# Patient Record
Sex: FEMALE | Race: WHITE | NOT HISPANIC OR LATINO | Employment: FULL TIME | URBAN - METROPOLITAN AREA
[De-identification: names, ages, dates, MRNs, and addresses within clinical notes are randomized per-mention and may not be internally consistent; named-entity substitution may affect disease eponyms.]

---

## 2017-08-08 ENCOUNTER — OFFICE VISIT - HEALTHEAST (OUTPATIENT)
Dept: FAMILY MEDICINE | Facility: CLINIC | Age: 27
End: 2017-08-08

## 2017-08-08 DIAGNOSIS — F41.1 GENERALIZED ANXIETY DISORDER: ICD-10-CM

## 2017-08-08 DIAGNOSIS — G47.00 INSOMNIA: ICD-10-CM

## 2017-08-08 DIAGNOSIS — N92.6 IRREGULAR MENSES: ICD-10-CM

## 2017-08-08 DIAGNOSIS — K64.9 HEMORRHOIDS: ICD-10-CM

## 2017-08-08 LAB — HBA1C MFR BLD: 5.2 % (ref 3.5–6)

## 2017-08-08 ASSESSMENT — MIFFLIN-ST. JEOR: SCORE: 1533.28

## 2017-08-10 ENCOUNTER — COMMUNICATION - HEALTHEAST (OUTPATIENT)
Dept: FAMILY MEDICINE | Facility: CLINIC | Age: 27
End: 2017-08-10

## 2017-08-23 ENCOUNTER — COMMUNICATION - HEALTHEAST (OUTPATIENT)
Dept: FAMILY MEDICINE | Facility: CLINIC | Age: 27
End: 2017-08-23

## 2017-09-25 ENCOUNTER — RECORDS - HEALTHEAST (OUTPATIENT)
Dept: ADMINISTRATIVE | Facility: OTHER | Age: 27
End: 2017-09-25

## 2019-01-07 ENCOUNTER — COMMUNICATION - HEALTHEAST (OUTPATIENT)
Dept: SCHEDULING | Facility: CLINIC | Age: 29
End: 2019-01-07

## 2019-01-08 ENCOUNTER — RECORDS - HEALTHEAST (OUTPATIENT)
Dept: ADMINISTRATIVE | Facility: OTHER | Age: 29
End: 2019-01-08

## 2021-05-31 VITALS — HEIGHT: 65 IN | BODY MASS INDEX: 29.66 KG/M2 | WEIGHT: 178 LBS

## 2021-06-12 NOTE — PROGRESS NOTES
Assessment/Plan:     1. Generalized anxiety disorder  We will get labs as below per patient's request.  Recommend Wellbutrin as that may help her with some of her weight loss and could be activating which is a major concern for her.  May titrate up after 2 weeks if not fully effective dose.  Consider therapy continue oils and supportive care.  - buPROPion (WELLBUTRIN XL) 150 MG 24 hr tablet; Take 1 tablet (150 mg total) by mouth every morning.  Dispense: 30 tablet; Refill: 7  - Thyroid Cascade    2. Irregular menses  We will place referral per patient's request will get basic labs as below.  Recommended low-carb diet.  - Ambulatory referral to Obstetrics / Gynecology  - Dehydroepiandrosterone Sulfate(DHEA-S)  - Testosterone, Total and Free  - Estradiol  - Progesterone  - Glycosylated Hemoglobin A1c    3. Hemorrhoids  We will try cream and refer to colorectal as patient is interested in discussing possible surgery.  - hydrocortisone (ANUSOL-HC) 2.5 % rectal cream; Apply rectally 2 times daily  Dispense: 30 g; Refill: 1  - Ambulatory referral to Colorectal Surgery    4. Insomnia  Recommend trying melatonin hopefully insomnia will improve once anxiety is under better control.  Also recommend could take magnesium.      The following are part of a depression follow up plan for the patient:  emotional support assessment, emotional support education and emotional support management  Total time spent was 45 minutes, >50% spent discussing treatment options noted above, counseling and coordination of care.     Subjective:      Marisol Licea is a 26 y.o. female comes in today with several concerns.  Her primary concern is mental health symptoms.  She has actually brought this up to a previous provider reviewed the visit note from February 2015.  At that time she was prescribed Lexapro and hydroxyzine but never ended up taking the medications.  She states that she has always been somewhat anxious and depressed but it  seems as though it is interfering with her life over the past year.  She states that she feels like she just wants to sit on the couch all day long she is not getting up and doing things she feels like she is wasted the summer.  She states that she drinks regularly to help calm the anxiety she uses the example that even if she has to go to [employer] she has to drink before going there.  This has been her habit for the last several years.  She states that she drinks too much she gets a hangover next day her anxiety is even worse.  She like to stop the habit of drinking.  She like to drink for fun only.  There is not a history of anxiety or mental health concerns in her family.  She previously states she was against taking medications but now she thinks that she is ready to take them.  She is also concerned gained weight due to her lack of interest.  Had breast augmentation a year ago and gained weight while recovering for that but staff to work on at home but is not using it.  Also difficulty sleeping just ordered some melatonin but has not tried it yet.  She has concerns about infertility.  States she is not actively trying to get pregnant but regularly has intercourse with her boyfriend and they do not use protection.  She does not want to start birth control pills.  States that she does not really have a fear of pregnancy because of this infertility concern.  She is afraid that she will be able to have a kid has low libido I think it is likely due to her anxiety.  Reviewed last labs.  She gets.  About 4 times a year mom has history of endometriosis.  She states she does not have very painful periods but does get some pain every once in a while in her ovary area and think she has a history of ovarian cysts.  She has had ultrasounds in the past but has not been evaluated by gynecologist.  Also at the end of the appointment she tells me she has some concerns with hemorrhoids.  She states she does not have any  "constipation.  She states that previously they told her that she would need surgery because they are dime sized.  She has tried over-the-counter therapies.    Current Outpatient Prescriptions   Medication Sig Dispense Refill     buPROPion (WELLBUTRIN XL) 150 MG 24 hr tablet Take 1 tablet (150 mg total) by mouth every morning. 30 tablet 7     hydrocortisone (ANUSOL-HC) 2.5 % rectal cream Apply rectally 2 times daily 30 g 1     No current facility-administered medications for this visit.      No Known Allergies  Past Medical History, Family History, and Social History reviewed.  No past medical history on file.  No past surgical history on file.  Review of patient's allergies indicates no known allergies.  No family history on file.  Social History     Social History     Marital status: Single     Spouse name: N/A     Number of children: N/A     Years of education: N/A     Occupational History     Not on file.     Social History Main Topics     Smoking status: Current Every Day Smoker     Packs/day: 0.25     Last attempt to quit: 5/31/2016     Smokeless tobacco: Never Used     Alcohol use Not on file     Drug use: Not on file     Sexual activity: Not on file     Other Topics Concern     Not on file     Social History Narrative         Review of systems is as stated in HPI, and the remainder of the 10 system review is otherwise negative.    Objective:     Vitals:    08/08/17 0951   BP: 110/70   Pulse: 78   SpO2: 98%   Weight: 178 lb (80.7 kg)   Height: 5' 5\" (1.651 m)    Body mass index is 29.62 kg/(m^2).  Wt Readings from Last 3 Encounters:   08/08/17 178 lb (80.7 kg)   06/01/16 156 lb (70.8 kg)   03/24/16 157 lb 9.6 oz (71.5 kg)       General Appearance:    Alert, cooperative, no distress, appears stated age    Head:    Normocephalic, without obvious abnormality, atraumatic   Eyes:    PERRL, no conjunctivitis    Ears:    Normal  external ear canals   Nose:   Mucosa normal, no drainage       Throat:   Oropharynx is " clear   Neck:   Supple, symmetrical, no adenopathy, no thyromegally, no carotid bruit        Lungs:     Clear to auscultation bilaterally, respirations unlabored   Chest Wall:    No tenderness or deformity    Heart:    Regular rate and rhythm, S1 and S2 normal, no murmur, rub    or gallop       Abdomen:     Soft, non-tender, patient declines rectal exam today           Extremities:   Extremities normal, atraumatic, no cyanosis or edema   Pulses:   2+ and symmetric all extremities   Neuro:   cranial nerves grossly intact   Psych:   Scribes sometimes of anxiety and depression but otherwise grossly normal mood and affect without acute anxiety or psychosis    Skin:   No rashes or lesions   :          This note has been dictated using voice recognition software. Any grammatical or context distortions are unintentional and inherent to the software.

## 2021-06-22 NOTE — TELEPHONE ENCOUNTER
"Pt calls to report several issues:  -  history of \"pre-cancerous moles\"  - \"L side of my face has been going numb\" -> \"Has been happening a lot.\"  - \"I've been stooling blood -> About twice a day for the past week.\"  - \"Stomach pains\".    \"I drink a lot of ETOH.\"  \"I drink a 750 Liter bottle of vodka every two days.\"  Has been doing this for three months.    Advised seeking ED eval at [facility], for purpose of stabilizing GI bleed, ruling out stroke, and starting a pathway for outpatient ETOH detox.  Pt verbalizes understanding, however is severely weepy, sobbing.  Asks Triage RN to also explain eval recommendations to her mother .... Done now.  Both pt and mother agree to plan.    Annmarie Hendrickson RN BSBA  [facility] RN Triage     Reason for Disposition    MODERATE rectal bleeding (small blood clots, passing blood without stool, or toilet water turns red) more than once a day    Neurologic deficit that was brief (now gone), ANY of the following: * Weakness of the face, arm, or leg on one side of the body * Numbness of the face, arm, or leg on one side of the body * Loss of speech or garbled speech    Protocols used: RECTAL BLEEDING-A-OH, NEUROLOGIC DEFICIT-A-OH      "

## 2022-01-19 ENCOUNTER — OFFICE VISIT (OUTPATIENT)
Dept: FAMILY MEDICINE | Facility: CLINIC | Age: 32
End: 2022-01-19
Payer: COMMERCIAL

## 2022-01-19 VITALS
WEIGHT: 157.4 LBS | OXYGEN SATURATION: 98 % | HEART RATE: 69 BPM | SYSTOLIC BLOOD PRESSURE: 102 MMHG | DIASTOLIC BLOOD PRESSURE: 70 MMHG | BODY MASS INDEX: 27.02 KG/M2

## 2022-01-19 DIAGNOSIS — K64.4 EXTERNAL HEMORRHOIDS: Primary | ICD-10-CM

## 2022-01-19 PROCEDURE — 99203 OFFICE O/P NEW LOW 30 MIN: CPT | Performed by: STUDENT IN AN ORGANIZED HEALTH CARE EDUCATION/TRAINING PROGRAM

## 2022-01-19 NOTE — PROGRESS NOTES
Assessment and Plan     31-year-old female who presents with concerns of hemorrhoids over the last 5 years or so.  Intermittently painful and bothersome.  She finds the associated skin tags unsightly.  She is wondering about options in treating this.  I recommended she start MiraLAX daily and considering decreasing her weightlifting routine.  I recommended referral to a colorectal surgeon to consider skin tag removal and whether she needs further procedural treatments.    1. External hemorrhoids  - Colorectal Surgery Referral; Future    Follow up: PRN  Options for treatment and follow-up care were reviewed with the patient and/or guardian. Marisol Licea and/or guardian engaged in the decision making process and verbalized understanding of the options discussed and agreed with the final plan.    Dr. Adrian Brooke         HPI:   Marisol Licea is a 31 year old  female who presents for:    Chief Complaint   Patient presents with     Rectal Problem     symptoms for about 5 years. consistent      Patient has hemorrhoids for a couple years. No history of constipation or hx of childbirth. she does however lift frequently approximately 6 days a week.  She has noticed more pain lately.  She states she very rarely has spotting on the tissue paper and in the toilet.  She can feel hemorrhoids almost all the time there and even when they are not particularly painful she feels like the skin is loose near her anus.  She is quite embarrassed by them and is wondering about treatment options.  She has been seen by the physician before for these but never really given advice on treatment options such as procedural and such.         PMHX:     Patient Active Problem List   Diagnosis     ACL tear       No current outpatient medications on file.       Social History     Tobacco Use     Smoking status: Current Every Day Smoker     Packs/day: 0.25     Last attempt to quit: 2016     Years since quittin.6      Smokeless tobacco: Never Used   Substance Use Topics     Alcohol use: Not on file     Drug use: Not on file       Social History     Social History Narrative     Not on file       No Known Allergies    No results found for this or any previous visit (from the past 24 hour(s)).         Review of Systems:    ROS: 10 point ROS neg other than the symptoms noted above in the HPI.         Physical Exam:     Vitals:    01/19/22 1437   BP: 102/70   BP Location: Right arm   Patient Position: Sitting   Cuff Size: Adult Regular   Pulse: 69   SpO2: 98%   Weight: 71.4 kg (157 lb 6.4 oz)     Body mass index is 27.02 kg/m .    General appearance: Alert, cooperative, no distress, appears stated age  Head: Normocephalic, atraumatic, without obvious abnormality  Eyes: Pupils equal round, reactive.  Conjunctiva clear.  Nose: Nares normal, no drainage.  Throat: Lips, mucosa, tongue normal mucosa pink and moist  Neck: Supple, symmetric, trachea midline  Rectal: Skin tags noted but no enlarged hemorrhoids seen externally, anoscopy reveals some internal hemorrhoids but no fissure or fistula.  No rectal mass.

## 2022-01-25 ENCOUNTER — TELEPHONE (OUTPATIENT)
Dept: FAMILY MEDICINE | Facility: CLINIC | Age: 32
End: 2022-01-25
Payer: COMMERCIAL

## 2022-01-25 NOTE — TELEPHONE ENCOUNTER
Reason for Call:  Other call back    Detailed comments: Recvd call from pt/Marisol, she is wondering how often she should be taking the MiraLAX that Dr Perry has recommended. The bottle states do not exceed 7 days in a row. Marisol is wondering if she should take it everyday but to not exceed 7 days or every other day.     Phone Number Patient can be reached at: Home number on file [phone] (home)    Best Time: anytime    Can we leave a detailed message on this number? YES    Call taken on 1/25/2022 at 11:56 AM by Shyla Fleming

## 2022-01-26 ENCOUNTER — TRANSFERRED RECORDS (OUTPATIENT)
Dept: HEALTH INFORMATION MANAGEMENT | Facility: CLINIC | Age: 32
End: 2022-01-26
Payer: COMMERCIAL

## 2022-02-01 ENCOUNTER — TRANSFERRED RECORDS (OUTPATIENT)
Dept: HEALTH INFORMATION MANAGEMENT | Facility: CLINIC | Age: 32
End: 2022-02-01
Payer: COMMERCIAL

## 2022-02-06 ENCOUNTER — HEALTH MAINTENANCE LETTER (OUTPATIENT)
Age: 32
End: 2022-02-06

## 2022-02-23 ENCOUNTER — OFFICE VISIT (OUTPATIENT)
Dept: FAMILY MEDICINE | Facility: CLINIC | Age: 32
End: 2022-02-23
Payer: COMMERCIAL

## 2022-02-23 VITALS
BODY MASS INDEX: 25.61 KG/M2 | OXYGEN SATURATION: 100 % | SYSTOLIC BLOOD PRESSURE: 110 MMHG | TEMPERATURE: 97.8 F | HEART RATE: 94 BPM | WEIGHT: 153.7 LBS | DIASTOLIC BLOOD PRESSURE: 76 MMHG | HEIGHT: 65 IN

## 2022-02-23 DIAGNOSIS — K64.4 EXTERNAL HEMORRHOIDS: ICD-10-CM

## 2022-02-23 DIAGNOSIS — K64.8 INTERNAL HEMORRHOID: ICD-10-CM

## 2022-02-23 DIAGNOSIS — Z01.818 PREOP GENERAL PHYSICAL EXAM: Primary | ICD-10-CM

## 2022-02-23 LAB
HCG UR QL: NEGATIVE
HGB BLD-MCNC: 14.1 G/DL (ref 11.7–15.7)

## 2022-02-23 PROCEDURE — 81025 URINE PREGNANCY TEST: CPT | Performed by: FAMILY MEDICINE

## 2022-02-23 PROCEDURE — 85018 HEMOGLOBIN: CPT | Performed by: FAMILY MEDICINE

## 2022-02-23 PROCEDURE — 36415 COLL VENOUS BLD VENIPUNCTURE: CPT | Performed by: FAMILY MEDICINE

## 2022-02-23 PROCEDURE — 99214 OFFICE O/P EST MOD 30 MIN: CPT | Performed by: FAMILY MEDICINE

## 2022-02-23 NOTE — PROGRESS NOTES
[facility]  [address] [address]  Phone: [phone]  Fax: [phone]  Primary Provider: Sabiha Ramos  Pre-op Performing Provider: CALLUM SIDDIQUI      PREOPERATIVE EVALUATION:  Today's date: 2/23/2022    Marisol Licea is a 31 year old female who presents for a preoperative evaluation.    Surgical Information:  Surgery/Procedure: Hemorrhoidectomy   Surgery Location: [facility]  Surgeon: Dr. Reed  Surgery Date: 2/25/2022  Time of Surgery: 3:45pm  Where patient plans to recover: At home with family  Fax number for surgical facility: [phone]    Type of Anesthesia Anticipated: to be determined    Assessment & Plan     The proposed surgical procedure is considered INTERMEDIATE risk.    Preop general physical exam, External & internal hemorrhoids  - Hemoglobin  - HCG qualitative urine    Risks and Recommendations:  The patient has the following additional risks and recommendations for perioperative complications:   - No identified additional risk factors other than previously addressed    Medication Instructions:  Patient is on no chronic medications    RECOMMENDATION:  APPROVAL GIVEN to proceed with proposed procedure, without further diagnostic evaluation.    Subjective     HPI related to upcoming procedure:     Preop Questions 2/23/2022   1. Have you ever had a heart attack or stroke? No   2. Have you ever had surgery on your heart or blood vessels, such as a stent placement, a coronary artery bypass, or surgery on an artery in your head, neck, heart, or legs? No   3. Do you have chest pain with activity? No   4. Do you have a history of  heart failure? No   5. Do you currently have a cold, bronchitis or symptoms of other infection? No   6. Do you have a cough, shortness of breath, or wheezing? No   7. Do you or anyone in your family have previous history of blood clots? No   8. Do you or does anyone in your family have a serious  bleeding problem such as prolonged bleeding following surgeries or cuts? No   9. Have you ever had problems with anemia or been told to take iron pills? No   10. Have you had any abnormal blood loss such as black, tarry or bloody stools, or abnormal vaginal bleeding? No   11. Have you ever had a blood transfusion? No   12. Are you willing to have a blood transfusion if it is medically needed before, during, or after your surgery? Yes   13. Have you or any of your relatives ever had problems with anesthesia? No   14. Do you have sleep apnea, excessive snoring or daytime drowsiness? No   15. Do you have any artifical heart valves or other implanted medical devices like a pacemaker, defibrillator, or continuous glucose monitor? No   16. Do you have artificial joints? No   17. Are you allergic to latex? No   18. Is there any chance that you may be pregnant? No     Preoperative Review of :   reviewed - no record of controlled substances prescribed.      Status of Chronic Conditions:  See problem list for active medical problems.  Problems all longstanding and stable, except as noted/documented.  See ROS for pertinent symptoms related to these conditions.      Review of Systems  CONSTITUTIONAL: NEGATIVE for fever, chills, change in weight  INTEGUMENTARY/SKIN: NEGATIVE for worrisome rashes, moles or lesions  EYES: NEGATIVE for vision changes or irritation  ENT/MOUTH: NEGATIVE for ear, mouth and throat problems  RESP: NEGATIVE for significant cough or SOB  CV: NEGATIVE for chest pain, palpitations or peripheral edema  GI: NEGATIVE for nausea, abdominal pain, heartburn, or change in bowel habits  : NEGATIVE for frequency, dysuria, or hematuria  MUSCULOSKELETAL: NEGATIVE for significant arthralgias or myalgia  NEURO: NEGATIVE for weakness, dizziness or paresthesias  ENDOCRINE: NEGATIVE for temperature intolerance, skin/hair changes  HEME: NEGATIVE for bleeding problems  PSYCHIATRIC: NEGATIVE for changes in mood or  "affect    Patient Active Problem List    Diagnosis Date Noted     ACL tear 2009     Priority: Medium     Formatting of this note might be different from the original.  ACL Tear, Left        No past medical history on file.  No past surgical history on file.  No current outpatient medications on file.       No Known Allergies     Social History     Tobacco Use     Smoking status: Current Every Day Smoker     Packs/day: 0.25     Last attempt to quit: 2016     Years since quittin.7     Smokeless tobacco: Never Used   Substance Use Topics     Alcohol use: Not on file     No family history on file.  History   Drug Use Not on file         Objective     /76   Pulse 94   Temp 97.8  F (36.6  C) (Oral)   Ht 1.638 m (5' 4.5\")   Wt 69.7 kg (153 lb 11.2 oz)   LMP 2022 (Exact Date)   SpO2 100%   BMI 25.98 kg/m      Physical Exam    GENERAL APPEARANCE: healthy, alert and no distress     EYES: EOMI, PERRL     HENT: ear canals and TM's normal      NECK: no adenopathy, no asymmetry, masses, or scars and thyroid normal to palpation     RESP: lungs clear to auscultation - no rales, rhonchi or wheezes     CV: regular rates and rhythm, normal S1 S2, no S3 or S4 and no murmur, click or rub     ABDOMEN:  soft, nontender, no HSM or masses and bowel sounds normal     MS: extremities normal- no gross deformities noted, no evidence of inflammation in joints, FROM in all extremities.     SKIN: no suspicious lesions or rashes     NEURO: Normal strength and tone, sensory exam grossly normal, mentation intact and speech normal     PSYCH: mentation appears normal. and affect normal/bright     LYMPHATICS: No cervical adenopathy    No results for input(s): HGB, PLT, INR, NA, POTASSIUM, CR, A1C in the last 61087 hours.     Diagnostics:  Recent Results (from the past 24 hour(s))   Hemoglobin    Collection Time: 22 11:17 AM   Result Value Ref Range    Hemoglobin 14.1 11.7 - 15.7 g/dL   HCG qualitative urine    " Collection Time: 02/23/22 11:22 AM   Result Value Ref Range    hCG Urine Qualitative Negative Negative      No EKG required, no history of coronary heart disease, significant arrhythmia, peripheral arterial disease or other structural heart disease.    Revised Cardiac Risk Index (RCRI):  The patient has the following serious cardiovascular risks for perioperative complications:   - No serious cardiac risks = 0 points     RCRI Interpretation: 0 points: Class I (very low risk - 0.4% complication rate)           Signed Electronically by: Markos Do MD  Copy of this evaluation report is provided to requesting physician.

## 2022-02-25 PROCEDURE — 88304 TISSUE EXAM BY PATHOLOGIST: CPT | Mod: TC,ORL | Performed by: COLON & RECTAL SURGERY

## 2022-02-28 ENCOUNTER — LAB REQUISITION (OUTPATIENT)
Dept: LAB | Facility: CLINIC | Age: 32
End: 2022-02-28
Payer: COMMERCIAL

## 2022-03-01 LAB
PATH REPORT.COMMENTS IMP SPEC: NORMAL
PATH REPORT.COMMENTS IMP SPEC: NORMAL
PATH REPORT.FINAL DX SPEC: NORMAL
PATH REPORT.GROSS SPEC: NORMAL
PATH REPORT.MICROSCOPIC SPEC OTHER STN: NORMAL
PATH REPORT.RELEVANT HX SPEC: NORMAL
PHOTO IMAGE: NORMAL

## 2022-03-01 PROCEDURE — 88304 TISSUE EXAM BY PATHOLOGIST: CPT | Mod: 26 | Performed by: PATHOLOGY

## 2022-03-15 ENCOUNTER — TRANSFERRED RECORDS (OUTPATIENT)
Dept: HEALTH INFORMATION MANAGEMENT | Facility: CLINIC | Age: 32
End: 2022-03-15
Payer: COMMERCIAL

## 2022-04-05 ENCOUNTER — TRANSFERRED RECORDS (OUTPATIENT)
Dept: HEALTH INFORMATION MANAGEMENT | Facility: CLINIC | Age: 32
End: 2022-04-05
Payer: COMMERCIAL

## 2022-05-03 ENCOUNTER — TRANSFERRED RECORDS (OUTPATIENT)
Dept: HEALTH INFORMATION MANAGEMENT | Facility: CLINIC | Age: 32
End: 2022-05-03
Payer: COMMERCIAL

## 2022-05-24 ENCOUNTER — OFFICE VISIT (OUTPATIENT)
Dept: FAMILY MEDICINE | Facility: CLINIC | Age: 32
End: 2022-05-24
Payer: COMMERCIAL

## 2022-05-24 VITALS
WEIGHT: 161.1 LBS | BODY MASS INDEX: 27.23 KG/M2 | TEMPERATURE: 98.5 F | OXYGEN SATURATION: 100 % | SYSTOLIC BLOOD PRESSURE: 120 MMHG | DIASTOLIC BLOOD PRESSURE: 80 MMHG | RESPIRATION RATE: 20 BRPM | HEART RATE: 80 BPM

## 2022-05-24 DIAGNOSIS — Z32.01 PREGNANCY TEST POSITIVE: Primary | ICD-10-CM

## 2022-05-24 LAB — HCG UR QL: POSITIVE

## 2022-05-24 PROCEDURE — 99213 OFFICE O/P EST LOW 20 MIN: CPT | Performed by: NURSE PRACTITIONER

## 2022-05-24 PROCEDURE — 81025 URINE PREGNANCY TEST: CPT | Performed by: NURSE PRACTITIONER

## 2022-05-24 ASSESSMENT — PAIN SCALES - GENERAL: PAINLEVEL: MILD PAIN (2)

## 2022-05-24 NOTE — PROGRESS NOTES
"  Assessment & Plan     Pregnancy test positive  Pregnancy confirmation visit completed today. Patient is approx. 5.5 weeks gestation with an estimated due date of January 20th. We discussed pregnancy recommendations and safety concerns. Reviewed medications and foods that are considered safe in pregnancy. Advised against any substance use. She has started taking a prenatal vitamin. Referral for ultrasound placed for 7-9 weeks gestation. She will schedule first prenatal appointment shortly after that.   - HCG qualitative urine POCT, Enter/Edit Result  - HCG qualitative urine  - US OB < 14 Weeks Single      BMI:   Estimated body mass index is 27.23 kg/m  as calculated from the following:    Height as of 2/23/22: 1.638 m (5' 4.5\").    Weight as of this encounter: 73.1 kg (161 lb 1.6 oz).       No follow-ups on file.    MILKA Petersen CNP  [facility] MACRINA Damon is a 31 year old who presents for the following health issues: pregnancy confirmation    HPI   Patient is here today for pregnancy confirmation visit.  She has not seen her primary care doctor in several years.  She is overall very healthy.  Reports history of PCOS and has always had irregular menstrual periods until recently.  She has been having menstrual period more regularly over the last year or so.  She took a positive home pregnancy test this past Friday.  Her last menstrual period was around 4/15 which puts her at 5-1/2 weeks gestation today.  This was unexpected as she has only been in a relationship for 2 months however she feels excited about the pregnancy and supported by her partner.  She denies any chance for sexually transmitted infection.  She has been sober from alcohol for 3 years.  She denies any use of illicit drugs or prescription medications.  She does work out heavily and drinks high-protein drinks along with excessive amounts of caffeine.  She stopped caffeine intake altogether when she found " out she was pregnant.  She has been taking a prenatal vitamin regularly.    She is not currently on any health insurance but this will be started June 1.  She prefers to hold off on any blood work or tests until after that time if possible.      Review of Systems   Review of Systems - pertinent positives noted in HPI, otherwise ROS is negative.        Objective    /80 (BP Location: Right arm, Patient Position: Sitting, Cuff Size: Adult Regular)   Pulse 80   Temp 98.5  F (36.9  C)   Resp 20   Wt 73.1 kg (161 lb 1.6 oz)   LMP 04/15/2022   SpO2 100%   BMI 27.23 kg/m    Body mass index is 27.23 kg/m .  Physical Exam     General Appearance:  Alert, cooperative, no distress, appears stated age   Heart:  Regular rate and rhythm, S1, S2 normal, no murmur, rub or gallop   Extremities: Extremities normal.    Skin: Warm, dry.  Skin color, texture, turgor normal, no rashes or lesions                 Answers for HPI/ROS submitted by the patient on 5/24/2022  What is the reason for your visit today? : Pregnancy  How many servings of fruits and vegetables do you eat daily?: 0-1  On average, how many sweetened beverages do you drink each day (Examples: soda, juice, sweet tea, etc.  Do NOT count diet or artificially sweetened beverages)?: 0  How many minutes a day do you exercise enough to make your heart beat faster?: 60 or more  How many days a week do you exercise enough to make your heart beat faster?: 5  How many days per week do you miss taking your medication?: 0

## 2022-06-14 ENCOUNTER — TRANSFERRED RECORDS (OUTPATIENT)
Dept: HEALTH INFORMATION MANAGEMENT | Facility: CLINIC | Age: 32
End: 2022-06-14
Payer: COMMERCIAL

## 2022-06-16 ENCOUNTER — HOSPITAL ENCOUNTER (OUTPATIENT)
Dept: ULTRASOUND IMAGING | Facility: HOSPITAL | Age: 32
Discharge: HOME | End: 2022-06-16
Attending: NURSE PRACTITIONER | Admitting: NURSE PRACTITIONER
Payer: COMMERCIAL

## 2022-06-16 ENCOUNTER — PRENATAL OFFICE VISIT (OUTPATIENT)
Dept: FAMILY MEDICINE | Facility: CLINIC | Age: 32
End: 2022-06-16
Payer: COMMERCIAL

## 2022-06-16 VITALS — SYSTOLIC BLOOD PRESSURE: 122 MMHG | WEIGHT: 169 LBS | DIASTOLIC BLOOD PRESSURE: 86 MMHG | BODY MASS INDEX: 28.56 KG/M2

## 2022-06-16 DIAGNOSIS — Z32.01 PREGNANCY TEST POSITIVE: ICD-10-CM

## 2022-06-16 DIAGNOSIS — Z34.00 SUPERVISION OF NORMAL FIRST PREGNANCY, ANTEPARTUM: Primary | ICD-10-CM

## 2022-06-16 PROBLEM — E28.2 PCOS (POLYCYSTIC OVARIAN SYNDROME): Status: ACTIVE | Noted: 2022-06-16

## 2022-06-16 PROBLEM — K64.9 HEMORRHOIDS, UNSPECIFIED HEMORRHOID TYPE: Status: ACTIVE | Noted: 2022-06-16

## 2022-06-16 LAB
ABO/RH(D): NORMAL
ALBUMIN UR-MCNC: NEGATIVE MG/DL
ANTIBODY SCREEN: NEGATIVE
APPEARANCE UR: CLEAR
BILIRUB UR QL STRIP: NEGATIVE
COLOR UR AUTO: YELLOW
ERYTHROCYTE [DISTWIDTH] IN BLOOD BY AUTOMATED COUNT: 11.8 % (ref 10–15)
GLUCOSE UR STRIP-MCNC: NEGATIVE MG/DL
HCT VFR BLD AUTO: 36 % (ref 35–47)
HGB BLD-MCNC: 12.6 G/DL (ref 11.7–15.7)
HGB UR QL STRIP: NEGATIVE
KETONES UR STRIP-MCNC: NEGATIVE MG/DL
LEUKOCYTE ESTERASE UR QL STRIP: NEGATIVE
MCH RBC QN AUTO: 31.7 PG (ref 26.5–33)
MCHC RBC AUTO-ENTMCNC: 35 G/DL (ref 31.5–36.5)
MCV RBC AUTO: 91 FL (ref 78–100)
NITRATE UR QL: NEGATIVE
PH UR STRIP: 6.5 [PH] (ref 5–8)
PLATELET # BLD AUTO: 299 10E3/UL (ref 150–450)
RBC # BLD AUTO: 3.98 10E6/UL (ref 3.8–5.2)
SP GR UR STRIP: 1.02 (ref 1–1.03)
SPECIMEN EXPIRATION DATE: NORMAL
UROBILINOGEN UR STRIP-ACNC: 0.2 E.U./DL
WBC # BLD AUTO: 9.5 10E3/UL (ref 4–11)

## 2022-06-16 PROCEDURE — 36415 COLL VENOUS BLD VENIPUNCTURE: CPT | Performed by: FAMILY MEDICINE

## 2022-06-16 PROCEDURE — 87491 CHLMYD TRACH DNA AMP PROBE: CPT | Performed by: FAMILY MEDICINE

## 2022-06-16 PROCEDURE — 86780 TREPONEMA PALLIDUM: CPT | Performed by: FAMILY MEDICINE

## 2022-06-16 PROCEDURE — 76801 OB US < 14 WKS SINGLE FETUS: CPT

## 2022-06-16 PROCEDURE — 99207 PR FIRST OB VISIT: CPT | Performed by: FAMILY MEDICINE

## 2022-06-16 PROCEDURE — 86762 RUBELLA ANTIBODY: CPT | Performed by: FAMILY MEDICINE

## 2022-06-16 PROCEDURE — 87340 HEPATITIS B SURFACE AG IA: CPT | Performed by: FAMILY MEDICINE

## 2022-06-16 PROCEDURE — 86900 BLOOD TYPING SEROLOGIC ABO: CPT | Performed by: FAMILY MEDICINE

## 2022-06-16 PROCEDURE — 86850 RBC ANTIBODY SCREEN: CPT | Performed by: FAMILY MEDICINE

## 2022-06-16 PROCEDURE — 86706 HEP B SURFACE ANTIBODY: CPT | Performed by: FAMILY MEDICINE

## 2022-06-16 PROCEDURE — 86803 HEPATITIS C AB TEST: CPT | Performed by: FAMILY MEDICINE

## 2022-06-16 PROCEDURE — 87389 HIV-1 AG W/HIV-1&-2 AB AG IA: CPT | Performed by: FAMILY MEDICINE

## 2022-06-16 PROCEDURE — 81003 URINALYSIS AUTO W/O SCOPE: CPT | Performed by: FAMILY MEDICINE

## 2022-06-16 PROCEDURE — 87086 URINE CULTURE/COLONY COUNT: CPT | Performed by: FAMILY MEDICINE

## 2022-06-16 PROCEDURE — 87591 N.GONORRHOEAE DNA AMP PROB: CPT | Performed by: FAMILY MEDICINE

## 2022-06-16 PROCEDURE — 99213 OFFICE O/P EST LOW 20 MIN: CPT | Performed by: FAMILY MEDICINE

## 2022-06-16 PROCEDURE — 85027 COMPLETE CBC AUTOMATED: CPT | Performed by: FAMILY MEDICINE

## 2022-06-16 PROCEDURE — 86901 BLOOD TYPING SEROLOGIC RH(D): CPT | Performed by: FAMILY MEDICINE

## 2022-06-16 NOTE — PROGRESS NOTES
Clinic Note - Initial OB Visit    Marisol Licea is a 31 year old  female who presents to clinic for a new OB visit.      Her last menstrual period was 4/15/22 - ROSCOE 23 - 8+6 wks today    She has not had bleeding since her LMP. She has had any abdominal pain or cramping since her LMP - like period cramp but now more irregular, mild. She has had mild nausea. Weigh loss has not occurred. This was not a planned pregnancy but welcomed. Having issues after hemorrhoidectomy in Feb - working with colorectal.    Pre Term Labor Risk Assessment  Is the patient's age <18 or >40? No  Patint's BMI is Body mass index is 28.56 kg/m .. Does patient have a BMI < 18.5? No  If previous pregnancy, was delivery within previous 6 months? No  Have you ever delivered a baby prior to 37 weeks gestation? No  Did conception for this pregnancy occur via In Vitro Fertilization? No  Summary: Patient is not high risk for  Labor for  labor.    Patient Active Problem List   Diagnosis     ACL tear     PCOS (polycystic ovarian syndrome)     Hemorrhoids, unspecified hemorrhoid type       OB History    Para Term  AB Living   1 0 0 0 0 0   SAB IAB Ectopic Multiple Live Births   0 0 0 0 0      # Outcome Date GA Lbr Joselito/2nd Weight Sex Delivery Anes PTL Lv   1 Current                History reviewed. No pertinent past medical history.    Past Surgical History:   Procedure Laterality Date     HEMORRHOIDECTOMY         Current Outpatient Medications   Medication Sig Dispense Refill     Prenatal Vit-Fe Fumarate-FA (PRENATAL PO)          Do you have a history of any of the following medical conditions?  Condition Yes/No   Hypertension No   Heart disease, mitral valve prolapse, rheumatic fever No   Asthma or another chronic lung disease No   An autoimmune disorder No   Kidney disease No   Frequent urinary tract infections No   Migraine headaches No   Stroke, loss of sensation/function, seizures, or other neuro problem  No   Diabetes No   Thyroid problems or have you taken thyroid medication No   Hepatitis, liver disease, jaundice No   Blood clots, phlebitis, pulmonary embolism or varicose veins No   Excessive bleeding after surgery or dental work No   Heavy menstrual periods requiring treatment No   Anemia No   Blood transfusions No        Would you refuse a blood transfusion? No     Prenatal Genetic Screening  Do you have a history of any of the following Yes/No        A metabolic disorder (e.g. Insulin-dependent DM, PKU) No        Recurrent pregnancy loss No     Do you, the baby's father, or anyone in your families have Yes/No        Thalassemia AND MCV <80 No        Hemophilia No        Neural tube defect No        Congenital heart defect No        Sickle cell disease or trait No        Muscular dystrophy No        Cystic fibrosis No        Mental retardation or autism No        Down's syndrome No        Perico-Sach's disease No        Maritza's chorea No        Any other inherited genetic or chromosomal disorder No        A child with birth defects not listed above No   Uncle with down syndrome and aunt on dad's side had kids with down syndrome    Infection History  At high risk for coming in contact with HIV No   Ever treated for tuberculosis No   Ever received the BCG vaccine for tuberculosis No   Ever had genital herpes (or has your partner) No   Had a rash or viral illness since LMP No   Ever had a sexually transmitted infection No   Ever had chicken pox or the vaccine Yes   Ever had any other serious infectious disease No   Up to date with immunizations Yes      PERSONAL/SOCIAL HISTORY  Social History     Socioeconomic History     Marital status: Single   Tobacco Use     Smoking status: Former Smoker     Packs/day: 0.25     Quit date: 2016     Years since quittin.0     Smokeless tobacco: Never Used     Have you used any tobacco products, alcohol or any other drugs during this pregnancy before or after your knew  you were pregnant? none    REVIEW OF SYSTEMS  C: NEGATIVE for fever, chills, change in weight  E: NEGATIVE for vision changes or irritation  ENT: NEGATIVE for ear, mouth and throat problems  R: NEGATIVE for significant cough or SOB  B: NEGATIVE for masses, tenderness or discharge  CV: NEGATIVE for chest pain, palpitations or peripheral edema  GI: NEGATIVE for abdominal pain, heartburn, or change in bowel habits  : NEGATIVE for unusual urinary or vaginal symptoms.   M: NEGATIVE for significant arthralgias or myalgia  N: NEGATIVE for weakness, dizziness or paresthesias  P: NEGATIVE for changes in mood or affect    PHYSICAL EXAM:  /86   Wt 76.7 kg (169 lb)   LMP 04/15/2022   BMI 28.56 kg/m     GENERAL:  Pleasant pregnant female, alert, well groomed. Here with FOB.  SKIN:  Warm and dry, without lesions or rashes  LUNGS:  Breathing comfortably on room air   ABDOMEN: Fundus palpable at symphysis pubis. Intrauterine fetus with cardiac activity noted on handheld US  MUSCULOSKELETAL:  Full range of motion.  EXTREMITIES:  No edema. No significant varicosities.       ASSESSMENT/PLAN  Supervision of normal first pregnancy, antepartum  G1 at 8+6 wks today by LMP - labs as below. Dating US later today. Uncomplicated pregnancy. Will likely do invitae at next visit.   - ABO/Rh type and screen  - Hepatitis B surface antigen  - CBC with platelets  - HIV Antigen Antibody Combo  - Rubella Antibody IgG  - Treponema Abs w Reflex to RPR and Titer  - Urine Culture Aerobic Bacterial  - Neisseria gonorrhoeae PCR  - Chlamydia trachomatis PCR  - Hepatitis B Surface Antibody  - Hepatitis C antibody  - UA Macro with Reflex to Micro and Culture - lab collect       - Routine prenatal labs today. CBC, type and screen, rubella, HIV, gonorrhea/chlamydia, RPR, hepatitis B, urinalysis with culture.   - Pap smear will complete at future date  - Early ultrasound for dating ordered but not yet scheduled - provided number to call and set up.      Referral(s): none      Discussed:  -Recommended weight gain of 25-35 lbs. for BMI of Body mass index is 28.56 kg/m ..  -Genetic screening options, including false positive rate of 5% with screening tests and diagnostic options (chorionic villus sampling, amniocentesis), and patient will likely do at next visit (after 10 wks gestation).  -Safe medications during pregnancy. Is currently taking prenatal vitamin daily.   -Healthy habits including not using tobacco or alcohol, exercising regularly and maintaining healthy diet  -Information given on tips for dealing with nausea, healthy habits, exposures, safety, prenatal appointment schedule, and when to call the doctor.  -Recommendations for breastfeeding.    Will follow up in 4 weeks for routine pre-dayne care.    Dee Davidson MD  [facility]

## 2022-06-16 NOTE — PATIENT INSTRUCTIONS
Ultrasound [phone] -  dating ultrasound, do at either [facility] or [facility]    In regard to the hemorrhoid issue: consider calling your colorectal office to ask what nonsurgery/nonprocedural recommendations they have to manage your symptoms during pregnancy (examples: bowel regimen, sitz baths, etc)    Tips to Relieve Nausea  Although nausea can occur at any time of the day, it may be worse in the morning. To help prevent nausea:  Eat small, light meals at frequent intervals.  Get up slowly. Eat a few unsalted crackers before you get out of bed.  Drink water or 7-Up to soothe your stomach.  Eat an ice pop in your favorite flavor.  Ask your health care provider about taking bobby or vitamin B6 for nausea and vomiting.  Talk with your health care provider if you take vitamins that upset your stomach.  Safe Medications  Take one prenatal vitamin with at least 400 mcg of folic acid daily.  Use acetaminophen (Tylenol) for pain.   Try Maalox or Tums for heartburn. If these are not working, talk to your doctor about trying a different medication.  Diphenhydramine (Benadryl) can also be used safely in pregnancy.  Talk to your doctor about any other medications or vitamins you are taking!  Start Healthy Habits Now  What matters most is protecting your baby from this moment on. If you smoke, drink alcohol, or use drugs, now is the time to stop. If you need help, talk with your health care provider.  Smoking increases the risk of miscarriage or having a low-birth-weight baby. If you smoke, quit now.  Alcohol and drugs have been linked with miscarriage, birth defects, intellectual disability, and low birth weight. Do not drink alcohol or take drugs.  Continue your current exercise routine, or start one with walking, swimming, and other low impact exercises. Yoga specifically designed for pregnant moms is a great stress and pain reliever. Keep your self well hydrated and avoid overheating with all activity. If bleeding,  fluid leakage, or cramping/contractions occur, stop the exercise and call your doctor.   Wear your seatbelt every time you are in the car. Fasten the lap part underneath your growing belly and the shoulder part as you normally would.   Weight Gain  Add an additional 300 to 400 calories a day into your diet.  Ideal weight gain is 25 to 35 pounds.  If your BMI is over 30, your ideal weight gain is 15 pounds.  If your BMI is under 20, your ideal weight gain is 40 pounds.  Talk to your doctor if you are concerned about weight gain.   Keep Your Environment Save  You can still clean house and use scented products. Just take some simple precautions:  Wear gloves when using cleaning fluids.  Open windows to let in fresh air. Use a fan if you paint.  Avoid secondhand smoke.  Don t breathe fumes from nail polish, hair spray, cleansers, or other chemicals.  Work Concerns  The end of the first trimester is a good time to discuss working during pregnancy with your employer. Follow your health care provider s advice if your job requires you to stand for a long time, work with hazardous tools, or even sit at a desk all day. Your workspace, workload, or scheduled hours may need to be adjusted - perhaps you can change body postures more often or take an extra break.  Intimacy  Unless your health care provider tells you to, there is no reason to stop having sex while you re pregnant. You or your partner may notice changes in desire. Desire may be less in the first trimester, due to nausea and fatigue. In the second trimester, sex may be very enjoyable. The third trimester can be a challenge comfort-wise. Try different positions and see what s best for you both. As always, let your doctor know if you experience any bleeding, leakage of fluids, or cramping/contractions.  Other Pregnancy Concerns  Limit the amount of radiation you are exposed to. Always tell the radiology technologist that you are pregnant if having an xray or CT scan  done.   Practice good hand washing to prevent infection.  Avoid cat litter.   Wash all fruits and vegetabes. Always cook meat thoroughly and do not eat raw fish. Do not eat more than 6 to 12 ounces of other fish sources.   Do not eat soft cheeses.  Limit caffeine to less than 200 milligrams a days. The average cup of coffee as approximately 120 milligrams of caffeine.     Nonprescription Medications Thought To Be Safe In Pregnancy (take medication according to package directions)    NAUSEA AND MOTION SICKNESS   Vitamin C 500 mg, once a day with food   Vitamin B6 50 mg, one three times a day   Unisom tablets (not gel tabs) - 1/2 to 1 tab at bedtime; may also take 1/2 tab in the morning and mid-afternoon   Dramamine   Sea Bands   Zaria tablets    CONGESTION AND COLDS   Chlortrimeton   Benadryl   Vicks Vapor Rub   Cough Drops  Avoid Robitussin and Mucinex in 1st trimester but otherwise safe during rest of pregnancy  Avoid pseudoephedrine     ALLERGIES   Alavert   Claritin   Tavist   Benadryl   Zyrtec    HEADACHES   Tylenol 325 mg 2-3 four times a day   Tylenol 500 mg 1-2 four times a day   DO NOT EXCEED 4,000 MG A DAY  DO NOT TAKE IBUPROFEN, MOTRIN, ADVIL, ASPIRIN, OR ALEVE     VAGINAL YEAST INFECTION   Monistat 3 or 7   Gyne-Lotrimin    HEMORRHOIDS   Preparation-H   Anusol   Anusol HC    HEARTBURN   Tums   Maalox (tablets or liquid)   Rolaids   Mylanta   Gaviscon   Pepcid AC  NO PEPTOBISMOL OR ALKASELTZER (contains aspirin)     DIARRHEA (do not treat for the first 24-48 hrs)   Kaopectate   Imodium AD    CONSTIPATION   Colace (Docusate Sodium) - stool softener   Aye-Colace (Colace + mild stimulant)   Any fiber supplement (Metamucil, Fibercon ,etc.)    GAS   Gas-X   Mylanta II with Simethicone   Mylicon    INSECT BITES   Lotions: Calamine, Caladryl, Benadryl   Oral: Benadryl tabs 25-50mg (every 6-8hrs)

## 2022-06-17 ENCOUNTER — MYC MEDICAL ADVICE (OUTPATIENT)
Dept: FAMILY MEDICINE | Facility: CLINIC | Age: 32
End: 2022-06-17
Payer: COMMERCIAL

## 2022-06-17 LAB
C TRACH DNA SPEC QL NAA+PROBE: NEGATIVE
HBV SURFACE AB SERPL IA-ACNC: >1000 M[IU]/ML
HBV SURFACE AG SERPL QL IA: NONREACTIVE
HCV AB SERPL QL IA: NONREACTIVE
HIV 1+2 AB+HIV1 P24 AG SERPL QL IA: NONREACTIVE
N GONORRHOEA DNA SPEC QL NAA+PROBE: NEGATIVE
RUBV IGG SERPL QL IA: 3.35 INDEX
RUBV IGG SERPL QL IA: POSITIVE
T PALLIDUM AB SER QL: NONREACTIVE

## 2022-06-17 NOTE — TELEPHONE ENCOUNTER
Information sent to patient. Advised to send another back for any questions.  Sabiha Farooq RN on 6/17/2022 at 11:27 AM

## 2022-06-18 LAB — BACTERIA UR CULT: NO GROWTH

## 2022-07-01 ENCOUNTER — MYC MEDICAL ADVICE (OUTPATIENT)
Dept: FAMILY MEDICINE | Facility: CLINIC | Age: 32
End: 2022-07-01

## 2022-07-01 NOTE — TELEPHONE ENCOUNTER
Patient is questioning if she needs to schedule an ultrasound for the 7/12/22 visit as well as see you. Please advise. Eric LU RN July 1, 2022 1:41 PM

## 2022-07-08 NOTE — PROGRESS NOTES
Clinic Note - Return OB Visit    Marisol Licea is a 31 year old  female who presents to clinic for a follow up OB visit. She is currently 12w4d with an estimated date of delivery of 2023 via LMP c/w 1st tri US. She denies headache, chest pain, shortness of breath, abdominal pain/contractions, vaginal bleeding, or abnormal discharge. She has not felt baby move.     New concerns today:   -overall feeling good - a few headaches but otherwise OK    OB History    Para Term  AB Living   1 0 0 0 0 0   SAB IAB Ectopic Multiple Live Births   0 0 0 0 0      # Outcome Date GA Lbr Joselito/2nd Weight Sex Delivery Anes PTL Lv   1 Current                Physical exam:  /82   Wt 77.6 kg (171 lb)   LMP 04/15/2022   BMI 28.90 kg/m      General: alert, female in no acute distress  Abdomen: gravid uterus appropriate for gestation age above pubic symphysis, non-tender, FHTs 160  Extremities: no edema    Supervision of normal first pregnancy, antepartum  G1 at 12+4 weeks today. Uncomplicated pregnancy. Invitae today.   - Invitae Non-Invasive Prenatal Screening       Blood type O neg - will need Rhogam  Reviewed genetic screening options, including false positive rate of 5% with screening tests and diagnostic options (chorionic villus sampling, amniocentesis), and patient desires.  Continued recommendation for breastfeeding.  Discussed warning signs to watch for and expectations for second trimester.     Follow up in 4 weeks for routine prenatal visit.     Dee Davidson MD  [facility]

## 2022-07-08 NOTE — PATIENT INSTRUCTIONS
Phone Numbers:  [facility]: [phone]  [facility]: [phone]    When to call or come in to the hospital   If you have any bleeding or leakage of fluids.   If you have uterine cramping that does not resolve with rest and fluids.  If you have a headache not resolved with tylenol, right upper abdominal pain, or sudden onset of swelling.  You know your body best. Never hesitate to call or go to the hospital if something doesn't feel right!    Genetic Screening  Sampling of your blood to screen for genetic abnormalities, like Down's syndrome, in your baby  Screening test only - further testing, like advanced ultrasound or amniocentesis, would be needed to confirm positive test  Recommended for mothers over age 35, history of genetic abnormalities - but offered to all pregnant women.  Talk to your doctor if you have further questions, or are interested in this screening test.     Breastfeeding  Breast feeding is best, for you and baby!   Recommendations are for exclusive breastfeeding for babies first 6 months of life.  Talk to your doctor if you have more questions about breastfeeding your baby.    Other Pregnancy Concerns  Continue to take a prenatal vitamin daily  Maintain an active, healthy lifestyle.   If this is your first baby, you can expect to start feeling movement at 20-24 weeks gestation. If this is your second or more pregnancy, you will generally start feeling movement at 18-22 weeks gestation.   [place] parenting classes [email or web address]  [place] parenting classes [email or web address]  Free online classes through [employer]

## 2022-07-12 ENCOUNTER — PRENATAL OFFICE VISIT (OUTPATIENT)
Dept: FAMILY MEDICINE | Facility: CLINIC | Age: 32
End: 2022-07-12
Payer: COMMERCIAL

## 2022-07-12 VITALS — SYSTOLIC BLOOD PRESSURE: 110 MMHG | BODY MASS INDEX: 28.9 KG/M2 | DIASTOLIC BLOOD PRESSURE: 82 MMHG | WEIGHT: 171 LBS

## 2022-07-12 DIAGNOSIS — Z34.00 SUPERVISION OF NORMAL FIRST PREGNANCY, ANTEPARTUM: Primary | ICD-10-CM

## 2022-07-12 PROCEDURE — 99207 PR PRENATAL VISIT: CPT | Performed by: FAMILY MEDICINE

## 2022-07-12 PROCEDURE — 36415 COLL VENOUS BLD VENIPUNCTURE: CPT | Performed by: FAMILY MEDICINE

## 2022-07-12 RX ORDER — POLYETHYLENE GLYCOL 3350 17 G/17G
1 POWDER, FOR SOLUTION ORAL DAILY
COMMUNITY
End: 2023-05-15

## 2022-07-18 ENCOUNTER — MYC MEDICAL ADVICE (OUTPATIENT)
Dept: FAMILY MEDICINE | Facility: CLINIC | Age: 32
End: 2022-07-18

## 2022-07-18 LAB — SCANNED LAB RESULT: NORMAL

## 2022-08-02 ENCOUNTER — VIRTUAL VISIT (OUTPATIENT)
Dept: FAMILY MEDICINE | Facility: CLINIC | Age: 32
End: 2022-08-02
Payer: COMMERCIAL

## 2022-08-02 DIAGNOSIS — U07.1 INFECTION DUE TO 2019 NOVEL CORONAVIRUS: Primary | ICD-10-CM

## 2022-08-02 PROCEDURE — 99213 OFFICE O/P EST LOW 20 MIN: CPT | Mod: CS | Performed by: PHYSICIAN ASSISTANT

## 2022-08-02 NOTE — PROGRESS NOTES
"Marisol is a 31 year old who is being evaluated via a billable telephone visit.      What phone number would you like to be contacted at? [phone]  How would you like to obtain your AVS? MyChart    Assessment & Plan     Infection due to 2019 novel coronavirus  Patient is a 31 YOF who presents to clinic due to symptoms of COVID-19 starting 2 day(s) ago with subsequent positive test results.  Patient is able to speak in full sentences-no signs of respiratory distress. Per CDC criteria, patient qualifies for prescribed treatment of COVID19 as patient meets high risk criteria. Discussed treatment options for COVID-19 as well as risks and benefits.  Patient elects to proceed with Paxlovid.  Discussed home medications and possible interactions.  Also discussed OTC options for managing symptoms of COVID-19.  Return and urgent/emergent follow-up precautions provided.    - nirmatrelvir and ritonavir (PAXLOVID) therapy pack; Take 3 tablets by mouth 2 times daily for 5 days (Take 2 Nirmatrelvir tablets and 1 Ritonavir tablet twice daily for 5 days)      COVID-19 positive patient.  Encounter for consideration of medication intervention. Patient does qualify for a prescription. Full discussion with patient including medication options, risks and benefits. Potential drug interactions reviewed with patient.     Treatment Planned Paxlovid, Rx sent to [place] pharmacy  [facility]   [phone]    [address] [place], [place] [id number]    Hours:  Mon-Fri: 8:30a - 5:00p  Sat-Sun: 9:00a - 1:00p    Drive-thru available   Temporary change to home medications:  None     Estimated body mass index is 28.9 kg/m  as calculated from the following:    Height as of 2/23/22: 1.638 m (5' 4.5\").    Weight as of 7/12/22: 77.6 kg (171 lb).  GFR Estimate   Date Value Ref Range Status   01/07/2019 >60 >60 mL/min/1.73m2 Final     No results found for: [id number]    Return in about 1 week (around 8/9/2022), or if symptoms " worsen or fail to improve.    ESTELA [facility] FRANKI Daomn is a 31 year old, presenting for the following health issues:  Covid Concern      HPI       COVID-19 Symptom Review  How many days ago did these symptoms start? 2 days ago symptoms started with positive COVID19 test this morning. symptoms are improving today. Patient is 15 weeks pregnant. No abdominal pain, bleeding, spotting.     Are any of the following symptoms significant for you?    New or worsening difficulty breathing? No    Worsening cough? No    Fever or chills? Yes, I felt feverish or had chills    Headache: YES    Sore throat: No    Chest pain: No    Diarrhea: No, vomiting-yesterday. Resolved today.     Body aches? No    What treatments has patient tried? Acetaminophen   Does patient live in a nursing home, group home, or shelter? No  Does patient have a way to get food/medications during quarantined? Yes, I have a friend or family member who can help me.          Objective           Vitals:  No vitals were obtained today due to virtual visit.    Physical Exam   healthy, alert and no distress  PSYCH: Alert and oriented times 3; coherent speech, normal   rate and volume, able to articulate logical thoughts, able   to abstract reason, no tangential thoughts, no hallucinations   or delusions  Her affect is normal  RESP: No cough, no audible wheezing, able to talk in full sentences  Remainder of exam unable to be completed due to telephone visits                Phone call duration: 15 minutes    .  ..

## 2022-08-10 ENCOUNTER — PRENATAL OFFICE VISIT (OUTPATIENT)
Dept: FAMILY MEDICINE | Facility: CLINIC | Age: 32
End: 2022-08-10
Payer: COMMERCIAL

## 2022-08-10 VITALS
RESPIRATION RATE: 20 BRPM | BODY MASS INDEX: 30.28 KG/M2 | SYSTOLIC BLOOD PRESSURE: 118 MMHG | WEIGHT: 179.2 LBS | DIASTOLIC BLOOD PRESSURE: 70 MMHG | OXYGEN SATURATION: 99 % | HEART RATE: 75 BPM | TEMPERATURE: 98.1 F

## 2022-08-10 DIAGNOSIS — Z34.00 SUPERVISION OF NORMAL FIRST PREGNANCY, ANTEPARTUM: Primary | ICD-10-CM

## 2022-08-10 DIAGNOSIS — U07.1 COVID-19 AFFECTING PREGNANCY, ANTEPARTUM: ICD-10-CM

## 2022-08-10 DIAGNOSIS — O98.519 COVID-19 AFFECTING PREGNANCY, ANTEPARTUM: ICD-10-CM

## 2022-08-10 PROCEDURE — 99207 PR PRENATAL VISIT: CPT | Performed by: FAMILY MEDICINE

## 2022-08-10 ASSESSMENT — PAIN SCALES - GENERAL: PAINLEVEL: SEVERE PAIN (7)

## 2022-08-10 NOTE — PATIENT INSTRUCTIONS
Phone Numbers:  [facility]: [phone]  [facility]: [phone]    Call [phone] to schedule the ultrasound (on or after 9/2 - 20 weeks)    When to call or come in to the hospital   If you have any bleeding or leakage of fluids.   If you have uterine cramping that does not resolve with rest and fluids.  If you have a headache not resolved with tylenol, right upper abdominal pain, or sudden onset of swelling.  You know your body best. Never hesitate to call or go to the hospital if something doesn't feel right!    Genetic Screening  Sampling of your blood to screen for genetic abnormalities, like Down's syndrome, in your baby  Screening test only - further testing, like advanced ultrasound or amniocentesis, would be needed to confirm positive test  Recommended for mothers over age 35, history of genetic abnormalities - but offered to all pregnant women.  Talk to your doctor if you have further questions, or are interested in this screening test.     Breastfeeding  Breast feeding is best, for you and baby!   Recommendations are for exclusive breastfeeding for babies first 6 months of life.  Talk to your doctor if you have more questions about breastfeeding your baby.    Other Pregnancy Concerns  Continue to take a prenatal vitamin daily  Maintain an active, healthy lifestyle.   If this is your first baby, you can expect to start feeling movement at 20-24 weeks gestation. If this is your second or more pregnancy, you will generally start feeling movement at 18-22 weeks gestation.   [place] parenting classes [email or web address]  [place] parenting classes [email or web address]  Free online classes through [employer]

## 2022-08-10 NOTE — PROGRESS NOTES
Clinic Note - Return OB Visit    Marisol Licea is a 31 year old  female who presents to clinic for a follow up OB visit. She is currently 16w5d with an estimated date of delivery of 2023 via LMP c/w 1st tri US. She denies headache, chest pain, shortness of breath, abdominal pain/contractions, vaginal bleeding, or abnormal discharge. She has not felt baby move.     New concerns today:   -COVID positive  -  had headache and not feeling well but thought was tired, Monday got fever and feeling worse - Tuesday no fever and already starting to feel better - overall feeling better now but still having some headaches, tried tylenol and didn't really help - could probably drink more water, eating regularly, lots of sleep this week - took paxlovid  -blood in stool - thinks from surgery/hemorrhoids - did get a little constipated    OB History    Para Term  AB Living   1 0 0 0 0 0   SAB IAB Ectopic Multiple Live Births   0 0 0 0 0      # Outcome Date GA Lbr Joselito/2nd Weight Sex Delivery Anes PTL Lv   1 Current                Physical exam:  /70 (BP Location: Left arm, Patient Position: Sitting, Cuff Size: Adult Regular)   Pulse 75   Temp 98.1  F (36.7  C) (Temporal)   Resp 20   Wt 81.3 kg (179 lb 3.2 oz)   LMP 04/15/2022   SpO2 99%   BMI 30.28 kg/m      General: alert, female in no acute distress  Abdomen: gravid uterus appropriate for gestation age above pubic symphysis, non-tender, FHTs 150  Extremities: no edema    Supervision of normal first pregnancy, antepartum  G1 at 16+5 weeks today. Pregnancy complicated by COVID infection. Fetal survey ordered today.  - US OB > 14 Weeks    COVID-19 affecting pregnancy, antepartum  Took paxlovid for treatment, improving though still having some headaches - discussed management tips (BP normal and not suspicious for preE). Will plan repeat growth US later in pregnancy.     Reviewed pre-dayne labs: O neg, invitae nml, boy  Continued  recommendation for breastfeeding.  Discussed warning signs to watch for and expectations for second trimester.     Follow up in 4 weeks for routine prenatal visit.     Dee Davidson MD  [facility]

## 2022-09-02 ENCOUNTER — HOSPITAL ENCOUNTER (OUTPATIENT)
Dept: ULTRASOUND IMAGING | Facility: HOSPITAL | Age: 32
Discharge: HOME | End: 2022-09-02
Attending: FAMILY MEDICINE | Admitting: FAMILY MEDICINE
Payer: COMMERCIAL

## 2022-09-02 DIAGNOSIS — U07.1 COVID-19 AFFECTING PREGNANCY, ANTEPARTUM: ICD-10-CM

## 2022-09-02 DIAGNOSIS — O98.519 COVID-19 AFFECTING PREGNANCY, ANTEPARTUM: ICD-10-CM

## 2022-09-02 DIAGNOSIS — Z34.00 SUPERVISION OF NORMAL FIRST PREGNANCY, ANTEPARTUM: ICD-10-CM

## 2022-09-02 PROCEDURE — 76805 OB US >/= 14 WKS SNGL FETUS: CPT

## 2022-09-06 NOTE — PROGRESS NOTES
Clinic Note - Return OB Visit    Marisol Licea is a 31 year old  female who presents to clinic for a follow up OB visit. She is currently 20w5d with an estimated date of delivery of 2023 via LMP c/w 1st tri US. She denies headache, chest pain, shortness of breath, abdominal pain/contractions, vaginal bleeding, or abnormal discharge. She has felt baby move.     New concerns today:   -has nipple rings - thinking may take them out  -both ears itching really bad, weeks  -thinning hair    OB History    Para Term  AB Living   1 0 0 0 0 0   SAB IAB Ectopic Multiple Live Births   0 0 0 0 0      # Outcome Date GA Lbr Joselito/2nd Weight Sex Delivery Anes PTL Lv   1 Current                Physical exam:  /70   Pulse 81   Temp 98.1  F (36.7  C)   Wt 84.5 kg (186 lb 5 oz)   LMP 04/15/2022   SpO2 100%   BMI 31.49 kg/m      General: alert, female in no acute distress  Abdomen: gravid uterus appropriate for gestation age at 20 cm, non-tender, FHTs 145  Extremities: no edema    1. Supervision of normal first pregnancy, antepartum  G1 at 20+5 weeks today. Pregnancy complicated by COVID infection and placenta previa on fetal survey.    2. COVID-19 affecting pregnancy, antepartum  Took paxlovid for treatment. Will plan repeat growth US later in pregnancy (at 28 weeks with previa recheck).    3. Placenta previa antepartum  Seen on fetal survey - will repeat US around 28 weeks for follow-up.     4. Thinning hair  Will check labs as below.   - Hemoglobin; Future  - TSH with free T4 reflex; Future  - Iron; Future    5. Leg cramps in pregnancy  Will check labs today.  - Magnesium; Future    6. Itching of ear  Irritation of ear canal but no sign of infection - will treat with steroid drops as below.  - dexamethasone (DECADRON) 0.1 % ophthalmic suspension; Apply 1-2 drops to both ears 4 times daily for 7 days.  Dispense: 5 mL; Refill: 0      Reviewed pre- labs: O neg, invitae nml, boy    Follow  up in 4 weeks for routine prenatal visit - order growth US next time    Dee Davidson MD  [facility]

## 2022-09-06 NOTE — PATIENT INSTRUCTIONS
Phone Numbers:  [facility]: [phone]  [facility]: [phone]    Sleep: benadryl or unisom (doxylamine)    When to call or come in to the hospital   If you have any bleeding or leakage of fluids.   If you have lower abdominal/uterine cramping not relieved with rest and fluids.  If you have a headache not resolved with tylenol, right upper abdominal pain, or sudden onset of swelling.  You know your body best. Never hesitate to call or go to the hospital if something doesn't feel right!    Fetal survey ultrasound  We will arrange for an ultrasound around 20-22 weeks gestation. This is the ultrasound where we look to see all parts of baby to make sure baby is growing normally! Many families look forward to this visit as a chance to try and determine the babies gender.    Consider childbirth education classes  Childbirth classes are a great way to learn what to expect from the remainder of pregnancy, tips for preparing and handeling the stresses of labor, and learning more about your . Classes are also great for learning more about breastfeeding!    [place] parenting classes [email or web address]  [place] parenting classes [email or web address]-information  Free online classes through [employer]

## 2022-09-07 ENCOUNTER — PRENATAL OFFICE VISIT (OUTPATIENT)
Dept: FAMILY MEDICINE | Facility: CLINIC | Age: 32
End: 2022-09-07
Payer: COMMERCIAL

## 2022-09-07 VITALS
WEIGHT: 186.31 LBS | OXYGEN SATURATION: 100 % | BODY MASS INDEX: 31.49 KG/M2 | SYSTOLIC BLOOD PRESSURE: 100 MMHG | HEART RATE: 81 BPM | DIASTOLIC BLOOD PRESSURE: 70 MMHG | TEMPERATURE: 98.1 F

## 2022-09-07 DIAGNOSIS — U07.1 COVID-19 AFFECTING PREGNANCY, ANTEPARTUM: ICD-10-CM

## 2022-09-07 DIAGNOSIS — L65.9 THINNING HAIR: ICD-10-CM

## 2022-09-07 DIAGNOSIS — O44.00 PLACENTA PREVIA ANTEPARTUM: ICD-10-CM

## 2022-09-07 DIAGNOSIS — L29.9 ITCHING OF EAR: ICD-10-CM

## 2022-09-07 DIAGNOSIS — O98.519 COVID-19 AFFECTING PREGNANCY, ANTEPARTUM: ICD-10-CM

## 2022-09-07 DIAGNOSIS — R25.2 LEG CRAMPS IN PREGNANCY: ICD-10-CM

## 2022-09-07 DIAGNOSIS — Z34.00 SUPERVISION OF NORMAL FIRST PREGNANCY, ANTEPARTUM: Primary | ICD-10-CM

## 2022-09-07 DIAGNOSIS — O99.891 LEG CRAMPS IN PREGNANCY: ICD-10-CM

## 2022-09-07 LAB
HGB BLD-MCNC: 11.2 G/DL (ref 11.7–15.7)
IRON SERPL-MCNC: 101 UG/DL (ref 37–145)
MAGNESIUM SERPL-MCNC: 1.8 MG/DL (ref 1.7–2.3)
TSH SERPL DL<=0.005 MIU/L-ACNC: 0.99 UIU/ML (ref 0.3–4.2)

## 2022-09-07 PROCEDURE — 85018 HEMOGLOBIN: CPT | Performed by: FAMILY MEDICINE

## 2022-09-07 PROCEDURE — 99207 PR PRENATAL VISIT: CPT | Performed by: FAMILY MEDICINE

## 2022-09-07 PROCEDURE — 84443 ASSAY THYROID STIM HORMONE: CPT | Performed by: FAMILY MEDICINE

## 2022-09-07 PROCEDURE — 83540 ASSAY OF IRON: CPT | Performed by: FAMILY MEDICINE

## 2022-09-07 PROCEDURE — 36415 COLL VENOUS BLD VENIPUNCTURE: CPT | Performed by: FAMILY MEDICINE

## 2022-09-07 PROCEDURE — 83735 ASSAY OF MAGNESIUM: CPT | Performed by: FAMILY MEDICINE

## 2022-09-08 ENCOUNTER — MYC MEDICAL ADVICE (OUTPATIENT)
Dept: FAMILY MEDICINE | Facility: CLINIC | Age: 32
End: 2022-09-08

## 2022-09-26 ENCOUNTER — MYC MEDICAL ADVICE (OUTPATIENT)
Dept: FAMILY MEDICINE | Facility: CLINIC | Age: 32
End: 2022-09-26

## 2022-09-26 DIAGNOSIS — Z34.00 SUPERVISION OF NORMAL FIRST PREGNANCY, ANTEPARTUM: Primary | ICD-10-CM

## 2022-09-27 NOTE — TELEPHONE ENCOUNTER
Routing to Dr. Davidson, OB provider, to review/advise on patient's question regarding Unisom.     Sada SULLIVAN RN

## 2022-09-29 RX ORDER — HYDROXYZINE HYDROCHLORIDE 25 MG/1
25-50 TABLET, FILM COATED ORAL
Qty: 90 TABLET | Refills: 1 | Status: SHIPPED | OUTPATIENT
Start: 2022-09-29 | End: 2023-02-24

## 2022-10-02 ENCOUNTER — HEALTH MAINTENANCE LETTER (OUTPATIENT)
Age: 32
End: 2022-10-02

## 2022-10-05 ENCOUNTER — PRENATAL OFFICE VISIT (OUTPATIENT)
Dept: FAMILY MEDICINE | Facility: CLINIC | Age: 32
End: 2022-10-05
Payer: COMMERCIAL

## 2022-10-05 VITALS
SYSTOLIC BLOOD PRESSURE: 110 MMHG | BODY MASS INDEX: 32.07 KG/M2 | WEIGHT: 192.5 LBS | HEIGHT: 65 IN | DIASTOLIC BLOOD PRESSURE: 60 MMHG | RESPIRATION RATE: 24 BRPM | HEART RATE: 80 BPM

## 2022-10-05 DIAGNOSIS — Z34.00 SUPERVISION OF NORMAL FIRST PREGNANCY, ANTEPARTUM: Primary | ICD-10-CM

## 2022-10-05 DIAGNOSIS — O98.519 COVID-19 AFFECTING PREGNANCY, ANTEPARTUM: ICD-10-CM

## 2022-10-05 DIAGNOSIS — O44.00 PLACENTA PREVIA ANTEPARTUM: ICD-10-CM

## 2022-10-05 DIAGNOSIS — U07.1 COVID-19 AFFECTING PREGNANCY, ANTEPARTUM: ICD-10-CM

## 2022-10-05 PROCEDURE — 99207 PR PRENATAL VISIT: CPT | Performed by: FAMILY MEDICINE

## 2022-10-05 RX ORDER — FERROUS SULFATE 325(65) MG
325 TABLET ORAL
COMMUNITY
End: 2023-05-10

## 2022-10-05 NOTE — PATIENT INSTRUCTIONS
Phone Numbers:  [facility]: [phone]  [facility]: [phone]    For vaginal swelling: try maternity shapewear/compression undies (liliana sanchez maybe is a brand)  Call [phone] to schedule follow-up ultrasound for growth and placental location (schedule on or after 10/28 - 28 weeks)  Try docusate stool softener over the counter - can try fiber gummy as well - lots of water - tucks witch hazel, preparation H     When to call or come in to the hospital  If you notice a decrease in your baby's movement.   If your begin to experience contractions that are 5 minutes or less apart and lasting for over 45 seconds, or if contractions are becoming more painful.  If you have any bleeding or leakage of fluids.   If you have a headache not resolved with tylenol, right upper abdominal pain, or sudden onset of swelling.  You know your body best. Never hesitate to call or go to the hospital if something doesn't feel right!    Gestational Diabetes Screen  At your next visit, you will be screened for gestational diabetes. You will drink a sugary drink and then have your blood drawn to see how your body responds to a sugar load. This test takes about an hour to complete - please plan your schedule accordingly!    The Benefits of Breastfeeding   Breastfeeding gives your new baby the very best start. It supplies nutrition, comfort, and love. Experts agree: Breastfeeding is the healthiest choice for babies during the first year of life and beyond. It s healthy for Mom, too. Breastfeeding may be challenging at first. But with support, you and your baby can succeed together.      Healthiest for Baby   Breastmilk is the ideal food for babies. It has all the nutrients your little one needs to grow healthy and strong. Here are some of the many benefits for your baby:   Breastfeeding provides contact that babies love. Frequent skin-to-skin time with Mom is calming and comforting.   Breastmilk is full of antibodies. These are  substances that help your baby fight infection. Breastmilk reduces your baby's risk of respiratory illnesses, ear infections, and diarrhea.   Breastfeeding reduces your baby s risk of allergies, colds, and many other diseases.   Breastmilk changes as your baby grows, meeting her changing needs.   Breastmilk is easy for your baby to digest.   Breastmilk contains DHA, a fat that is good for your baby s developing brain and eyes.   Breastmilk decreases the risk of sudden infant death syndrome (SIDS).    Healthiest for Mom   For many women, breastfeeding is an amazing experience. It creates a strong bond between mother and baby. Other benefits for Mom include:   You can feel proud knowing that your baby is growing healthy and strong because of your milk.   Breastmilk is convenient. It s free, clean, and always the right temperature.   Breastfeeding burns calories. This can help you lose pregnancy weight faster.   Breastfeeding releases hormones that contract the uterus. This helps the uterus return to its normal size after childbirth.   Mothers who breastfeed have a decreased risk of ovarian and breast cancers.    [employer] parenting classes [email or web address]  [place] parenting classes [email or web address]-[email or web address]-information  Free online classes through [employer]

## 2022-10-05 NOTE — PROGRESS NOTES
"Clinic Note - Return OB Visit    Marisol Licea is a 31 year old  female who presents to clinic for a follow up OB visit. She is currently 24w5d with an estimated date of delivery of 2023 via LMP c/w 1st tri US. She denies headache, chest pain, shortness of breath, abdominal pain/contractions, vaginal bleeding, or abnormal discharge. She has felt baby move.     New concerns today:   -labia were really swollen one morning, bilateral - resolved over the course of the day  -wearing compression stockings for leg swelling - has been helpful    OB History    Para Term  AB Living   1 0 0 0 0 0   SAB IAB Ectopic Multiple Live Births   0 0 0 0 0      # Outcome Date GA Lbr Joselito/2nd Weight Sex Delivery Anes PTL Lv   1 Current                Physical exam:  /60   Pulse 80   Resp 24   Ht 1.638 m (5' 4.5\")   Wt 87.3 kg (192 lb 8 oz)   LMP 04/15/2022   BMI 32.53 kg/m      General: alert, female in no acute distress  Abdomen: gravid uterus appropriate for gestation age at 24 cm, non-tender, FHTs 145  Extremities: no edema    1. Supervision of normal first pregnancy, antepartum  G1 at 24+5 weeks today. Pregnancy complicated by COVID infection and placenta previa on fetal survey.    2. COVID-19 affecting pregnancy, antepartum  Took paxlovid for treatment. Repeat growth US ordered for 28 weeks.    3. Placenta previa antepartum  Seen on fetal survey - will repeat US at 28 weeks for follow-up.       Reviewed pre- labs: O neg, invitae nml, boy    Follow up in 4 weeks for routine prenatal visit    Dee Davidson MD  [facility]    "

## 2022-10-28 ENCOUNTER — HOSPITAL ENCOUNTER (OUTPATIENT)
Dept: ULTRASOUND IMAGING | Facility: HOSPITAL | Age: 32
Discharge: HOME | End: 2022-10-28
Attending: FAMILY MEDICINE | Admitting: FAMILY MEDICINE
Payer: COMMERCIAL

## 2022-10-28 ENCOUNTER — MYC MEDICAL ADVICE (OUTPATIENT)
Dept: FAMILY MEDICINE | Facility: CLINIC | Age: 32
End: 2022-10-28

## 2022-10-28 DIAGNOSIS — O44.00 PLACENTA PREVIA ANTEPARTUM: ICD-10-CM

## 2022-10-28 DIAGNOSIS — O98.519 COVID-19 AFFECTING PREGNANCY, ANTEPARTUM: ICD-10-CM

## 2022-10-28 DIAGNOSIS — U07.1 COVID-19 AFFECTING PREGNANCY, ANTEPARTUM: ICD-10-CM

## 2022-10-28 PROCEDURE — 76816 OB US FOLLOW-UP PER FETUS: CPT

## 2022-11-02 ENCOUNTER — PRENATAL OFFICE VISIT (OUTPATIENT)
Dept: FAMILY MEDICINE | Facility: CLINIC | Age: 32
End: 2022-11-02
Payer: COMMERCIAL

## 2022-11-02 VITALS
OXYGEN SATURATION: 98 % | TEMPERATURE: 98.4 F | BODY MASS INDEX: 34.02 KG/M2 | DIASTOLIC BLOOD PRESSURE: 60 MMHG | HEART RATE: 90 BPM | SYSTOLIC BLOOD PRESSURE: 100 MMHG | WEIGHT: 201.31 LBS

## 2022-11-02 DIAGNOSIS — U07.1 COVID-19 AFFECTING PREGNANCY, ANTEPARTUM: ICD-10-CM

## 2022-11-02 DIAGNOSIS — Z34.00 SUPERVISION OF NORMAL FIRST PREGNANCY, ANTEPARTUM: Primary | ICD-10-CM

## 2022-11-02 DIAGNOSIS — M53.3 SACROILIAC JOINT PAIN: ICD-10-CM

## 2022-11-02 DIAGNOSIS — O98.519 COVID-19 AFFECTING PREGNANCY, ANTEPARTUM: ICD-10-CM

## 2022-11-02 LAB
ERYTHROCYTE [DISTWIDTH] IN BLOOD BY AUTOMATED COUNT: 12.7 % (ref 10–15)
GLUCOSE 1H P 50 G GLC PO SERPL-MCNC: 103 MG/DL (ref 70–129)
HCT VFR BLD AUTO: 32.3 % (ref 35–47)
HGB BLD-MCNC: 11.2 G/DL (ref 11.7–15.7)
MCH RBC QN AUTO: 31.2 PG (ref 26.5–33)
MCHC RBC AUTO-ENTMCNC: 34.7 G/DL (ref 31.5–36.5)
MCV RBC AUTO: 90 FL (ref 78–100)
PLATELET # BLD AUTO: 302 10E3/UL (ref 150–450)
RBC # BLD AUTO: 3.59 10E6/UL (ref 3.8–5.2)
WBC # BLD AUTO: 11.3 10E3/UL (ref 4–11)

## 2022-11-02 PROCEDURE — 96372 THER/PROPH/DIAG INJ SC/IM: CPT | Performed by: FAMILY MEDICINE

## 2022-11-02 PROCEDURE — 90471 IMMUNIZATION ADMIN: CPT | Performed by: FAMILY MEDICINE

## 2022-11-02 PROCEDURE — 90715 TDAP VACCINE 7 YRS/> IM: CPT | Performed by: FAMILY MEDICINE

## 2022-11-02 PROCEDURE — 86780 TREPONEMA PALLIDUM: CPT | Performed by: FAMILY MEDICINE

## 2022-11-02 PROCEDURE — 99207 PR PRENATAL VISIT: CPT | Performed by: FAMILY MEDICINE

## 2022-11-02 PROCEDURE — 36415 COLL VENOUS BLD VENIPUNCTURE: CPT | Performed by: FAMILY MEDICINE

## 2022-11-02 PROCEDURE — 82950 GLUCOSE TEST: CPT | Performed by: FAMILY MEDICINE

## 2022-11-02 PROCEDURE — 85027 COMPLETE CBC AUTOMATED: CPT | Performed by: FAMILY MEDICINE

## 2022-11-02 ASSESSMENT — PAIN SCALES - GENERAL: PAINLEVEL: NO PAIN (0)

## 2022-11-02 NOTE — PROGRESS NOTES
Clinic Note - Return OB Visit    Marisol Licea is a 31 year old  female who presents to clinic for a follow up OB visit. She is currently 28w5d with an estimated date of delivery of 2023 via LMP c/w 1st tri US. She denies headache, chest pain, shortness of breath, abdominal pain/contractions, vaginal bleeding, or abnormal discharge. She has felt baby move.     New concerns today:   -pain in butt around SI joints, sometimes shooting/sharp pains or constant ache    OB History    Para Term  AB Living   1 0 0 0 0 0   SAB IAB Ectopic Multiple Live Births   0 0 0 0 0      # Outcome Date GA Lbr Joselito/2nd Weight Sex Delivery Anes PTL Lv   1 Current                Physical exam:  /60   Pulse 90   Temp 98.4  F (36.9  C)   Wt 91.3 kg (201 lb 5 oz)   LMP 04/15/2022   SpO2 98%   BMI 34.02 kg/m      General: alert, female in no acute distress  Abdomen: gravid uterus appropriate for gestation age at 28 cm, non-tender, FHTs 145  Extremities: no edema    Supervision of normal first pregnancy, antepartum  G1 at 28+5 weeks today. Pregnancy complicated by COVID infection. Placenta previa on fetal survey now resolved. 1 hr GCT, CBC, syphilis screen today. Rhogam and Tdap today.    COVID-19 affecting pregnancy, antepartum  Took paxlovid for treatment. Repeat growth US at 28 weeks showed normal growth but abd circ >98%ile - will recheck at 32-36 weeks    Reviewed pre- labs: O neg, invitae nml, boy    Follow up in 2 weeks for routine prenatal visit    Dee Davidson MD  [facility]

## 2022-11-02 NOTE — PATIENT INSTRUCTIONS
Phone Numbers:  [facility]: [phone]  [facility]: [phone]    Can try hydrocortisone cream over the counter on the rash to help with itching  -keep moisturizing!      When to call or come in to the hospital  If you notice a decrease in your baby's movement.   If your begin to experience contractions that are 5 minutes or less apart and lasting for over 45 seconds, or if contractions are becoming more painful.  If you have any bleeding or leakage of fluids.   If you have a headache not resolved with tylenol, right upper abdominal pain, or sudden onset of swelling.  You know your body best. Never hesitate to call or go to the hospital if something doesn't feel right!    [place] parenting classes [email or web address]  [place] parenting classes [email or web address]-delivery-[email or web address]-information  Free online classes through [employer]    Blood Glucose Screening During Pregnancy   Gestational diabetes is diabetes that only pregnant women get. Changes in your body during pregnancy can cause high blood sugar (glucose). This can cause problems for you and your baby. It is a serious condition, but it can be controlled.  What to Know If You Test Positive   Gestational diabetes is treatable. The best way to control gestational diabetes is to find out you have it as early as possible and start treatment quickly.   Gestational diabetes can cause problems for the mother during pregnancy. It can also cause problems with the baby during pregnancy, delivery, and after. Treatment greatly lowers the chance for problems.   The changes in your body that cause gestational diabetes normally occur only when you are pregnant. After the baby is born, your body goes back to normal and the condition goes away. You may be more likely to have type 2 diabetes later, though. So talk to your doctor about ways to help prevent type 2 diabetes.  Treating Gestational Diabetes    You ll need to check your blood sugar regularly. You can do this at home by pricking your finger and checking a drop of blood on a glucose monitor. Your health care provider will show you how and when to check your blood sugar and discuss your target blood sugar level.   To manage your blood sugar, you will be given a special plan. It will likely involve planning your meals and getting regular exercise. Some women need to take a hormone called insulin, or an oral hypoglycemic medication to help control their blood sugar.    Making Plans for Feeding Your Baby  By this point, you probably have read a lot about feeding your baby. Breastfeed or formula? Each mother s decision is her own and we respect you and your choices. We ve gathered information on both breastfeeding and formula feeding to help with your decision. Talking with your physician can help in your decision.     Skin-to-skin contact  Being close to mom helps your baby adjust to life outside of the womb. It helps your baby regulate their body temperature, heart rate, and breathing. Your baby will usually be placed skin-to-skin immediately following birth or as soon as possible, if medical intervention is needed.    Rooming-In  Having your baby stay with you in your room is called  rooming-in.  Keeping your baby in your room helps you to learn how to care for your baby by getting to know your baby s cues, body rhythms and sleep cycle.       Cue-based feeding  Cues (signals) are baby s way of telling you what he or she wants. When you learn your infant s cues, you know how to care for and feed your baby. Feeding cues are the licking and smacking of lips, bringing their fist to their mouth, and a reflex called  rooting - where baby turns and opens his or her mouth, searching for the breast or bottle. Crying is a late feeding cue. Babies can feed frequently, often at least 8 times in 24 hours.  Breastfeeding facts  Breast milk is the best source of nutrition  for your baby and is available at birth. In the first couple of days, your milk volume is already starting to increase, though it may not be noticeable. Breastfeeding frequently to increase your milk supply. Within three to five days, you will begin to notice larger milk volumes. An increase in breast size, heaviness and firmness are often described as the milk  coming in.  Frequent breastfeeding can help breasts from getting overly firm and painful. You will know the baby is getting enough milk if your baby is having wet and dirty diapers and gaining weight.   Positioning and attachment   Get comfortable. Use pillows as needed to support your arms and baby. Hold baby close at the level of your breast, facing you in a tummy to tummy position. Skin to skin helps with this. Position the baby with his or her nose by the nipple. There should be a straight line from baby s ear to shoulder to hips.  Tickle your baby s lips or wait for baby to open mouth wide, bring baby to breast by leading with the chin. Aim the nipple at the roof of baby s mouth. A rapid sucking pattern is followed by longer, drawing pattern with occasional swallows heard. When baby is correctly latched, your nipple and much of the areola are pulled well into baby s mouth.      Returning to work or school  Focus on a good start to breastfeeding. Many women continue to provide breastmilk for their baby when they return to work or school. Making plans about where to pump and store milk can make the transition go well. Talk with other mothers who have also returned to work or school for tips and support. Your employer s Human Resource department may be a resource as well.      babies can mean fewer  sick  days for you.  A quality breast pump will also save time and add comfort. Check with your insurance prior to giving birth for breast pump coverage. Many insurance companies include a pump within your benefits.  Breastfeed when you are with your  baby. Reserve your bottles of breast milk for when you are away.   Your breasts will need to be  emptied  either by your baby or a pump. Plan to pump at least twice in an eight hour day.  If you cannot pump at work, continue breastfeeding at home. Any amount of breast milk is worth giving to your baby.    Formula feeding facts  If you are planning to use formula to feed your baby, you will want to make some preparations ahead of time. Talk to your doctor about what type of formula to use. Some are iron-fortified, meaning they have extra iron in them. You will want to purchase formula and bottles before your baby is born to be sure you are ready after you return from the hospital. The hospitals do not provide formula samples to take home.    Be sure to follow formula mixing directions closely. Regular milk in the dairy case at the grocery store should not be given to babies under 1 year old. Baby formula is sold in several forms including:  Ready-to-use. This is the most expensive, but no mixing is necessary.  Concentrated liquid. This is less expensive than ready-to-use and you mix with water.  Powder. This is the least expensive. You mix one level scoop of powdered formula with two ounces of water and stir well.    How do I warm my baby s bottles?  You may feed your baby a bottle without warming it first. It is OK for the breast milk or formula to be cool or room temperature. If your baby seems to prefer it warmed, you can put the filled bottle in a container of warm water and let it stand for a few minutes. Check the temperature of the liquid on your skin before feeding it to your baby; to be sure it isn t too hot. Do not heat bottles in the microwave. Microwaves heat food and liquids unevenly, and this can cause hot spots that can burn your baby.    How do I clean and sterilize bottles?  Sterilize bottles and nipples before you use them for the first time. You can do this by putting them in boiling water for 5  minutes. After that first time, you can wash them in hot and soapy water. Rinse them carefully to be sure there is no soap left on them. You can also wash them in the .

## 2022-11-03 LAB — T PALLIDUM AB SER QL: NONREACTIVE

## 2022-11-17 ENCOUNTER — NURSE TRIAGE (OUTPATIENT)
Dept: NURSING | Facility: CLINIC | Age: 32
End: 2022-11-17

## 2022-11-17 NOTE — TELEPHONE ENCOUNTER
Pt is calling back again since she has not heard back yet from care team.     Pt was informed of message below and was given the number to the prior colorectal facility she was seen at in the past.     Pt states the nurse mentioned asking about possible creams that she could use that would be safe with pregnancy?     Please review and advise patient with MD recommendations.

## 2022-11-17 NOTE — TELEPHONE ENCOUNTER
Nurse Triage SBAR    Is this a 2nd Level Triage? NO    Situation: constipation    Background:   Pt had hemorrhoidectomy in 2022, got pregnant in May    Pt takes docusate sodium 3 times per day  Took Miralax this AM    Pt is  30w6d    Assessment:   Last BM was 2 days ago, small amount  This morning, tried to pass BM but nothing came out, had brief episode of rectal bleeding which stopped.    Now has on and off mild abdominal cramping  Bad rectal pain    Pt still feels fetal movement, no contraction, no vaginal bleeding reported      Protocol Recommended Disposition:   See in Office Within 3 Days    Recommendation:   Sending message to PCP for recommendations    Pt planning to head home from work. FNA recommended sitz bath.    Routed to provider  Please call pt [phone]  Pt's next prenatal visit on     Does the patient meet one of the following criteria for ADS visit consideration? 16+ years old, with an [facility] PCP     TIP  Providers, please consider if this condition is appropriate for management at one of our Acute and Diagnostic Services sites.     If patient is a good candidate, please use dotphrase <dot>triageresponse and select Refer to ADS to document.    Celia Yen RN/[place] Nurse Advisor        Reason for Disposition    Patient wants to be seen    Additional Information    Negative: Abdomen pain is main symptom and male    Negative: Abdomen pain is main symptom and female    Negative: Rectal bleeding or blood in stool is main symptom    Negative: Vomiting bile (green color)    Negative: Patient sounds very sick or weak to the triager    Negative: Constant abdominal pain lasting > 2 hours    Negative: Vomiting and abdomen looks much more swollen than usual    Negative: Rectal pain or fullness from fecal impaction (rectum full of stool) and NOT better after SITZ bath, suppository or enema    Negative: Abdomen is more swollen than usual    Negative: Last bowel movement (BM) > 4 days  ago    Negative: Leaking stool    Negative: Intermittent mild abdominal pain and fever    Negative: Unable to have a bowel movement (BM) without manually removing stool (using finger to pull out stool or perform disimpaction)    Negative: Unable to have a bowel movement (BM) without using a laxative, suppository, or enema    Negative: Constipation persists > 1 week and no improvement after using CARE ADVICE    Negative: Weight loss greater than 10 pounds (5 kg) and not dieting    Negative: Pencil-like, narrow stools    Protocols used: CONSTIPATION-A-[place]

## 2022-11-17 NOTE — TELEPHONE ENCOUNTER
Provider Response to 2nd Level Triage Request    I have reviewed the RN documentation. My recommendation is:  best plan would be to follow up with GI/colorectal, otherwise home monitoring

## 2022-11-18 NOTE — TELEPHONE ENCOUNTER
Ok to use vaseline or hydrocortisone creams; limited use of preparation H cream is ok. Ice/cool pack to area. Sitz baths. Continue bowel regimen.    Dr Davidson

## 2022-11-28 NOTE — PATIENT INSTRUCTIONS
Phone Numbers:  [facility]: [phone]  [facility]: [phone]    Anxiety/mood:  -start sertraline 25mg daily - can increase to 50mg after 1 week if needed - consider starting after your stomach bug goes away/improves  -can use hydroxyzine 1/2-1 tablet as needed for panic attack during the day - can use 1-2 tablets before bed to help with sleep/anxiety    Carpal tunnel - order wrist brace to keep wrists straight     When to call or come in to the hospital   If you notice a decrease in your baby's movement.   If your begin to experience contractions that are 5 minutes or less apart and lasting for over 45 seconds, or if contractions are becoming more painful.  If you have any bleeding or leakage of fluids.   If you have a headache not resolved with tylenol, right upper abdominal pain, or sudden onset of swelling.  You know your body best. Never hesitate to call or go to the hospital if something doesn't feel right!    After-baby Birth Control Methods   It is important to consider contraception after your baby is born if you would like to prevent a pregnancy in the near future. If you are breast feeding, talk with your doctor to determine the best method for you. It is recommended that you wait 12 months after the birth of your baby to get pregnant again.   Natural Family Planning  It is possible to avoid pregnancy or time them based on your family goals without any hormones or medications or need for condoms. In order to be successful with this method, both partners need to be diligent in tracking fertility/ovulation and abiding by abstinence during certain times of the month to avoid pregnancy.  Condoms   Latex condoms can prevent pregnancy and STDs. Condoms work best when used with spermicide that is placed inside the vagina as well as inside the condom. Use only water-based lubricants. Petroleum based products (such as Vaseline and many massage oils) can weaken the latex and cause it to break.   IUD    IUD's are made of flexible plastic and must be inserted into your uterus by a doctor. The IUD works by stopping the fertilized egg from attaching itself to the uterus. IUDs may increase the risk of getting a pelvic infection (PID), which can lead to infertility if not diagnosed and treated early. This is a great option after delivery of your baby! These last for 3, 5, or 10 years depending up on which type you choose, but can be removed earlier if you decide you would like to get pregnant sooner.  Tubal Ligation & Vasectomy   These are good choices for women and men who know that they do not want to have any more children.     HORMONES   Birth control pills, hormone implants and shots work by stopping ovulation (release of the egg from the ovary). Implants are placed in the upper arm by a minor surgical incision. They last for three years, but can be removed by your doctor if you decide to get pregnant. Hormone injections must be repeated every three months. The Pill must be taken every day.   All hormones can have side effects and create certain health risks. They are very effective in preventing pregnancy but they do not prevent STDs. You can talk more about the risks and benefits with your doctor.     Controlling Back Pain  As your body changes during pregnancy, your back must work in new ways. Back pain is due to many causes. Physical changes in your body can strain your back and its supporting muscles. Also, hormones (chemicals that carry messages throughout the body) increase during pregnancy. This can affect how the muscles and joints work together. All of these changes can lead to pain in the upper or lower back or pelvis. Some pregnant women have sciatica, pain caused by pressure on the sciatic nerve running down the back of the leg. Ask your healthcare provider for specific tips and exercises to help control your back pain.    Tips to Help You Rest  Good rest and sleep will help you feel better. Here are  some ideas:  Ask your partner to massage your shoulders, neck, or back.  Limit the errands you do each day.  Lie down in the afternoon or after work for a few minutes.  Take a warm bath before you go to sleep.  Drink warm milk or teas without caffeine.  Avoid coffee, black tea, and cola.    Preventing Heartburn  Avoid spicy or acidic foods.   Eat small amounts more often.  Wait 2 hours after eating before lying down   Sleep with your upper body raised 6 inches.  May use Tums as needed. Talk to your doctor about other medications to try.     [place] parenting classes [email or web address]  [place] parenting classes [email or web address]-information  Free online classes through [employer]

## 2022-11-28 NOTE — PROGRESS NOTES
Clinic Note - Return OB Visit    Marisol Licea is a 31 year old  female who presents to clinic for a follow up OB visit. She is currently 32w5d with an estimated date of delivery of 2023 via LMP c/w 1st tri US. She denies headache, chest pain, shortness of breath, abdominal pain/contractions, vaginal bleeding, or abnormal discharge. She has felt baby move.     New concerns today:   -3am started having stomach bug symptoms - vomiting and diarrhea  -thin discharge    OB History    Para Term  AB Living   1 0 0 0 0 0   SAB IAB Ectopic Multiple Live Births   0 0 0 0 0      # Outcome Date GA Lbr Joselito/2nd Weight Sex Delivery Anes PTL Lv   1 Current                Physical exam:  /60   Pulse (!) 128   Temp 98.5  F (36.9  C)   Wt 91.4 kg (201 lb 7 oz)   LMP 04/15/2022   SpO2 98%   BMI 34.04 kg/m      General: alert, female in no acute distress  Abdomen: gravid uterus appropriate for gestation age at 32 cm, non-tender, FHTs 150  Extremities: no edema    Supervision of normal first pregnancy, antepartum  G1 at 32+5 weeks today. Pregnancy complicated by COVID infection. Placenta previa on fetal survey now resolved. Stomach bug - discussed supportive cares, zofran sent in. Bilateral carpal tunnel - will trial braces.     COVID-19 affecting pregnancy, antepartum  Took paxlovid for treatment. Repeat growth US at 28 weeks showed normal growth but abd circ >98%ile - will recheck at 32-36 weeks    Anxiety  Will start zoloft, also has hydroxyzine for PRN use.      Reviewed pre-dayne labs: O neg, invitae nml, boy    Follow up in 2 weeks for routine prenatal visit    Dee Davidson MD  [facility]

## 2022-11-30 ENCOUNTER — PRENATAL OFFICE VISIT (OUTPATIENT)
Dept: FAMILY MEDICINE | Facility: CLINIC | Age: 32
End: 2022-11-30
Payer: COMMERCIAL

## 2022-11-30 VITALS
DIASTOLIC BLOOD PRESSURE: 60 MMHG | TEMPERATURE: 98.5 F | SYSTOLIC BLOOD PRESSURE: 110 MMHG | HEART RATE: 128 BPM | BODY MASS INDEX: 34.04 KG/M2 | WEIGHT: 201.44 LBS | OXYGEN SATURATION: 98 %

## 2022-11-30 DIAGNOSIS — Z34.00 SUPERVISION OF NORMAL FIRST PREGNANCY, ANTEPARTUM: Primary | ICD-10-CM

## 2022-11-30 DIAGNOSIS — F41.9 ANXIETY: ICD-10-CM

## 2022-11-30 DIAGNOSIS — U07.1 COVID-19 AFFECTING PREGNANCY, ANTEPARTUM: ICD-10-CM

## 2022-11-30 DIAGNOSIS — O21.9 VOMITING DURING PREGNANCY: ICD-10-CM

## 2022-11-30 DIAGNOSIS — O98.519 COVID-19 AFFECTING PREGNANCY, ANTEPARTUM: ICD-10-CM

## 2022-11-30 PROCEDURE — 99213 OFFICE O/P EST LOW 20 MIN: CPT | Performed by: FAMILY MEDICINE

## 2022-11-30 PROCEDURE — 99207 PR PRENATAL VISIT: CPT | Performed by: FAMILY MEDICINE

## 2022-11-30 RX ORDER — SERTRALINE HYDROCHLORIDE 25 MG/1
25 TABLET, FILM COATED ORAL DAILY
Qty: 90 TABLET | Refills: 1 | Status: SHIPPED | OUTPATIENT
Start: 2022-11-30 | End: 2023-01-03

## 2022-11-30 RX ORDER — ONDANSETRON 4 MG/1
4 TABLET, ORALLY DISINTEGRATING ORAL EVERY 8 HOURS PRN
Qty: 20 TABLET | Refills: 1 | Status: SHIPPED | OUTPATIENT
Start: 2022-11-30 | End: 2023-01-04

## 2022-11-30 ASSESSMENT — PAIN SCALES - GENERAL: PAINLEVEL: MILD PAIN (2)

## 2022-12-04 ENCOUNTER — MYC MEDICAL ADVICE (OUTPATIENT)
Dept: FAMILY MEDICINE | Facility: CLINIC | Age: 32
End: 2022-12-04

## 2022-12-04 DIAGNOSIS — O99.713 PRURITUS OF PREGNANCY IN THIRD TRIMESTER: Primary | ICD-10-CM

## 2022-12-04 DIAGNOSIS — L29.9 PRURITUS OF PREGNANCY IN THIRD TRIMESTER: Primary | ICD-10-CM

## 2022-12-05 ENCOUNTER — LAB (OUTPATIENT)
Dept: LAB | Facility: CLINIC | Age: 32
End: 2022-12-05
Payer: COMMERCIAL

## 2022-12-05 DIAGNOSIS — L29.9 PRURITUS OF PREGNANCY IN THIRD TRIMESTER: ICD-10-CM

## 2022-12-05 DIAGNOSIS — O99.713 PRURITUS OF PREGNANCY IN THIRD TRIMESTER: ICD-10-CM

## 2022-12-05 LAB
ALBUMIN SERPL BCG-MCNC: 3.7 G/DL (ref 3.5–5.2)
ALP SERPL-CCNC: 118 U/L (ref 35–104)
ALT SERPL W P-5'-P-CCNC: 61 U/L (ref 10–35)
AST SERPL W P-5'-P-CCNC: 41 U/L (ref 10–35)
BILIRUB DIRECT SERPL-MCNC: <0.2 MG/DL (ref 0–0.3)
BILIRUB SERPL-MCNC: 0.4 MG/DL
PROT SERPL-MCNC: 6.5 G/DL (ref 6.4–8.3)

## 2022-12-05 PROCEDURE — 82239 BILE ACIDS TOTAL: CPT | Mod: 90

## 2022-12-05 PROCEDURE — 99000 SPECIMEN HANDLING OFFICE-LAB: CPT

## 2022-12-05 PROCEDURE — 36415 COLL VENOUS BLD VENIPUNCTURE: CPT

## 2022-12-05 PROCEDURE — 80076 HEPATIC FUNCTION PANEL: CPT

## 2022-12-05 NOTE — TELEPHONE ENCOUNTER
Dr. Davidson would like me to order some labs to investigate this further.  I would keep this note in her bucket in case she would like to add the patient on later this week.  Patient can come in for a lab visit in the meantime.

## 2022-12-05 NOTE — TELEPHONE ENCOUNTER
Relayed message from Dr. Martel to patient via [facility] and instructed her to make a lab only appointment in the next few days. Crystal Nina RN on 12/5/2022 at 12:50 PM

## 2022-12-06 ENCOUNTER — MYC MEDICAL ADVICE (OUTPATIENT)
Dept: FAMILY MEDICINE | Facility: CLINIC | Age: 32
End: 2022-12-06

## 2022-12-06 ENCOUNTER — NURSE TRIAGE (OUTPATIENT)
Dept: NURSING | Facility: CLINIC | Age: 32
End: 2022-12-06

## 2022-12-06 DIAGNOSIS — O26.643 CHOLESTASIS DURING PREGNANCY IN THIRD TRIMESTER: Primary | ICD-10-CM

## 2022-12-06 DIAGNOSIS — U07.1 COVID-19 AFFECTING PREGNANCY, ANTEPARTUM: ICD-10-CM

## 2022-12-06 DIAGNOSIS — O98.519 COVID-19 AFFECTING PREGNANCY, ANTEPARTUM: ICD-10-CM

## 2022-12-06 LAB — BILE AC SERPL-SCNC: 50 UMOL/L

## 2022-12-07 ENCOUNTER — HOSPITAL ENCOUNTER (OUTPATIENT)
Dept: ULTRASOUND IMAGING | Facility: HOSPITAL | Age: 32
Discharge: HOME | End: 2022-12-07
Attending: FAMILY MEDICINE | Admitting: FAMILY MEDICINE
Payer: COMMERCIAL

## 2022-12-07 DIAGNOSIS — O98.519 COVID-19 AFFECTING PREGNANCY, ANTEPARTUM: ICD-10-CM

## 2022-12-07 DIAGNOSIS — U07.1 COVID-19 AFFECTING PREGNANCY, ANTEPARTUM: ICD-10-CM

## 2022-12-07 DIAGNOSIS — O26.643 CHOLESTASIS DURING PREGNANCY IN THIRD TRIMESTER: ICD-10-CM

## 2022-12-07 PROCEDURE — 76816 OB US FOLLOW-UP PER FETUS: CPT

## 2022-12-07 RX ORDER — URSODIOL 300 MG/1
300 CAPSULE ORAL 3 TIMES DAILY
Qty: 60 CAPSULE | Refills: 0 | Status: ON HOLD | OUTPATIENT
Start: 2022-12-07 | End: 2022-12-30

## 2022-12-07 NOTE — TELEPHONE ENCOUNTER
33w4d, .     Dr Davidson @ [facility].      Pt saw  bile acids, total was high and she is worried.   Advised note sent to Dr Davidson's team - requesting they call pt tomorrow to discuss result. Pt voiced understanding and agreement.       Reason for Disposition    Caller requesting lab results  (Exception: Routine or non-urgent lab result.)    Additional Information    Negative: Lab calling with strep throat test results and triager can call in prescription    Negative: Lab calling with urinalysis test results and triager can call in prescription    Negative: Medication questions    Negative: Medication renewal and refill questions    Negative: Pre-operative or pre-procedural questions    Negative: ED call to PCP (i.e., primary care provider; doctor, NP, or PA)    Negative: Doctor (or NP/PA) call to PCP    Negative: Call about patient who is currently hospitalized    Negative: Lab or radiology calling with CRITICAL test results    Negative: [1] Follow-up call from patient regarding patient's clinical status AND [2] information urgent    Negative: [1] Caller requests to speak ONLY to PCP AND [2] URGENT question    Negative: [1] Caller requests to speak to PCP now AND [2] won't tell us reason for call  (Exception: If 10 pm to 6 am, caller must first discuss reason for the call.)    Negative: Notification of hospital admission    Negative: Notification of death    Protocols used: [facility]CP CALL - [facility]

## 2022-12-09 ENCOUNTER — PRENATAL OFFICE VISIT (OUTPATIENT)
Dept: FAMILY MEDICINE | Facility: CLINIC | Age: 32
End: 2022-12-09
Payer: COMMERCIAL

## 2022-12-09 ENCOUNTER — HOSPITAL ENCOUNTER (OUTPATIENT)
Dept: ULTRASOUND IMAGING | Facility: HOSPITAL | Age: 32
Discharge: HOME | End: 2022-12-09
Attending: FAMILY MEDICINE | Admitting: FAMILY MEDICINE
Payer: COMMERCIAL

## 2022-12-09 VITALS
WEIGHT: 204 LBS | TEMPERATURE: 99.4 F | OXYGEN SATURATION: 99 % | DIASTOLIC BLOOD PRESSURE: 60 MMHG | HEART RATE: 91 BPM | BODY MASS INDEX: 34.48 KG/M2 | SYSTOLIC BLOOD PRESSURE: 110 MMHG

## 2022-12-09 DIAGNOSIS — O26.643 CHOLESTASIS DURING PREGNANCY IN THIRD TRIMESTER: ICD-10-CM

## 2022-12-09 DIAGNOSIS — O09.90 PREGNANCY, SUPERVISION, HIGH-RISK, UNSPECIFIED TRIMESTER: Primary | ICD-10-CM

## 2022-12-09 DIAGNOSIS — U07.1 COVID-19 AFFECTING PREGNANCY, ANTEPARTUM: ICD-10-CM

## 2022-12-09 DIAGNOSIS — F41.9 ANXIETY: ICD-10-CM

## 2022-12-09 DIAGNOSIS — O98.519 COVID-19 AFFECTING PREGNANCY, ANTEPARTUM: ICD-10-CM

## 2022-12-09 PROCEDURE — 76819 FETAL BIOPHYS PROFIL W/O NST: CPT

## 2022-12-09 PROCEDURE — 99207 PR PRENATAL VISIT: CPT | Performed by: FAMILY MEDICINE

## 2022-12-09 NOTE — PATIENT INSTRUCTIONS
Phone Numbers:  [facility]: [phone]  [facility]: [phone]     When to call or come in to the hospital  If you notice a decrease in your baby's movement.   If your begin to experience contractions that are 5 minutes or less apart and lasting for over 45 seconds, or if contractions are becoming more painful.  If you have any bleeding or leakage of fluids.   If you have a headache not resolved with tylenol, right upper abdominal pain, or sudden onset of swelling.  You know your body best. Never hesitate to call or go to the hospital if something doesn't feel right!    Preparing for the hospital  You re likely feeling anxious as your child s birth approaches. This is normal. To give yourself some peace of mind, pack a bag 3-4 weeks before your due date. Here is a list of things to remember:  Personal care items like toothbrush, hair brush, lip balm, lotion, shampoo, glasses, contacts  Nightgown, bathrobe, slippers  Several pairs of underwear  Comfortable clothes for you to wear home  Camera with new batteries  Cell phone and   Insurance information and any other paperwork needed for your hospital stay  Clothes for baby to wear home  An infant, rear-facing car seat for bringing home your baby (this is required by law)    Managing Labor Pain   There are many ways to manage pain during labor. It can often be done with no anesthesia or strong pain medications. Talk to your health care provider about any options you would like to explore.     Help from Relaxation  Some of these are learned in special classes. Your health care provider can help you find classes. The hospital has a tub you can use during early labor. These methods may be of help to you:   Breathing techniques   A warm tub between contractions   Massage and therapeutic touch by your support person or labor    Reading materials that are comforting or inspiring   Music that is soothing   Hypnosis   Acupuncture and acupressure   Heat and  cold application    Help From Analgesics   Analgesics are mild medications that reduce pain. They can be used along with some relaxation methods. They can give you pain relief without total loss of feeling. They may lessen the pain of strong contractions. You may feel well enough to nap between contractions. They have little effect on your baby if given early in labor. This may be done by injection or by IV.     Help From Anesthetics   Anesthesia involves blockage of all feeling including pain. It can be given in the form of local anesthesia or general anesthesia.  Anesthesia is a type of medication to prevent pain. It is often used in labor. It may numb only one region of your body. This is called regional anesthesia. Or it may let you sleep during surgery. This is called general anesthesia. This type of medication is given by a trained specialist. When possible, regional anesthesia will be used. This is so you can be awake during your baby s birth.     Regional Anesthesia   Regional anesthesia may be used to numb your lower body for a vaginal or  birth. It does not go into your bloodstream. This means that little or none of it will reach your baby. There are two kinds:   Epidural. This is most often given while you sit up or lie on your side. A needle with a flexible tube (catheter) is put in your lower back. The needle is then removed. The anesthetic is sent through the catheter. A pump may be attached. This gives you a constant level of anesthetic. An epidural often only partly affects muscle control. This means you will still be able to push for a vaginal birth.   Spinal. This is most often given in one dose right before delivery. It acts fast. You may sit up or lie down when it is injected. It may affect muscle control in your lower body. This includes the ability to push.    General Anesthesia   General anesthesia lets you sleep and keeps you free of pain during surgery. It may be used for a .  It may be given as an injection. It may be given as an inhaled gas. Or it may be given as both. Delivery often occurs before the medication has reached the baby.    [place] parenting classes [email or web address]  [place] parenting classes [email or web address]-delivery-[email or web address]-information  Free online classes through [employer]

## 2022-12-09 NOTE — PROGRESS NOTES
Clinic Note - Return OB Visit    Marisol Licea is a 31 year old  female who presents to clinic for a follow up OB visit. She is currently 34w0d with an estimated date of delivery of 2023 via LMP c/w 1st tri US. She denies headache, chest pain, shortness of breath, abdominal pain/contractions, vaginal bleeding, or abnormal discharge. She has felt baby move.     New concerns today:   -taking ursodiol  -did not start zoloft    OB History    Para Term  AB Living   1 0 0 0 0 0   SAB IAB Ectopic Multiple Live Births   0 0 0 0 0      # Outcome Date GA Lbr Joselito/2nd Weight Sex Delivery Anes PTL Lv   1 Current                Physical exam:  /60   Pulse 91   Temp 99.4  F (37.4  C)   Wt 92.5 kg (204 lb)   LMP 04/15/2022   SpO2 99%   BMI 34.48 kg/m      General: alert, female in no acute distress  Abdomen: gravid uterus at 33 cm, non-tender, FHTs 145  Extremities: no edema    Supervision of high risk pregnancy, antepartum  G1 at 34+0 weeks today. Pregnancy complicated by cholestasis, COVID infection, resolved placenta previa.     Cholestasis of pregnancy  Pt developed classic symptoms - bile acid level 50 and mildly elevated LFTs - discussed plan:  -start ursodiol 300mg three times daily  -can also use calamine lotion/hydroxyzine/antihistamine for itching  -twice weekly BPP   -weekly visits with me  -weekly labs  -plan delivery/induction at 36 weeks at [facility] ()    COVID-19 affecting pregnancy, antepartum  Took paxlovid for treatment. Repeat growth US at 28 weeks showed normal growth but abd circ >98%ile - will recheck at 32-36 weeks    Anxiety  Taking zoloft, also has hydroxyzine for PRN use.      Reviewed pre-dayne labs: O neg, invitae nml, boy    Follow up in 1 week for routine prenatal visit    Dee Davidson MD  [facility] [place]

## 2022-12-13 ENCOUNTER — HOSPITAL ENCOUNTER (OUTPATIENT)
Dept: ULTRASOUND IMAGING | Facility: HOSPITAL | Age: 32
Discharge: HOME | End: 2022-12-13
Attending: FAMILY MEDICINE | Admitting: FAMILY MEDICINE
Payer: COMMERCIAL

## 2022-12-13 DIAGNOSIS — O26.643 CHOLESTASIS DURING PREGNANCY IN THIRD TRIMESTER: ICD-10-CM

## 2022-12-13 PROCEDURE — 76819 FETAL BIOPHYS PROFIL W/O NST: CPT

## 2022-12-14 ENCOUNTER — PRENATAL OFFICE VISIT (OUTPATIENT)
Dept: FAMILY MEDICINE | Facility: CLINIC | Age: 32
End: 2022-12-14
Payer: COMMERCIAL

## 2022-12-14 VITALS
DIASTOLIC BLOOD PRESSURE: 68 MMHG | SYSTOLIC BLOOD PRESSURE: 100 MMHG | RESPIRATION RATE: 20 BRPM | TEMPERATURE: 96.8 F | HEART RATE: 81 BPM | WEIGHT: 204.4 LBS | OXYGEN SATURATION: 98 % | BODY MASS INDEX: 34.54 KG/M2

## 2022-12-14 DIAGNOSIS — F41.9 ANXIETY: ICD-10-CM

## 2022-12-14 DIAGNOSIS — O26.643 CHOLESTASIS DURING PREGNANCY IN THIRD TRIMESTER: ICD-10-CM

## 2022-12-14 DIAGNOSIS — O09.90 PREGNANCY, SUPERVISION, HIGH-RISK, UNSPECIFIED TRIMESTER: Primary | ICD-10-CM

## 2022-12-14 DIAGNOSIS — U07.1 COVID-19 AFFECTING PREGNANCY, ANTEPARTUM: ICD-10-CM

## 2022-12-14 DIAGNOSIS — N89.8 VAGINAL DISCHARGE: ICD-10-CM

## 2022-12-14 DIAGNOSIS — O98.519 COVID-19 AFFECTING PREGNANCY, ANTEPARTUM: ICD-10-CM

## 2022-12-14 LAB
ALBUMIN SERPL BCG-MCNC: 3.6 G/DL (ref 3.5–5.2)
ALP SERPL-CCNC: 134 U/L (ref 35–104)
ALT SERPL W P-5'-P-CCNC: 54 U/L (ref 10–35)
AST SERPL W P-5'-P-CCNC: 25 U/L (ref 10–35)
BILIRUB DIRECT SERPL-MCNC: <0.2 MG/DL (ref 0–0.3)
BILIRUB SERPL-MCNC: 0.2 MG/DL
CLUE CELLS: PRESENT
ERYTHROCYTE [DISTWIDTH] IN BLOOD BY AUTOMATED COUNT: 12.4 % (ref 10–15)
HCT VFR BLD AUTO: 33.8 % (ref 35–47)
HGB BLD-MCNC: 11.8 G/DL (ref 11.7–15.7)
MCH RBC QN AUTO: 31.1 PG (ref 26.5–33)
MCHC RBC AUTO-ENTMCNC: 34.9 G/DL (ref 31.5–36.5)
MCV RBC AUTO: 89 FL (ref 78–100)
PLATELET # BLD AUTO: 358 10E3/UL (ref 150–450)
PROT SERPL-MCNC: 6.6 G/DL (ref 6.4–8.3)
RBC # BLD AUTO: 3.8 10E6/UL (ref 3.8–5.2)
TRICHOMONAS, WET PREP: ABNORMAL
WBC # BLD AUTO: 9.5 10E3/UL (ref 4–11)
WBC'S/HIGH POWER FIELD, WET PREP: ABNORMAL
YEAST, WET PREP: ABNORMAL

## 2022-12-14 PROCEDURE — 87653 STREP B DNA AMP PROBE: CPT | Performed by: FAMILY MEDICINE

## 2022-12-14 PROCEDURE — 85027 COMPLETE CBC AUTOMATED: CPT | Performed by: FAMILY MEDICINE

## 2022-12-14 PROCEDURE — 99000 SPECIMEN HANDLING OFFICE-LAB: CPT | Performed by: FAMILY MEDICINE

## 2022-12-14 PROCEDURE — 80076 HEPATIC FUNCTION PANEL: CPT | Performed by: FAMILY MEDICINE

## 2022-12-14 PROCEDURE — 36415 COLL VENOUS BLD VENIPUNCTURE: CPT | Performed by: FAMILY MEDICINE

## 2022-12-14 PROCEDURE — 99214 OFFICE O/P EST MOD 30 MIN: CPT | Performed by: FAMILY MEDICINE

## 2022-12-14 PROCEDURE — 82239 BILE ACIDS TOTAL: CPT | Mod: 90 | Performed by: FAMILY MEDICINE

## 2022-12-14 PROCEDURE — 87210 SMEAR WET MOUNT SALINE/INK: CPT | Performed by: FAMILY MEDICINE

## 2022-12-14 PROCEDURE — 99207 PR PRENATAL VISIT: CPT | Performed by: FAMILY MEDICINE

## 2022-12-14 RX ORDER — METRONIDAZOLE 500 MG/1
500 TABLET ORAL 2 TIMES DAILY
Qty: 14 TABLET | Refills: 0 | Status: SHIPPED | OUTPATIENT
Start: 2022-12-14 | End: 2022-12-21

## 2022-12-14 ASSESSMENT — PAIN SCALES - GENERAL: PAINLEVEL: NO PAIN (0)

## 2022-12-14 NOTE — PATIENT INSTRUCTIONS
Phone Numbers:  [facility]: [phone]  [facility]: [phone]     When to call or come in to the hospital  If you notice a decrease in your baby's movement.   If your begin to experience contractions that are 5 minutes or less apart and lasting for over 45 seconds, or if contractions are becoming more painful.  If you have any bleeding or leakage of fluids.   If you have a headache not resolved with tylenol, right upper abdominal pain, or sudden onset of swelling.  You know your body best. Never hesitate to call or go to the hospital if something doesn't feel right!    Preparing for the hospital  You re likely feeling anxious as your child s birth approaches. This is normal. To give yourself some peace of mind, pack a bag 3-4 weeks before your due date. Here is a list of things to remember:  Personal care items like toothbrush, hair brush, lip balm, lotion, shampoo, glasses, contacts  Nightgown, bathrobe, slippers  Several pairs of underwear  Comfortable clothes for you to wear home  Camera with new batteries  Cell phone and   Insurance information and any other paperwork needed for your hospital stay  Clothes for baby to wear home  An infant, rear-facing car seat for bringing home your baby (this is required by law)    Managing Labor Pain   There are many ways to manage pain during labor. It can often be done with no anesthesia or strong pain medications. Talk to your health care provider about any options you would like to explore.     Help from Relaxation  Some of these are learned in special classes. Your health care provider can help you find classes. The hospital has a tub you can use during early labor. These methods may be of help to you:   Breathing techniques   A warm tub between contractions   Massage and therapeutic touch by your support person or labor    Reading materials that are comforting or inspiring   Music that is soothing   Hypnosis   Acupuncture and acupressure   Heat and  cold application    Help From Analgesics   Analgesics are mild medications that reduce pain. They can be used along with some relaxation methods. They can give you pain relief without total loss of feeling. They may lessen the pain of strong contractions. You may feel well enough to nap between contractions. They have little effect on your baby if given early in labor. This may be done by injection or by IV.     Help From Anesthetics   Anesthesia involves blockage of all feeling including pain. It can be given in the form of local anesthesia or general anesthesia.  Anesthesia is a type of medication to prevent pain. It is often used in labor. It may numb only one region of your body. This is called regional anesthesia. Or it may let you sleep during surgery. This is called general anesthesia. This type of medication is given by a trained specialist. When possible, regional anesthesia will be used. This is so you can be awake during your baby s birth.     Regional Anesthesia   Regional anesthesia may be used to numb your lower body for a vaginal or  birth. It does not go into your bloodstream. This means that little or none of it will reach your baby. There are two kinds:   Epidural. This is most often given while you sit up or lie on your side. A needle with a flexible tube (catheter) is put in your lower back. The needle is then removed. The anesthetic is sent through the catheter. A pump may be attached. This gives you a constant level of anesthetic. An epidural often only partly affects muscle control. This means you will still be able to push for a vaginal birth.   Spinal. This is most often given in one dose right before delivery. It acts fast. You may sit up or lie down when it is injected. It may affect muscle control in your lower body. This includes the ability to push.    General Anesthesia   General anesthesia lets you sleep and keeps you free of pain during surgery. It may be used for a .  It may be given as an injection. It may be given as an inhaled gas. Or it may be given as both. Delivery often occurs before the medication has reached the baby.    [place] parenting classes [email or web address]  [place] parenting classes [email or web address]-information  Free online classes through [employer]

## 2022-12-14 NOTE — PROGRESS NOTES
Clinic Note - Return OB Visit    Marisol Licea is a 31 year old  female who presents to clinic for a follow up OB visit. She is currently 34w5d with an estimated date of delivery of 2023 via LMP c/w 1st tri US. She denies headache, chest pain, shortness of breath, abdominal pain/contractions, vaginal bleeding, or abnormal discharge. She has felt baby move.     New concerns today:   -still itchy - taking ursodiol  -some vaginal discharge    OB History    Para Term  AB Living   1 0 0 0 0 0   SAB IAB Ectopic Multiple Live Births   0 0 0 0 0      # Outcome Date GA Lbr Joselito/2nd Weight Sex Delivery Anes PTL Lv   1 Current                Physical exam:  /68 (BP Location: Left arm, Patient Position: Sitting, Cuff Size: Adult Regular)   Pulse 81   Temp 96.8  F (36  C) (Temporal)   Resp 20   Wt 92.7 kg (204 lb 6.4 oz)   LMP 04/15/2022   SpO2 98%   BMI 34.54 kg/m      General: alert, female in no acute distress  Abdomen: gravid uterus at 34 cm, non-tender, FHTs 150  Genital: discharge noted at introitus (white/thick)  Extremities: no edema    Supervision of high risk pregnancy, antepartum  G1 at 34+5 weeks today. Pregnancy complicated by cholestasis, COVID infection, resolved placenta previa.     Cholestasis of pregnancy  Pt developed classic symptoms - bile acid level 50 and mildly elevated LFTs - discussed plan:  -start ursodiol 300mg three times daily  -can also use calamine lotion/hydroxyzine/antihistamine for itching  -twice weekly BPP   -weekly visits with me  -weekly labs  -plan delivery/induction at 36 weeks at [facility] ()    COVID-19 affecting pregnancy, antepartum  Took paxlovid for treatment. Repeat growth US at 28 weeks showed normal growth but abd circ >98%ile - recheck US showed measurements within normal limits.     Anxiety  Previously prescribed zoloft, also has hydroxyzine for PRN use.    Wet prep  Vaginal discharge and irritation reported, wet prep  obtained and positive for BV, will treat with flagyl.      Reviewed pre-dayne labs: O neg, invitae nml, boy    Follow up in 1 week for routine prenatal visit    Dee Davidson MD  [facility]

## 2022-12-15 ENCOUNTER — PATIENT OUTREACH (OUTPATIENT)
Dept: CARE COORDINATION | Facility: CLINIC | Age: 32
End: 2022-12-15

## 2022-12-15 LAB — GP B STREP DNA SPEC QL NAA+PROBE: NEGATIVE

## 2022-12-15 NOTE — PROGRESS NOTES
Clinic Care Coordination Contact  Community Health Worker Initial Outreach    CHW Initial Information Gathering:  Referral Source: PCP  Preferred Hospital: [facility]  [phone]  Current living arrangement:: I live alone  Type of residence:: Apartment  Community Resources: None  Supplies Currently Used at Home: None  Equipment Currently Used at Home: none  Informal Support system:: Family  No PCP office visit in Past Year: No  Transportation means:: Accessible car  CHW Additional Questions  MyChart active?: Yes  Patient sent Social Determinants of Health questionnaire?: Yes    CHW Note:    CHW contacted patient regarding a referral to CCC. CHW introduced self and role of CCC.    Patient states that she is interested in exploring insurance options. Patient states she has insurance but the coverage is minimal. Patient states due to her pregnancy she has acquired medical bills and would like assistance with this. Patient is unsure if she would qualify for county benefits but states she is interested in financial resources as well.    CHW scheduled patient with CCC MEGHAN Potts on 12/22/22 at 9AM to discuss financial resources and insurance options. Patient states she might be induced tomorrow or next week so she may have to reschedule. CHW gave patient contact number and encouraged her to reach out if needed.     Patient accepts CC: Yes. Patient scheduled for assessment with SHE Potts on 12/22/22 at 9AM. Patient noted desire to discuss insurance resources/transportation resources .       Lynn Martel  Community Health Worker   [facility] Care Coordination  [facility]   [email or web address]  Office: [phone]

## 2022-12-16 ENCOUNTER — MYC MEDICAL ADVICE (OUTPATIENT)
Dept: FAMILY MEDICINE | Facility: CLINIC | Age: 32
End: 2022-12-16

## 2022-12-16 ENCOUNTER — HOSPITAL ENCOUNTER (OUTPATIENT)
Dept: ULTRASOUND IMAGING | Facility: HOSPITAL | Age: 32
Discharge: HOME | End: 2022-12-16
Attending: FAMILY MEDICINE | Admitting: FAMILY MEDICINE
Payer: COMMERCIAL

## 2022-12-16 DIAGNOSIS — O26.643 CHOLESTASIS DURING PREGNANCY IN THIRD TRIMESTER: ICD-10-CM

## 2022-12-16 LAB — BILE AC SERPL-SCNC: 9 UMOL/L

## 2022-12-16 PROCEDURE — 76819 FETAL BIOPHYS PROFIL W/O NST: CPT

## 2022-12-19 ENCOUNTER — HOSPITAL ENCOUNTER (OUTPATIENT)
Dept: ULTRASOUND IMAGING | Facility: HOSPITAL | Age: 32
Discharge: HOME | End: 2022-12-19
Attending: FAMILY MEDICINE | Admitting: FAMILY MEDICINE
Payer: COMMERCIAL

## 2022-12-19 DIAGNOSIS — O26.643 CHOLESTASIS DURING PREGNANCY IN THIRD TRIMESTER: ICD-10-CM

## 2022-12-19 PROCEDURE — 76819 FETAL BIOPHYS PROFIL W/O NST: CPT

## 2022-12-19 NOTE — PROGRESS NOTES
Clinic Note - Return OB Visit    Marisol Licea is a 31 year old  female who presents to clinic for a follow up OB visit. She is currently 35w4d with an estimated date of delivery of 2023 via LMP c/w 1st tri US. She denies headache, chest pain, shortness of breath, abdominal pain/contractions, vaginal bleeding, or abnormal discharge. She has felt baby move.     New concerns today:   -still itchy but improving - taking ursodiol  -planned induction on Friday    OB History    Para Term  AB Living   1 0 0 0 0 0   SAB IAB Ectopic Multiple Live Births   0 0 0 0 0      # Outcome Date GA Lbr Joselito/2nd Weight Sex Delivery Anes PTL Lv   1 Current                Physical exam:  BP 98/66 (BP Location: Left arm, Patient Position: Sitting, Cuff Size: Adult Large)   Pulse 93   Temp 97.3  F (36.3  C) (Temporal)   Wt 94.2 kg (207 lb 11.2 oz)   LMP 04/15/2022   SpO2 97%   BMI 35.10 kg/m      General: alert, female in no acute distress  Abdomen: gravid uterus at 36 cm, non-tender, FHTs 140  Cervix: closed, posterior  Extremities: no edema    Supervision of high risk pregnancy, antepartum  G1 at 35+4 weeks today. Pregnancy complicated by cholestasis, COVID infection, resolved placenta previa.     Cholestasis of pregnancy  Pt developed classic symptoms - bile acid level 50 and mildly elevated LFTs - discussed plan:  -start ursodiol 300mg three times daily  -can also use calamine lotion/hydroxyzine/antihistamine for itching  -twice weekly BPP   -weekly visits with me  -weekly labs  -plan delivery/induction at 36 weeks at [facility] on  - pt on schedule for 7pm ripening    COVID-19 affecting pregnancy, antepartum  Took paxlovid for treatment. Repeat growth US at 28 weeks showed normal growth but abd circ >98%ile - recheck US showed measurements within normal limits.     Anxiety  Previously prescribed zoloft, also has hydroxyzine for PRN use.      Reviewed pre- labs: O neg, invitae nml,  boy    Follow up with induction as planned    Dee Davidson MD  [facility]

## 2022-12-19 NOTE — PATIENT INSTRUCTIONS
Phone Numbers:  [facility]: [phone]  [facility]: [phone]     When to call or come in to the hospital  If you notice a decrease in your baby's movement.   If your begin to experience contractions that are 5 minutes or less apart and lasting for over 45 seconds, or if contractions are becoming more painful.  If you have any bleeding or leakage of fluids.   If you have a headache not resolved with tylenol, right upper abdominal pain, or sudden onset of swelling.  You know your body best. Never hesitate to call or go to the hospital if something doesn't feel right!    Preparing for the hospital  You re likely feeling anxious as your child s birth approaches. This is normal. To give yourself some peace of mind, pack a bag 3-4 weeks before your due date. Here is a list of things to remember:  Personal care items like toothbrush, hair brush, lip balm, lotion, shampoo, glasses, contacts  Nightgown, bathrobe, slippers  Several pairs of underwear  Comfortable clothes for you to wear home  Camera with new batteries  Cell phone and   Insurance information and any other paperwork needed for your hospital stay  Clothes for baby to wear home  An infant, rear-facing car seat for bringing home your baby (this is required by law)

## 2022-12-20 ENCOUNTER — MYC MEDICAL ADVICE (OUTPATIENT)
Dept: FAMILY MEDICINE | Facility: CLINIC | Age: 32
End: 2022-12-20

## 2022-12-20 ENCOUNTER — PRENATAL OFFICE VISIT (OUTPATIENT)
Dept: FAMILY MEDICINE | Facility: CLINIC | Age: 32
End: 2022-12-20
Payer: COMMERCIAL

## 2022-12-20 VITALS
DIASTOLIC BLOOD PRESSURE: 66 MMHG | HEART RATE: 93 BPM | TEMPERATURE: 97.3 F | SYSTOLIC BLOOD PRESSURE: 98 MMHG | WEIGHT: 207.7 LBS | OXYGEN SATURATION: 97 % | BODY MASS INDEX: 35.1 KG/M2

## 2022-12-20 DIAGNOSIS — O09.90 PREGNANCY, SUPERVISION, HIGH-RISK, UNSPECIFIED TRIMESTER: Primary | ICD-10-CM

## 2022-12-20 DIAGNOSIS — O26.643 CHOLESTASIS DURING PREGNANCY IN THIRD TRIMESTER: ICD-10-CM

## 2022-12-20 DIAGNOSIS — O98.519 COVID-19 AFFECTING PREGNANCY, ANTEPARTUM: ICD-10-CM

## 2022-12-20 DIAGNOSIS — F41.9 ANXIETY: ICD-10-CM

## 2022-12-20 DIAGNOSIS — U07.1 COVID-19 AFFECTING PREGNANCY, ANTEPARTUM: ICD-10-CM

## 2022-12-20 PROCEDURE — 59426 ANTEPARTUM CARE ONLY: CPT | Performed by: FAMILY MEDICINE

## 2022-12-20 ASSESSMENT — PAIN SCALES - GENERAL: PAINLEVEL: NO PAIN (0)

## 2022-12-20 NOTE — LETTER
December 20, 2022      Marisol Licea  [address] [place] [id number]        To Whom It May Concern:    Marisol Licea is under my medical care. She has developed an unexpected pregnancy condition and will be induced 4 weeks before her due date; please allow freezing of gym membership immediately due to this unforeseen circumstance. I anticipate her account will need to be frozen until beginning of March 2023 but if she is healing faster and feeling well she could resume exercising sooner than that.      Sincerely,        Dee Davidson MD

## 2022-12-22 ENCOUNTER — HOSPITAL ENCOUNTER (OUTPATIENT)
Dept: ULTRASOUND IMAGING | Facility: HOSPITAL | Age: 32
Discharge: HOME | End: 2022-12-22
Attending: FAMILY MEDICINE | Admitting: FAMILY MEDICINE
Payer: COMMERCIAL

## 2022-12-22 ENCOUNTER — PATIENT OUTREACH (OUTPATIENT)
Dept: NURSING | Facility: CLINIC | Age: 32
End: 2022-12-22
Payer: COMMERCIAL

## 2022-12-22 DIAGNOSIS — O26.643 CHOLESTASIS DURING PREGNANCY IN THIRD TRIMESTER: ICD-10-CM

## 2022-12-22 PROCEDURE — 76819 FETAL BIOPHYS PROFIL W/O NST: CPT

## 2022-12-22 SDOH — ECONOMIC STABILITY: TRANSPORTATION INSECURITY
IN THE PAST 12 MONTHS, HAS THE LACK OF TRANSPORTATION KEPT YOU FROM MEDICAL APPOINTMENTS OR FROM GETTING MEDICATIONS?: NO

## 2022-12-22 SDOH — ECONOMIC STABILITY: FOOD INSECURITY: WITHIN THE PAST 12 MONTHS, YOU WORRIED THAT YOUR FOOD WOULD RUN OUT BEFORE YOU GOT MONEY TO BUY MORE.: NEVER TRUE

## 2022-12-22 SDOH — ECONOMIC STABILITY: FOOD INSECURITY: WITHIN THE PAST 12 MONTHS, THE FOOD YOU BOUGHT JUST DIDN'T LAST AND YOU DIDN'T HAVE MONEY TO GET MORE.: NEVER TRUE

## 2022-12-22 SDOH — ECONOMIC STABILITY: INCOME INSECURITY: IN THE LAST 12 MONTHS, WAS THERE A TIME WHEN YOU WERE NOT ABLE TO PAY THE MORTGAGE OR RENT ON TIME?: NO

## 2022-12-22 SDOH — ECONOMIC STABILITY: TRANSPORTATION INSECURITY
IN THE PAST 12 MONTHS, HAS LACK OF TRANSPORTATION KEPT YOU FROM MEETINGS, WORK, OR FROM GETTING THINGS NEEDED FOR DAILY LIVING?: NO

## 2022-12-22 ASSESSMENT — ACTIVITIES OF DAILY LIVING (ADL): DEPENDENT_IADLS:: INDEPENDENT

## 2022-12-22 ASSESSMENT — SOCIAL DETERMINANTS OF HEALTH (SDOH): HOW HARD IS IT FOR YOU TO PAY FOR THE VERY BASICS LIKE FOOD, HOUSING, MEDICAL CARE, AND HEATING?: SOMEWHAT HARD

## 2022-12-22 NOTE — PROGRESS NOTES
Clinic Care Coordination Contact    Clinic Care Coordination Contact  OUTREACH    Referral Information:  Referral Source: PCP    Primary Diagnosis: Psychosocial    Chief Complaint   Patient presents with     Clinic Care Coordination - Initial        Universal Utilization: appropriate  Clinic Utilization  Difficulty keeping appointments:: No  Compliance Concerns: No  No-Show Concerns: No  No PCP office visit in Past Year: No  Utilization    Hospital Admissions  0             ED Visits  0             No Show Count (past year)  2                Current as of: 12/21/2022 10:52 PM              Clinical Concerns:  Current Medical Concerns:  32 yr old, having induction tomorrow for her first child. Her due date was in January, but is having complications.       Current Behavioral Concerns: anxious about finances, and raising a child.     Education Provided to patient: supports.    Pain  Pain (GOAL):: No  Health Maintenance Reviewed:    Clinical Pathway: None    Medication Management:  Medication review status: Medications reviewed and no changes reported per patient.        No concerns.      Functional Status:  Dependent ADLs:: Independent, Ambulation-no assistive device  Dependent IADLs:: Independent  Bed or wheelchair confined:: No  Mobility Status: Independent  Fallen 2 or more times in the past year?: No  Any fall with injury in the past year?: No    Living Situation:  Current living arrangement:: I live alone  Type of residence:: Apartment    Lifestyle & Psychosocial Needs:    Social Determinants of Health     Tobacco Use: Medium Risk     Smoking Tobacco Use: Former     Smokeless Tobacco Use: Never     Passive Exposure: Never   Alcohol Use: Not on file   Financial Resource Strain: Medium Risk     Difficulty of Paying Living Expenses: Somewhat hard   Food Insecurity: No Food Insecurity     Worried About Running Out of Food in the Last Year: Never true     Ran Out of Food in the Last Year: Never true   Transportation  Needs: No Transportation Needs     Lack of Transportation (Medical): No     Lack of Transportation (Non-Medical): No   Physical Activity: Not on file   Stress: Not on file   Social Connections: Not on file   Intimate Partner Violence: Not on file   Depression: Not at risk     PHQ-2 Score: 0   Housing Stability: Low Risk      Unable to Pay for Housing in the Last Year: No     Number of Places Lived in the Last Year: 1     Unstable Housing in the Last Year: No     Diet:: Regular  Inadequate nutrition (GOAL):: No  Tube Feeding: No  Inadequate activity/exercise (GOAL):: No  Significant changes in sleep pattern (GOAL): No  Transportation means:: Regular car     Mandaeism or spiritual beliefs that impact treatment:: No  Mental health DX:: No  Mental health management concern (GOAL):: No  Chemical Dependency Status: No Current Concerns  Informal Support system:: Family     Patient lives alone in an apartment. Never exptected to have children. FOB is not involved. Doesn't have any one to help her.  Her baby may need time in NICU.  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  She is now having trouble with her expenses as her rent is $2,200 and she has rental insurance expenses, no paid paternity leave, poor health insurance coverage, and a car payment.  She is feeling stressed that she would qualify for any supports as her income last year was $96,000.  She hopes to find some financial help if possible.  She had hoped to sign up for a better health insurance during open enrollment that would have covered more of the hospital bills, but now she is having the baby in 2022 with her current insurance.      She did not want to complete the whole assessment due to time and feeling stressed about tomorrow.  Will set up goal at next contact.       Resources and Interventions:  Current Resources:      Community Resources: None  Supplies Currently Used at  Home: None  Equipment Currently Used at Home: none  Employment Status: employed full-time     Advance Care Plan/Directive  Advanced Care Plans/Directives on file:: No  Advanced Care Plan/Directive Status: Declined Further Information    Referrals Placed: None    Care Plan:  will set up goal at next outreach in 5-10 business days.     Patient/Caregiver understanding: will expect a call from writer in 5-10 business days.     Outreach Frequency: monthly  Future Appointments              Today [facility] US 3 M [facility] Ultrasound, [facility]    Tomorrow [facility] L&D PROCEDURE [facility], [facility]    In 6 days Dee Davidson MD [facility], [facility]    In 1 week Dee Davidson MD [facility], [facility]    In 2 weeks Dee Davidson MD [facility]    In 3 weeks Dee Davidson MD [facility]    In 1 month Dee Davidson MD [facility], [facility]          Plan: Patient to work with MEGHAN VALENTINE and will talk with writer in 5-10 days.

## 2022-12-23 PROBLEM — O26.649 CHOLESTASIS DURING PREGNANCY: Status: ACTIVE | Noted: 2022-12-23

## 2022-12-27 ENCOUNTER — ANESTHESIA EVENT (OUTPATIENT)
Dept: OBGYN | Facility: HOSPITAL | Age: 32
End: 2022-12-27
Payer: COMMERCIAL

## 2022-12-27 ENCOUNTER — ANESTHESIA (OUTPATIENT)
Dept: OBGYN | Facility: HOSPITAL | Age: 32
End: 2022-12-27
Payer: COMMERCIAL

## 2022-12-27 PROCEDURE — 250N000011 HC RX IP 250 OP 636: Performed by: OBSTETRICS & GYNECOLOGY

## 2022-12-27 PROCEDURE — 250N000009 HC RX 250: Performed by: ANESTHESIOLOGY

## 2022-12-27 RX ADMIN — Medication 7 ML: at 20:16

## 2022-12-27 RX ADMIN — Medication 2 G: at 19:59

## 2022-12-27 ASSESSMENT — LIFESTYLE VARIABLES: TOBACCO_USE: 0

## 2022-12-28 PROCEDURE — 250N000011 HC RX IP 250 OP 636: Performed by: OBSTETRICS & GYNECOLOGY

## 2022-12-28 PROCEDURE — 258N000003 HC RX IP 258 OP 636: Performed by: OBSTETRICS & GYNECOLOGY

## 2022-12-28 PROCEDURE — 250N000011 HC RX IP 250 OP 636: Performed by: ANESTHESIOLOGY

## 2022-12-28 PROCEDURE — 258N000003 HC RX IP 258 OP 636: Performed by: NURSE ANESTHETIST, CERTIFIED REGISTERED

## 2022-12-28 PROCEDURE — 250N000011 HC RX IP 250 OP 636: Performed by: NURSE ANESTHETIST, CERTIFIED REGISTERED

## 2022-12-28 PROCEDURE — 250N000009 HC RX 250: Performed by: FAMILY MEDICINE

## 2022-12-28 RX ORDER — FENTANYL CITRATE-0.9 % NACL/PF 10 MCG/ML
PLASTIC BAG, INJECTION (ML) INTRAVENOUS PRN
Status: DISCONTINUED | OUTPATIENT
Start: 2022-12-28 | End: 2022-12-28

## 2022-12-28 RX ADMIN — Medication 100 MCG: at 09:29

## 2022-12-28 RX ADMIN — Medication 200 MCG: at 09:11

## 2022-12-28 RX ADMIN — Medication 2 G: at 09:03

## 2022-12-28 RX ADMIN — PHENYLEPHRINE HYDROCHLORIDE 0.5 MCG/KG/MIN: 10 INJECTION INTRAVENOUS at 09:11

## 2022-12-28 RX ADMIN — LIDOCAINE HYDROCHLORIDE 5 ML: 20 INJECTION, SOLUTION EPIDURAL; INFILTRATION; INTRACAUDAL; PERINEURAL at 09:28

## 2022-12-28 RX ADMIN — LIDOCAINE HYDROCHLORIDE 10 ML: 20 INJECTION, SOLUTION EPIDURAL; INFILTRATION; INTRACAUDAL; PERINEURAL at 09:10

## 2022-12-28 RX ADMIN — AZITHROMYCIN 500 MG: 500 INJECTION, POWDER, LYOPHILIZED, FOR SOLUTION INTRAVENOUS at 09:12

## 2022-12-28 RX ADMIN — Medication 2 G: at 09:27

## 2022-12-28 RX ADMIN — Medication 300 ML/HR: at 09:13

## 2022-12-28 NOTE — ANESTHESIA PREPROCEDURE EVALUATION
Anesthesia Pre-Procedure Evaluation    Patient: Marisol Licea   MRN: [id number] : 1990        Procedure :   Cervical Ripening       No past medical history on file.   Past Surgical History:   Procedure Laterality Date     HEMORRHOIDECTOMY        No Known Allergies   Social History     Tobacco Use     Smoking status: Former     Packs/day: 0.25     Types: Cigarettes     Quit date: 2022     Years since quittin.8     Passive exposure: Never     Smokeless tobacco: Never   Substance Use Topics     Alcohol use: Not Currently      Wt Readings from Last 1 Encounters:   22 94.2 kg (207 lb 11.2 oz)        Anesthesia Evaluation   Pt has had prior anesthetic.     No history of anesthetic complications       ROS/MED HX  ENT/Pulmonary:    (-) tobacco use and asthma   Neurologic:       Cardiovascular:    (-) hypertension   METS/Exercise Tolerance:     Hematologic:       Musculoskeletal:       GI/Hepatic:     (+) liver disease,     Renal/Genitourinary:       Endo:    (-) Type II DM and obesity   Psychiatric/Substance Use:       Infectious Disease:       Malignancy:       Other:      (+) Possibly pregnant, ,         Physical Exam    Airway        Mallampati: II   TM distance: > 3 FB   Neck ROM: full   Mouth opening: > 3 cm    Respiratory Devices and Support         Dental     Comment: Good dentition, no loose or removable teeth        Cardiovascular   cardiovascular exam normal       Rhythm and rate: regular and normal     Pulmonary   pulmonary exam normal        breath sounds clear to auscultation       Other findings: 31 yo F G1 PO 36w4d weeks. IOL for cholestasis of pregnancy.     OUTSIDE LABS:  CBC:   Lab Results   Component Value Date    WBC 15.1 (H) 2022    WBC 9.3 2022    HGB 12.4 2022    HGB 11.7 2022    HCT 37.2 2022    HCT 34.5 (L) 2022     2022     2022     BMP:   Lab Results   Component Value Date     2019     POTASSIUM 3.5 01/07/2019    CHLORIDE 105 01/07/2019    CO2 23 01/07/2019    BUN 6 (L) 01/07/2019    CR 0.85 01/07/2019     01/07/2019     COAGS: No results found for: PTT, INR, FIBR  POC:   Lab Results   Component Value Date    HCG Positive (A) 05/24/2022     HEPATIC:   Lab Results   Component Value Date    ALBUMIN 3.7 12/25/2022    PROTTOTAL 6.8 12/25/2022    ALT 11 12/25/2022    AST 16 12/25/2022    ALKPHOS 143 (H) 12/25/2022    BILITOTAL <0.2 12/25/2022     OTHER:   Lab Results   Component Value Date    A1C 5.2 08/08/2017    CLARISSA 9.1 01/07/2019    MAG 1.8 09/07/2022    LIPASE 14 01/07/2019    TSH 0.99 09/07/2022       Anesthesia Plan    ASA Status:  3      Anesthesia Type: Epidural.              Consents    Anesthesia Plan(s) and associated risks, benefits, and realistic alternatives discussed. Questions answered and patient/representative(s) expressed understanding.     - Discussed: Risks, Benefits and Alternatives for the PROCEDURE were discussed     - Discussed with:  Patient         Postoperative Care       PONV prophylaxis: Ondansetron (or other 5HT-3)     Comments:    Other Comments:             Piotr Tierney MD

## 2022-12-28 NOTE — ANESTHESIA CARE TRANSFER NOTE
Patient: Marisol Licea    Procedure: Procedure(s):   SECTION  Cervical Ripening    Diagnosis: Failed medical induction of labor [O61.0]  Diagnosis Additional Information: No value filed.    Anesthesia Type:   Epidural     Note:    Oropharynx: oropharynx clear of all foreign objects and spontaneously breathing  Level of Consciousness: awake  Oxygen Supplementation: room air    Independent Airway: airway patency satisfactory and stable  Dentition: dentition unchanged  Vital Signs Stable: post-procedure vital signs reviewed and stable  Report to RN Given: handoff report given  Patient transferred to: Labor and Delivery    Handoff Report: Identifed the Patient, Identified the Reponsible Provider, Reviewed the pertinent medical history, Discussed the surgical course, Reviewed Intra-OP anesthesia mangement and issues during anesthesia, Set expectations for post-procedure period and Allowed opportunity for questions and acknowledgement of understanding      Vitals:  Vitals Value Taken Time   BP     Temp     Pulse     Resp     SpO2         Electronically Signed By: MILKA Shore CRNA  2022  9:41 AM

## 2022-12-28 NOTE — ANESTHESIA PROCEDURE NOTES
"Epidural catheter Procedure Note    Pre-Procedure   Staff -        Anesthesiologist:  Belle Souza MD       Performed By: anesthesiologist       Location: OB       Procedure Start/Stop Times: 12/27/2022 8:03 PM and 12/27/2022 8:16 PM       Pre-Anesthestic Checklist: patient identified, IV checked, risks and benefits discussed, informed consent, monitors and equipment checked, pre-op evaluation, at physician/surgeon's request and post-op pain management  Timeout:       Correct Patient: Yes        Correct Procedure: Yes        Correct Site: Yes        Correct Position: Yes   Procedure Documentation  Procedure: epidural catheter       Patient Position: sitting       Skin prep: Chloraprep       Local skin infiltrated with 3 mL of 1% lidocaine.        Insertion Site: L3-4. (midline approach).       Technique: LORT saline and LORT air        Needle type: [employer].       Needle Gauge: 18.        Needle Length (Inches): 3.5        Catheter: 20 G.          Catheter threaded easily.             # of attempts: 2 and  # of redirects:     Assessment/Narrative         Paresthesias: No.       Test dose of 3 mL lidocaine 1.5% w/ 1:200,000 epinephrine at 20:12 CST.         Test dose negative, 3 minutes after injection, for signs of intravascular, subdural, or intrathecal injection.       Insertion/Infusion Method: LORT saline and LORT air       Aspiration negative for Heme or CSF via Epidural Catheter.    Medication(s) Administered   0.125% bupivacaine (Epidural) - EPIDURAL   7 mL - 12/27/2022 8:16:00 PM  Medication Administration Time: 12/27/2022 8:03 PM     Comments:  Initial attempt L4-L5 with significant paresthesia. Catheter removed. Successful CARLA and threading without incident at L3-L4. Negative test dose.       FOR [facility] ONLY:   Pain Team Contact information: please page the Pain Team Via YesPlz!. Search \"Pain\". During daytime hours, please page the attending first. At night please page the resident " first.

## 2022-12-29 NOTE — ANESTHESIA POSTPROCEDURE EVALUATION
Patient: Marisol Licea    Procedure: Procedure(s):   SECTION  Cervical Ripening    Anesthesia Type:  Epidural    Note:  Disposition: Inpatient   Postop Pain Control: Uneventful            Sign Out: Well controlled pain   PONV: No   Neuro/Psych: Uneventful            Sign Out: Acceptable/Baseline neuro status   Airway/Respiratory: Uneventful            Sign Out: Acceptable/Baseline resp. status   CV/Hemodynamics: Uneventful            Sign Out: Acceptable CV status; No obvious hypovolemia; No obvious fluid overload   Other NRE: NONE   DID A NON-ROUTINE EVENT OCCUR? No     Epidural-to- Updated ASA: 3      Last vitals:  Vitals:    22 2100 22 0030 22 0337   BP:  111/70 104/61   Pulse:  84 87   Resp:  16 16   Temp:  36.4  C (97.6  F) 36.6  C (97.9  F)   SpO2: (!) 84% 96% 99%       Electronically Signed By: Piotr Tierney MD  2022  6:09 AM

## 2022-12-30 PROBLEM — Z98.891 S/P C-SECTION: Status: ACTIVE | Noted: 2022-12-30

## 2022-12-30 PROBLEM — Z34.90 ENCOUNTER FOR INDUCTION OF LABOR: Status: ACTIVE | Noted: 2022-12-30

## 2022-12-30 PROBLEM — O61.9 FAILED INDUCTION OF LABOR: Status: ACTIVE | Noted: 2022-12-30

## 2022-12-31 ENCOUNTER — MYC MEDICAL ADVICE (OUTPATIENT)
Dept: FAMILY MEDICINE | Facility: CLINIC | Age: 32
End: 2022-12-31

## 2023-01-02 ENCOUNTER — PATIENT OUTREACH (OUTPATIENT)
Dept: CARE COORDINATION | Facility: CLINIC | Age: 33
End: 2023-01-02
Payer: COMMERCIAL

## 2023-01-02 NOTE — TELEPHONE ENCOUNTER
Called patient and constipation in resolved. Bleeding is slowing. Just when she wipes with a bowel movement. Crystal Nina RN on 1/2/2023 at 9:47 AM

## 2023-01-02 NOTE — LETTER
[facility] CARE COORDINATION  [facility]  January 19, 2023    Marisol Licea  [address] [place] [id number]      Dear Marisol,    I have been attempting to reach you since our last contact. I would like to continue to work with you and provide any additional support you may need on achieving your health care related goals. I would appreciate if you would give me a call at [phone] to let me know if you would like to continue working together. I know that there are many things that can affect our ability to communicate and I hope we can continue to work together.    All of us at the [facility] are invested in your health and are here to assist you in meeting your goals.     Sincerely,    HAYDE Paris

## 2023-01-02 NOTE — PROGRESS NOTES
Contact  [facility]/Voicemail    Referral Source: Care Team  Clinical Data:  Outreach  Outreach attempted x 2.  Left message on voicemail with call back information and requested return call. Sent unable to reach letter  Plan:   will send disenrollment letter in 10 business days if no call back..  Social Bre Paris  [facility]  [phone]       Contact  [facility]/Voicemail    Referral Source: Care Team  Clinical Data:  Outreach 1-  Outreach attempted x 1.  Left message on voicemail with call back information and requested return call.  Plan:  will try to reach patient again in 10 business days.  Social Bre Paris  [facility]  [phone]       Contact   Chart Review     Situation: Patient chart reviewed by .    Background: Enrolled patient on 12-22 in care coordination, but did not set up goal as she wanted to wait until after her baby was born.      Assessment: Talked to patient who is home with her son who is doing well. She had to have a C section after several days of induction which failed.  Her mom is with her and the FOB is also assisting. She is very swollen and thinks it is due to many rounds of pitocin.  She is trying best she can to sit with her feet elevated.  She is eating and sleeping ok.  She is breast feeding and working on getting him to latch.  Now she is pumping and bottle feeding.  Baby had virtual appt today and he may have jaundice and she may need to  light.  She has her PCP appt in two days.  She is unsure what to do for insurance for the baby.  Her mental health is good.  Requested to talk again next week about her financial concerns.  Just wants to have more time adjusting to motherhood.      Plan/Recommendations: Will call next week.     Social Bre Paris  [facility]  [phone]

## 2023-01-03 PROBLEM — Z34.00 SUPERVISION OF NORMAL FIRST PREGNANCY, ANTEPARTUM: Status: RESOLVED | Noted: 2022-06-16 | Resolved: 2023-01-03

## 2023-01-03 PROBLEM — O61.9 FAILED INDUCTION OF LABOR: Status: RESOLVED | Noted: 2022-12-30 | Resolved: 2023-01-03

## 2023-01-03 PROBLEM — Z34.90 ENCOUNTER FOR INDUCTION OF LABOR: Status: RESOLVED | Noted: 2022-12-30 | Resolved: 2023-01-03

## 2023-01-04 ENCOUNTER — OFFICE VISIT (OUTPATIENT)
Dept: FAMILY MEDICINE | Facility: CLINIC | Age: 33
End: 2023-01-04
Payer: COMMERCIAL

## 2023-01-04 VITALS
HEIGHT: 64 IN | SYSTOLIC BLOOD PRESSURE: 110 MMHG | BODY MASS INDEX: 32.71 KG/M2 | WEIGHT: 191.58 LBS | RESPIRATION RATE: 20 BRPM | DIASTOLIC BLOOD PRESSURE: 78 MMHG | OXYGEN SATURATION: 98 % | TEMPERATURE: 98.8 F | HEART RATE: 66 BPM

## 2023-01-04 DIAGNOSIS — R22.43 LOCALIZED SWELLING OF BOTH LOWER LEGS: ICD-10-CM

## 2023-01-04 PROCEDURE — 99214 OFFICE O/P EST MOD 30 MIN: CPT | Performed by: FAMILY MEDICINE

## 2023-01-04 ASSESSMENT — PAIN SCALES - GENERAL: PAINLEVEL: MILD PAIN (2)

## 2023-01-04 NOTE — PROGRESS NOTES
Assessment/Plan:    Routine postpartum follow-up  Localized swelling of both lower legs  Postpartum check today. Overall doing well - answered pt's questions; she does have bilateral leg swelling but BP normal, suspect related to IVF during long induction - discussed management. Removed bandage on c/s incision today as it has been on for 1 week.     Follow up: 4-6 weeks for postpartum visit, sooner as needed    Total time: 40 mins    Dee Davidson MD  [facility]    Subjective:   Marisol Licea is a 32 year old female is here today for postpartum check    -failed 5 day induction, delivered via  on  - G1, induction for cholestasis  -very swollen in legs - rest of body swelling improving   -had questions today: clogged duct management, feeding, some baby related questions      Patient Active Problem List   Diagnosis     ACL tear     PCOS (polycystic ovarian syndrome)     Hemorrhoids, unspecified hemorrhoid type     Cholestasis during pregnancy     S/P      Past Medical History:   Diagnosis Date     Encounter for induction of labor 2022     Failed induction of labor 2022     S/P  2022     Past Surgical History:   Procedure Laterality Date      SECTION N/A 2022    Procedure:  SECTION;  Surgeon: Essence Perkins MD;  Location: [facility] OR     HEMORRHOIDECTOMY       Current Outpatient Medications   Medication     ferrous sulfate (FEROSUL) 325 (65 Fe) MG tablet     hydrOXYzine (ATARAX) 25 MG tablet     ibuprofen (ADVIL/MOTRIN) 600 MG tablet     polyethylene glycol (MIRALAX) 17 g packet     Prenatal Vit-Fe Fumarate-FA (PRENATAL PO)     ondansetron (ZOFRAN ODT) 4 MG ODT tab     No current facility-administered medications for this visit.     No Known Allergies  Social History     Socioeconomic History     Marital status: Single     Spouse name: Not on file     Number of children: Not on file     Years of education: Not  on file     Highest education level: Not on file   Occupational History     Not on file   Tobacco Use     Smoking status: Former     Packs/day: 0.25     Types: Cigarettes     Quit date: 2022     Years since quittin.8     Passive exposure: Never     Smokeless tobacco: Never   Vaping Use     Vaping Use: Never used   Substance and Sexual Activity     Alcohol use: Not Currently     Drug use: Not Currently     Sexual activity: Yes     Partners: Male   Other Topics Concern     Not on file   Social History Narrative     Not on file     Social Determinants of Health     Financial Resource Strain: Medium Risk     Difficulty of Paying Living Expenses: Somewhat hard   Food Insecurity: No Food Insecurity     Worried About Running Out of Food in the Last Year: Never true     Ran Out of Food in the Last Year: Never true   Transportation Needs: No Transportation Needs     Lack of Transportation (Medical): No     Lack of Transportation (Non-Medical): No   Physical Activity: Not on file   Stress: Not on file   Social Connections: Not on file   Intimate Partner Violence: Not on file   Housing Stability: Low Risk      Unable to Pay for Housing in the Last Year: No     Number of Places Lived in the Last Year: 1     Unstable Housing in the Last Year: No     Family History   Problem Relation Age of Onset     No Known Problems Mother      No Known Problems Father      No Known Problems Sister      No Known Problems Brother      Chronic Obstructive Pulmonary Disease Maternal Grandmother      Chronic Obstructive Pulmonary Disease Maternal Grandfather      Other - See Comments Maternal Grandfather         heart condition     Cancer Paternal Grandmother      Myocardial Infarction Paternal Grandfather      Review of systems is as stated in HPI, and the remainder of system review is otherwise negative.    Objective:     /78 (BP Location: Left arm, Patient Position: Sitting, Cuff Size: Adult Regular)   Pulse 66   Temp 98.8  F  "(37.1  C) (Tympanic)   Resp 20   Ht 1.638 m (5' 4.49\")   Wt 86.9 kg (191 lb 9.3 oz)   LMP 04/15/2022   SpO2 98%   Breastfeeding Yes   BMI 32.39 kg/m      General appearance: awake, NAD, here with FOB and baby  HEENT: atraumatic, normocephalic  Lungs: breathing comfortably on room air  Extremities: LE edema bilaterally, moving all extremities, strong peripheral pulses bilaterally  Skin: no rashes or lesions  Neuro: alert, oriented x3, CNs grossly intact, no focal deficits appreciated  Psych: normal mood/affect/behavior, answering questions appropriately, linear thought process    "

## 2023-01-04 NOTE — PATIENT INSTRUCTIONS
clotrimazole antifungal over the counter cream - apply to diaper rash twice per day  -put desitin over it/with every diaper  -ok to stop vaseline on penis

## 2023-01-15 ENCOUNTER — MYC MEDICAL ADVICE (OUTPATIENT)
Dept: FAMILY MEDICINE | Facility: CLINIC | Age: 33
End: 2023-01-15

## 2023-01-17 ENCOUNTER — MYC MEDICAL ADVICE (OUTPATIENT)
Dept: FAMILY MEDICINE | Facility: CLINIC | Age: 33
End: 2023-01-17
Payer: COMMERCIAL

## 2023-01-27 ENCOUNTER — PATIENT OUTREACH (OUTPATIENT)
Dept: CARE COORDINATION | Facility: CLINIC | Age: 33
End: 2023-01-27

## 2023-01-27 NOTE — LETTER
[facility] CARE COORDINATION  [facility]  January 27, 2023    Marisol Licea  [address] [place] [id number]      Dear Marisol,    I have been unsuccessful in reaching you since our last contact. At this time the Care Coordination team will make no further attempts to reach you, however this does not change your ability to continue receiving care from your providers at your primary care clinic. If you need additional support from a care coordinator in the future please contact Katlyn at [phone].    All of us at [facility] are invested in your health and are here to assist you in meeting your goals.     Sincerely,    Katlyn Lancaster,   [facility]  [phone]

## 2023-01-27 NOTE — PROGRESS NOTES
Contact  [facility]/Voicemail    Referral Source: Care Team  Clinical Data:  Outreach  No call back after leaving two messages.   Plan:  will send disenrollment letter with care coordinator contact information.  will do no further outreaches at this time.  Katlyn Lancaster,   [facility]  [phone]

## 2023-02-10 ENCOUNTER — OFFICE VISIT (OUTPATIENT)
Dept: FAMILY MEDICINE | Facility: CLINIC | Age: 33
End: 2023-02-10
Payer: COMMERCIAL

## 2023-02-10 ENCOUNTER — NURSE TRIAGE (OUTPATIENT)
Dept: FAMILY MEDICINE | Facility: CLINIC | Age: 33
End: 2023-02-10
Payer: COMMERCIAL

## 2023-02-10 VITALS
RESPIRATION RATE: 15 BRPM | HEART RATE: 70 BPM | OXYGEN SATURATION: 99 % | DIASTOLIC BLOOD PRESSURE: 84 MMHG | TEMPERATURE: 98.3 F | SYSTOLIC BLOOD PRESSURE: 123 MMHG

## 2023-02-10 DIAGNOSIS — N93.9 VAGINAL BLEEDING: ICD-10-CM

## 2023-02-10 DIAGNOSIS — R10.11 RUQ ABDOMINAL PAIN: Primary | ICD-10-CM

## 2023-02-10 DIAGNOSIS — L73.9 FOLLICULITIS: ICD-10-CM

## 2023-02-10 LAB
ALBUMIN SERPL BCG-MCNC: 4.5 G/DL (ref 3.5–5.2)
ALBUMIN UR-MCNC: NEGATIVE MG/DL
ALP SERPL-CCNC: 63 U/L (ref 35–104)
ALT SERPL W P-5'-P-CCNC: 20 U/L (ref 10–35)
ANION GAP SERPL CALCULATED.3IONS-SCNC: 14 MMOL/L (ref 7–15)
APPEARANCE UR: CLEAR
AST SERPL W P-5'-P-CCNC: 21 U/L (ref 10–35)
BACTERIA #/AREA URNS HPF: ABNORMAL /HPF
BASOPHILS # BLD AUTO: 0 10E3/UL (ref 0–0.2)
BASOPHILS NFR BLD AUTO: 0 %
BILIRUB SERPL-MCNC: 0.2 MG/DL
BILIRUB UR QL STRIP: NEGATIVE
BUN SERPL-MCNC: 9.1 MG/DL (ref 6–20)
CALCIUM SERPL-MCNC: 9.4 MG/DL (ref 8.6–10)
CHLORIDE SERPL-SCNC: 102 MMOL/L (ref 98–107)
COLOR UR AUTO: YELLOW
CREAT SERPL-MCNC: 0.88 MG/DL (ref 0.51–0.95)
DEPRECATED HCO3 PLAS-SCNC: 22 MMOL/L (ref 22–29)
EOSINOPHIL # BLD AUTO: 0.1 10E3/UL (ref 0–0.7)
EOSINOPHIL NFR BLD AUTO: 2 %
ERYTHROCYTE [DISTWIDTH] IN BLOOD BY AUTOMATED COUNT: 11.5 % (ref 10–15)
GFR SERPL CREATININE-BSD FRML MDRD: 90 ML/MIN/1.73M2
GLUCOSE SERPL-MCNC: 92 MG/DL (ref 70–99)
GLUCOSE UR STRIP-MCNC: NEGATIVE MG/DL
HCT VFR BLD AUTO: 38 % (ref 35–47)
HGB BLD-MCNC: 12.8 G/DL (ref 11.7–15.7)
HGB UR QL STRIP: ABNORMAL
IMM GRANULOCYTES # BLD: 0 10E3/UL
IMM GRANULOCYTES NFR BLD: 0 %
KETONES UR STRIP-MCNC: NEGATIVE MG/DL
LEUKOCYTE ESTERASE UR QL STRIP: NEGATIVE
LYMPHOCYTES # BLD AUTO: 1.4 10E3/UL (ref 0.8–5.3)
LYMPHOCYTES NFR BLD AUTO: 30 %
MCH RBC QN AUTO: 29.6 PG (ref 26.5–33)
MCHC RBC AUTO-ENTMCNC: 33.7 G/DL (ref 31.5–36.5)
MCV RBC AUTO: 88 FL (ref 78–100)
MONOCYTES # BLD AUTO: 0.2 10E3/UL (ref 0–1.3)
MONOCYTES NFR BLD AUTO: 5 %
NEUTROPHILS # BLD AUTO: 3 10E3/UL (ref 1.6–8.3)
NEUTROPHILS NFR BLD AUTO: 63 %
NITRATE UR QL: NEGATIVE
PH UR STRIP: 7 [PH] (ref 5–8)
PLATELET # BLD AUTO: 298 10E3/UL (ref 150–450)
POTASSIUM SERPL-SCNC: 4.2 MMOL/L (ref 3.4–5.3)
PROT SERPL-MCNC: 7.4 G/DL (ref 6.4–8.3)
RBC # BLD AUTO: 4.32 10E6/UL (ref 3.8–5.2)
RBC #/AREA URNS AUTO: ABNORMAL /HPF
SODIUM SERPL-SCNC: 138 MMOL/L (ref 136–145)
SP GR UR STRIP: 1.01 (ref 1–1.03)
SQUAMOUS #/AREA URNS AUTO: ABNORMAL /LPF
UROBILINOGEN UR STRIP-ACNC: 0.2 E.U./DL
WBC # BLD AUTO: 4.8 10E3/UL (ref 4–11)
WBC #/AREA URNS AUTO: ABNORMAL /HPF

## 2023-02-10 PROCEDURE — 99215 OFFICE O/P EST HI 40 MIN: CPT | Performed by: PHYSICIAN ASSISTANT

## 2023-02-10 PROCEDURE — 80053 COMPREHEN METABOLIC PANEL: CPT | Performed by: PHYSICIAN ASSISTANT

## 2023-02-10 PROCEDURE — 81001 URINALYSIS AUTO W/SCOPE: CPT | Performed by: PHYSICIAN ASSISTANT

## 2023-02-10 PROCEDURE — 36415 COLL VENOUS BLD VENIPUNCTURE: CPT | Performed by: PHYSICIAN ASSISTANT

## 2023-02-10 PROCEDURE — 85025 COMPLETE CBC W/AUTO DIFF WBC: CPT | Performed by: PHYSICIAN ASSISTANT

## 2023-02-10 RX ORDER — CEPHALEXIN 500 MG/1
500 CAPSULE ORAL 3 TIMES DAILY
Qty: 30 CAPSULE | Refills: 0 | Status: SHIPPED | OUTPATIENT
Start: 2023-02-10 | End: 2023-02-20

## 2023-02-10 RX ORDER — ACETAMINOPHEN 500 MG
500-1000 TABLET ORAL EVERY 6 HOURS PRN
COMMUNITY
End: 2023-05-10

## 2023-02-10 NOTE — TELEPHONE ENCOUNTER
Nurse Triage SBAR    Is this a 2nd Level Triage? NO    Situation:   Patient stated she is 6 weeks postpartum and started bleeding again today.  Patient stated she has been having abdominal pain for the past two week and it has worsened in the last few days.    Background:   6 weeks postpartum with  delivery.    Assessment:   Complained of right side abdominal pain in the center above her surgical site for 2 weeks now, but worsened the last few days.   Dull constant pain during the day then later in the day, she can pin point exactly where the pain is.  Pain is worst at night and laughing makes it worst.  No injury to abdomen or vagina recently.    Patient stated that vaginal bleeding stopped 2 weeks ago and she started bleeding again today-light bleeding.      Denied fever, but stated that now that she thinks about it, she has been getting more sweaty the last 3 nights.     Protocol Recommended Disposition:   Go To Office Now, See More Appropriate Protocol    Recommendation:   Care advices given. No clinic appts available for remainder of today. Patient advised to be seen today at [facility] or ED. Patient stated she will go to the [facility] today.     Not routing to provider- pt will go in to be seen today at [facility]    Does the patient meet one of the following criteria for ADS visit consideration? 16+ years old, with an [facility] PCP     TIP  Providers, please consider if this condition is appropriate for management at one of our Acute and Diagnostic Services sites.     If patient is a good candidate, please use dotphrase <dot>triageresponse and select Refer to ADS to document.    Reason for Disposition    Postpartum (from 0 to 6 weeks after delivery)    Constant abdominal pain and present > 2 hours    Additional Information    Negative: SEVERE vaginal bleeding (e.g., continuous red blood from vagina, or large blood clots) and very weak (can't stand)    Negative: Passed out (i.e., fainted, collapsed and was not  responding)    Negative: Difficult to awaken or acting confused (e.g., disoriented, slurred speech)    Negative: Shock suspected (e.g., cold/pale/clammy skin, too weak to stand, low BP, rapid pulse)    Negative: Sounds like a life-threatening emergency to the triager    Negative: Pregnant 20 or more weeks (5 months or more)    Negative: Pregnant < 20 weeks (less than 5 months)    Negative: Shock suspected (e.g., cold/pale/clammy skin, too weak to stand, low BP, rapid pulse)    Negative: Difficult to awaken or acting confused (e.g., disoriented, slurred speech)    Negative: Passed out (i.e., fainted, collapsed and was not responding)    Negative: Sounds like a life-threatening emergency to the triager    Negative: SEVERE dizziness (e.g., unable to stand, requires support to walk, feels like passing out now)    Negative: SEVERE abdominal pain and present > 1 hour    Negative: Fever > 100.4 F (38.0 C)    Negative: SEVERE vaginal bleeding (e.g., soaking 2 pads or tampons per hour and present 2 or more hours; 1 menstrual cup every 2 hours)    Negative: Patient sounds very sick or weak to the triager    Negative: MODERATE vaginal bleeding (e.g., soaking 1 pad or tampon per hour and present > 6 hours; 1 menstrual cup every 6 hours)    Protocols used: VAGINAL BLEEDING - [id number], POSTPARTUM - VAGINAL BLEEDING AND LOCHIA-A-OH    TONY CornellN, RN  [facility]

## 2023-02-10 NOTE — PROGRESS NOTES
Assessment & Plan:      Problem List Items Addressed This Visit    None  Visit Diagnoses     RUQ abdominal pain    -  Primary    Relevant Orders    Comprehensive metabolic panel (BMP + Alb, Alk Phos, ALT, AST, Total. Bili, TP)    CBC with platelets and differential (Completed)    UA macro with reflex to Microscopic and Culture - Clinc Collect (Completed)    Urine Microscopic (Completed)    Folliculitis        Relevant Medications    cephALEXin (KEFLEX) 500 MG capsule    Vaginal bleeding            Medical Decision Making  Patient with recent  section performed 6 weeks ago presents with right upper quadrant abdominal pains and rash for 3 weeks with no new vaginal bleeding starting today.  Uncertain of cause of right upper quadrant abdominal pains.  No specific point tenderness on abdominal exam to make concerns for cholecystitis low.  CBC shows normal white blood cell count and no signs of anemia.  Urinalysis does show trace hematuria which is likely contaminant from the vaginal bleeding.  Otherwise, no signs UTI truly concern for pyelonephritis.  Uncertain of cause of vaginal bleeding.  This could be a return of patient's menstrual cycle versus bleeding from previous procedure 6 weeks ago.  CBC shows normal hemoglobin to rule out signs of worse bleeding.  Did offer abdominal ultrasound at this time, however patient platelet declined due to concerns for cost.  There is in process CMP to rule out signs of hepatitis versus acute kidney injury versus signs of hepatobiliary duct blockage.  Will contact patient only if lab is irregular and requires further follow-up.  Patient's skin rash appears consistent with folliculitis versus hormone changes following recent pregnancy.  Recommend trial of oral antibiotics at this time.  Provided education materials on folliculitis.  Discussed treatment and symptomatic care.  Allergies and medication interactions reviewed.  Discussed signs of worsening symptoms and when to  follow-up with PCP if no symptom improvement.    40 minutes spent in total for charting, chart review, patient examination, and discussion with patient on labs, counseling,and coordination of care as listed above.     Subjective:      Marisol Licea is a 32 year old female here for evaluation of right upper quadrant abdominal pains, rash, and vaginal bleeding.  Abdominal pains and rash have been present for 3 weeks.  Rash has been worsening and is located on the chest and the left upper extremity.  Patient also recently had  section 6 weeks ago.  Her infant son has similar rash.  Patient was seen a few weeks ago and was told this was due to the hormone changes, however rash is itchy and continues to spread.  No fevers.  Patient also notes right flank/right upper quadrant abdominal pains that worsen when she laughs, coughs, or sneezes.  Patient denies vaginal pains.  She does have history of polycystic ovarian syndrome.  Patient is breast-feeding about once a day and then pumps several times a day.     The following portions of the patient's history were reviewed and updated as appropriate: allergies, current medications, and problem list.     Review of Systems  Pertinent items are noted in HPI.    Allergies  No Known Allergies    Family History   Problem Relation Age of Onset     No Known Problems Mother      No Known Problems Father      No Known Problems Sister      No Known Problems Brother      Chronic Obstructive Pulmonary Disease Maternal Grandmother      Chronic Obstructive Pulmonary Disease Maternal Grandfather      Other - See Comments Maternal Grandfather         heart condition     Cancer Paternal Grandmother      Myocardial Infarction Paternal Grandfather        Social History     Tobacco Use     Smoking status: Former     Packs/day: 0.25     Types: Cigarettes     Quit date: 2022     Years since quittin.9     Passive exposure: Never     Smokeless tobacco: Never   Substance Use  Topics     Alcohol use: Not Currently        Objective:      /84   Pulse 70   Temp 98.3  F (36.8  C)   Resp 15   LMP 04/15/2022   SpO2 99%   Breastfeeding Yes   General appearance - alert, well appearing, and in no distress and non-toxic  Chest - clear to auscultation, no wheezes, rales or rhonchi, symmetric air entry  Heart - normal rate, regular rhythm, normal S1, S2, no murmurs, rubs, clicks or gallops  Abdomen - Initial tenderness in the epigastric region with pain that shot down into the lower abdomen, palpated again in this region and there were no pains, no other tenderness throughout the abdomen, abdomen soft, no masses organomegaly, normal bowel sounds  Back exam - No CVA tenderness  Skin - Erythematous pustules affecting the anterior chest, fingers, and left neck     Lab & Imaging Results    Results for orders placed or performed in visit on 02/10/23   UA macro with reflex to Microscopic and Culture - Clinc Collect     Status: Abnormal    Specimen: Urine, Clean Catch   Result Value Ref Range    Color Urine Yellow Colorless, Straw, Light Yellow, Yellow    Appearance Urine Clear Clear    Glucose Urine Negative Negative mg/dL    Bilirubin Urine Negative Negative    Ketones Urine Negative Negative mg/dL    Specific Gravity Urine 1.015 1.005 - 1.030    Blood Urine Moderate (A) Negative    pH Urine 7.0 5.0 - 8.0    Protein Albumin Urine Negative Negative mg/dL    Urobilinogen Urine 0.2 0.2, 1.0 E.U./dL    Nitrite Urine Negative Negative    Leukocyte Esterase Urine Negative Negative   CBC with platelets and differential     Status: None   Result Value Ref Range    WBC Count 4.8 4.0 - 11.0 10e3/uL    RBC Count 4.32 3.80 - 5.20 10e6/uL    Hemoglobin 12.8 11.7 - 15.7 g/dL    Hematocrit 38.0 35.0 - 47.0 %    MCV 88 78 - 100 fL    MCH 29.6 26.5 - 33.0 pg    MCHC 33.7 31.5 - 36.5 g/dL    RDW 11.5 10.0 - 15.0 %    Platelet Count 298 150 - 450 10e3/uL    % Neutrophils 63 %    % Lymphocytes 30 %    % Monocytes  5 %    % Eosinophils 2 %    % Basophils 0 %    % Immature Granulocytes 0 %    Absolute Neutrophils 3.0 1.6 - 8.3 10e3/uL    Absolute Lymphocytes 1.4 0.8 - 5.3 10e3/uL    Absolute Monocytes 0.2 0.0 - 1.3 10e3/uL    Absolute Eosinophils 0.1 0.0 - 0.7 10e3/uL    Absolute Basophils 0.0 0.0 - 0.2 10e3/uL    Absolute Immature Granulocytes 0.0 <=0.4 10e3/uL   Urine Microscopic     Status: Abnormal   Result Value Ref Range    Bacteria Urine None Seen None Seen /HPF    RBC Urine 2-5 (A) 0-2 /HPF /HPF    WBC Urine None Seen 0-5 /HPF /HPF    Squamous Epithelials Urine None Seen None Seen /LPF    Narrative    Urine Culture not indicated   CBC with platelets and differential     Status: None    Narrative    The following orders were created for panel order CBC with platelets and differential.  Procedure                               Abnormality         Status                     ---------                               -----------         ------                     CBC with platelets and d...[[id number]]                      Final result                 Please view results for these tests on the individual orders.       I personally reviewed these results and discussed findings with the patient.    The use of Dragon/[employer] dictation services was used to construct the content of this note; any grammatical errors are non-intentional. Please contact the author directly if you are in need of any clarification.

## 2023-02-11 ENCOUNTER — HEALTH MAINTENANCE LETTER (OUTPATIENT)
Age: 33
End: 2023-02-11

## 2023-02-13 ENCOUNTER — MYC MEDICAL ADVICE (OUTPATIENT)
Dept: FAMILY MEDICINE | Facility: CLINIC | Age: 33
End: 2023-02-13
Payer: COMMERCIAL

## 2023-02-20 ENCOUNTER — TELEPHONE (OUTPATIENT)
Dept: FAMILY MEDICINE | Facility: CLINIC | Age: 33
End: 2023-02-20

## 2023-02-20 ENCOUNTER — OFFICE VISIT (OUTPATIENT)
Dept: FAMILY MEDICINE | Facility: CLINIC | Age: 33
End: 2023-02-20
Payer: COMMERCIAL

## 2023-02-20 VITALS
SYSTOLIC BLOOD PRESSURE: 106 MMHG | OXYGEN SATURATION: 97 % | DIASTOLIC BLOOD PRESSURE: 74 MMHG | HEART RATE: 64 BPM | TEMPERATURE: 97.7 F | RESPIRATION RATE: 16 BRPM

## 2023-02-20 DIAGNOSIS — K42.9 UMBILICAL HERNIA WITHOUT OBSTRUCTION AND WITHOUT GANGRENE: Primary | ICD-10-CM

## 2023-02-20 DIAGNOSIS — Z98.891 S/P C-SECTION: ICD-10-CM

## 2023-02-20 PROCEDURE — 99214 OFFICE O/P EST MOD 30 MIN: CPT | Performed by: PHYSICIAN ASSISTANT

## 2023-02-20 NOTE — PATIENT INSTRUCTIONS
Follow up with general surgery for further disucssion of this umbilical hernia.   You may take Tylenol as needed for pain control.   Avoid heavy lifting or stretching out the abdomen.   Seek emergency medical attention if you develop vomiting, hard belly button, red belly button or fevers and worse abdominal pain.   Walking on the treadmill is ok, swimming is fine as long as it doesn't worsen pain, elliptical would be fine.

## 2023-02-20 NOTE — PROGRESS NOTES
Patient presents with:  Abdominal Pain: Pain in abdominal pain and bulge or bump on bellybutton      Clinical Decision Making:  Patient now has umbilical hernia causing abdominal discomfort 7 weeks status post  section.   wound is healing well.  Patient has not had any symptoms concerning for systemic infection.  We are unable to do any imaging at this time of evening at the urgent care, but physical exam exhibits soft nonincarcerated umbilical hernia that has grown over the past 10 days per patient.  Recommend follow-up with general surgery for discussion of umbilical hernia repair.  Referral made.      ICD-10-CM    1. Umbilical hernia without obstruction and without gangrene  K42.9 Adult General Surg Referral      2. S/P   Z98.891           Patient Instructions   1. Follow up with general surgery for further disucssion of this umbilical hernia.   2. You may take Tylenol as needed for pain control.   3. Avoid heavy lifting or stretching out the abdomen.   4. Seek emergency medical attention if you develop vomiting, hard belly button, red belly button or fevers and worse abdominal pain.   5. Walking on the treadmill is ok, swimming is fine as long as it doesn't worsen pain, elliptical would be fine.       HPI:  Marisol Licea is a 32 year old female who presents today complaining of abdominal pain to the right of her bellybutton.  And possible hernia through the umbilicus.  Patient had  7-8 weeks ago.  Patient was previously seen in the urgent care clinic for this  concern on 2/10/2023.  At that time patient had normal CBC, normal CMP, fairly normal UA.  No imaging was done at the time.  Patient was started on Keflex 3 times daily for possible folliculitis.  At the time of the visit ultrasound was offered, but declined by the patient due to concern of cost. Her abdominal pain worsens with laughing or sneezing. No fever. Patient still wears belly band most of the time for  comfort.     History obtained from the patient.    Problem List:  2022: Encounter for induction of labor  2022: Failed induction of labor  2022: S/P   2022: Cholestasis during pregnancy  2022: PCOS (polycystic ovarian syndrome)  2022: Hemorrhoids, unspecified hemorrhoid type  2022: Supervision of normal first pregnancy, antepartum  2009: ACL tear      Past Medical History:   Diagnosis Date     Encounter for induction of labor 2022     Failed induction of labor 2022     S/P  2022       Social History     Tobacco Use     Smoking status: Former     Packs/day: 0.25     Types: Cigarettes     Quit date: 2022     Years since quittin.9     Passive exposure: Never     Smokeless tobacco: Never   Substance Use Topics     Alcohol use: Not Currently       Review of Systems    Vitals:    23 1644   BP: 106/74   Pulse: 64   Resp: 16   Temp: 97.7  F (36.5  C)   TempSrc: Oral   SpO2: 97%       Physical Exam  Vitals and nursing note reviewed.   Constitutional:       General: She is not in acute distress.     Appearance: She is not toxic-appearing or diaphoretic.   HENT:      Head: Normocephalic and atraumatic.      Right Ear: External ear normal.      Left Ear: External ear normal.   Eyes:      Conjunctiva/sclera: Conjunctivae normal.   Pulmonary:      Effort: Pulmonary effort is normal. No respiratory distress.   Abdominal:      General: Abdomen is flat. There is no distension.      Palpations: Abdomen is soft.      Tenderness: There is abdominal tenderness. There is no right CVA tenderness or guarding.      Hernia: A hernia is present. Hernia is present in the umbilical area.       Neurological:      Mental Status: She is alert.   Psychiatric:         Mood and Affect: Mood normal.         Behavior: Behavior normal.         Thought Content: Thought content normal.         Judgment: Judgment normal.           At the end of the encounter, I discussed results,  diagnosis, medications. Discussed red flags for immediate return to clinic/ER, as well as indications for follow up if no improvement. Patient understood and agreed to plan. Patient was stable for discharge.

## 2023-02-20 NOTE — TELEPHONE ENCOUNTER
Pt is calling with post partum concerns. Pt states that she has a lot of pain still even with being 8 weeks out.     Pt states that there was an issue with her appointment last time and she sat in the waiting room for 30 mins without being seen and then was marked as a no show.     Pt states that she should not have to wait this long to be seen again. Patient states that she has pain to the right of her belly button and just below its very tender to the touch still. Pt states she still wears the compression shorts/briefs and it hasn't been helping. She also mentioned that her belly button is starting to look like a hernia.     Please call patient and advise regarding sooner appointment and px.

## 2023-02-21 NOTE — TELEPHONE ENCOUNTER
Appears she was seen yesterday and hernia found. Referral to general surgery was made    Adrian Brooke MD

## 2023-02-23 NOTE — H&P (VIEW-ONLY)
Consultation from Dr. Davidson about an Umbilical Hernia    HPI:  This is a 32 year old female here today with concerns of a bulge at the umbilicus.  This swollen area is painful. She has noted this for the past 4 weeks.   She is just status post the delivery of a baby boy by  8 weeks ago.  She is really uncomfortable with this hernia and would like if fixed soon.       Allergies:Patient has no known allergies.    Past Medical History:   Diagnosis Date     Encounter for induction of labor 2022     Failed induction of labor 2022     S/P  2022       Past Surgical History:   Procedure Laterality Date      SECTION N/A 2022    Procedure:  SECTION;  Surgeon: Essence Perkins MD;  Location: [facility] OR     HEMORRHOIDECTOMY         CURRENT MEDS:  Current Outpatient Medications   Medication Sig Dispense Refill     acetaminophen (TYLENOL) 500 MG tablet Take 500-1,000 mg by mouth every 6 hours as needed for mild pain       ferrous sulfate (FEROSUL) 325 (65 Fe) MG tablet Take 325 mg by mouth daily (with breakfast) (Patient not taking: Reported on 2/10/2023)       hydrOXYzine (ATARAX) 25 MG tablet Take 1-2 tablets (25-50 mg) by mouth nightly as needed (insomnia, sleep issues) (Patient not taking: Reported on 2/10/2023) 90 tablet 1     ibuprofen (ADVIL/MOTRIN) 600 MG tablet Take 1 tablet (600 mg) by mouth every 6 hours as needed (Patient not taking: Reported on 2/10/2023) 40 tablet 0     polyethylene glycol (MIRALAX) 17 g packet Take 1 packet by mouth daily (Patient not taking: Reported on 2/10/2023)       Prenatal Vit-Fe Fumarate-FA (PRENATAL PO)  (Patient not taking: Reported on 2/10/2023)         Family History   Problem Relation Age of Onset     No Known Problems Mother      No Known Problems Father      No Known Problems Sister      No Known Problems Brother      Chronic Obstructive Pulmonary Disease Maternal Grandmother      Chronic Obstructive Pulmonary  "Disease Maternal Grandfather      Other - See Comments Maternal Grandfather         heart condition     Cancer Paternal Grandmother      Myocardial Infarction Paternal Grandfather         reports that she quit smoking about a year ago. Her smoking use included cigarettes. She smoked an average of .25 packs per day. She has never been exposed to tobacco smoke. She has never used smokeless tobacco. She reports that she does not currently use alcohol. She reports that she does not currently use drugs.    Review of Systems -   10 point Review of systems is negative except for; as mentioned above in HPI and PMHx    /80   Ht 1.626 m (5' 4\")   Wt 83.5 kg (184 lb)   LMP 04/15/2022   BMI 31.58 kg/m    EXAM:  GENERAL: Well developed female  HEENT: EOMI, Anicteric Sclera  NECK:  No masses, good flexion and extention of the neck  CARDIAC: RRR w/out murmur   CHEST/LUNG: Clear  ABDOMEN: Umbilical hernia which is not reducible.  The umbilicus is tender to palpation  NEURO: She is ambulatory with good strength in both legs.    IMAGES: none    Assessment/Plan: Pt with an umbilical hernia. I discussed this at length with her.  I went over conservative management as well as surgical treatment for hernias.   I would reccomend a laparoscopic umbilical hernia repair, understanding the possibility of converting to an open operation.   I went over the small risks of surgery including but not limited to bleeding and infection. I discussed the expected recovery time as well. We will schedule this hernia repair at his earliest convenience.    Sahil Méndez MD  [facility]; [facility]  [phone]  "

## 2023-02-23 NOTE — PROGRESS NOTES
Consultation from Dr. Davidson about an Umbilical Hernia    HPI:  This is a 32 year old female here today with concerns of a bulge at the umbilicus.  This swollen area is painful. She has noted this for the past 4 weeks.   She is just status post the delivery of a baby boy by  8 weeks ago.  She is really uncomfortable with this hernia and would like if fixed soon.       Allergies:Patient has no known allergies.    Past Medical History:   Diagnosis Date     Encounter for induction of labor 2022     Failed induction of labor 2022     S/P  2022       Past Surgical History:   Procedure Laterality Date      SECTION N/A 2022    Procedure:  SECTION;  Surgeon: Essence Perkins MD;  Location: [facility] OR     HEMORRHOIDECTOMY         CURRENT MEDS:  Current Outpatient Medications   Medication Sig Dispense Refill     acetaminophen (TYLENOL) 500 MG tablet Take 500-1,000 mg by mouth every 6 hours as needed for mild pain       ferrous sulfate (FEROSUL) 325 (65 Fe) MG tablet Take 325 mg by mouth daily (with breakfast) (Patient not taking: Reported on 2/10/2023)       hydrOXYzine (ATARAX) 25 MG tablet Take 1-2 tablets (25-50 mg) by mouth nightly as needed (insomnia, sleep issues) (Patient not taking: Reported on 2/10/2023) 90 tablet 1     ibuprofen (ADVIL/MOTRIN) 600 MG tablet Take 1 tablet (600 mg) by mouth every 6 hours as needed (Patient not taking: Reported on 2/10/2023) 40 tablet 0     polyethylene glycol (MIRALAX) 17 g packet Take 1 packet by mouth daily (Patient not taking: Reported on 2/10/2023)       Prenatal Vit-Fe Fumarate-FA (PRENATAL PO)  (Patient not taking: Reported on 2/10/2023)         Family History   Problem Relation Age of Onset     No Known Problems Mother      No Known Problems Father      No Known Problems Sister      No Known Problems Brother      Chronic Obstructive Pulmonary Disease Maternal Grandmother      Chronic Obstructive Pulmonary  "Disease Maternal Grandfather      Other - See Comments Maternal Grandfather         heart condition     Cancer Paternal Grandmother      Myocardial Infarction Paternal Grandfather         reports that she quit smoking about a year ago. Her smoking use included cigarettes. She smoked an average of .25 packs per day. She has never been exposed to tobacco smoke. She has never used smokeless tobacco. She reports that she does not currently use alcohol. She reports that she does not currently use drugs.    Review of Systems -   10 point Review of systems is negative except for; as mentioned above in HPI and PMHx    /80   Ht 1.626 m (5' 4\")   Wt 83.5 kg (184 lb)   LMP 04/15/2022   BMI 31.58 kg/m    EXAM:  GENERAL: Well developed female  HEENT: EOMI, Anicteric Sclera  NECK:  No masses, good flexion and extention of the neck  CARDIAC: RRR w/out murmur   CHEST/LUNG: Clear  ABDOMEN: Umbilical hernia which is not reducible.  The umbilicus is tender to palpation  NEURO: She is ambulatory with good strength in both legs.    IMAGES: none    Assessment/Plan: Pt with an umbilical hernia. I discussed this at length with her.  I went over conservative management as well as surgical treatment for hernias.   I would reccomend a laparoscopic umbilical hernia repair, understanding the possibility of converting to an open operation.   I went over the small risks of surgery including but not limited to bleeding and infection. I discussed the expected recovery time as well. We will schedule this hernia repair at his earliest convenience.    Sahil Méndez MD  [facility]; [facility]  [phone]  "

## 2023-02-24 ENCOUNTER — OFFICE VISIT (OUTPATIENT)
Dept: SURGERY | Facility: CLINIC | Age: 33
End: 2023-02-24
Attending: PHYSICIAN ASSISTANT
Payer: COMMERCIAL

## 2023-02-24 VITALS
BODY MASS INDEX: 31.41 KG/M2 | WEIGHT: 184 LBS | SYSTOLIC BLOOD PRESSURE: 112 MMHG | DIASTOLIC BLOOD PRESSURE: 80 MMHG | HEIGHT: 64 IN

## 2023-02-24 DIAGNOSIS — K42.9 UMBILICAL HERNIA WITHOUT OBSTRUCTION AND WITHOUT GANGRENE: ICD-10-CM

## 2023-02-24 PROCEDURE — 99204 OFFICE O/P NEW MOD 45 MIN: CPT | Performed by: SURGERY

## 2023-02-24 RX ORDER — CEFAZOLIN SODIUM/WATER 2 G/20 ML
2 SYRINGE (ML) INTRAVENOUS SEE ADMIN INSTRUCTIONS
Status: CANCELLED | OUTPATIENT
Start: 2023-03-09

## 2023-02-24 RX ORDER — CEFAZOLIN SODIUM/WATER 2 G/20 ML
2 SYRINGE (ML) INTRAVENOUS
Status: CANCELLED | OUTPATIENT
Start: 2023-03-09

## 2023-02-24 RX ORDER — ACETAMINOPHEN 325 MG/1
975 TABLET ORAL ONCE
Status: CANCELLED | OUTPATIENT
Start: 2023-03-09 | End: 2023-02-24

## 2023-02-24 NOTE — LETTER
2023         RE: Marisol Licea  [address] [place] [id number]        Dear Colleague,    Thank you for referring your patient, Marisol Licea, to the [facility] SUR[facility] [place]. Please see a copy of my visit note below.    Consultation from Dr. Davidson about an Umbilical Hernia    HPI:  This is a 32 year old female here today with concerns of a bulge at the umbilicus.  This swollen area is painful. She has noted this for the past 4 weeks.   She is just status post the delivery of a baby boy by  8 weeks ago.  She is really uncomfortable with this hernia and would like if fixed soon.       Allergies:Patient has no known allergies.    Past Medical History:   Diagnosis Date     Encounter for induction of labor 2022     Failed induction of labor 2022     S/P  2022       Past Surgical History:   Procedure Laterality Date      SECTION N/A 2022    Procedure:  SECTION;  Surgeon: Essence Perkins MD;  Location: [facility] OR     HEMORRHOIDECTOMY         CURRENT MEDS:  Current Outpatient Medications   Medication Sig Dispense Refill     acetaminophen (TYLENOL) 500 MG tablet Take 500-1,000 mg by mouth every 6 hours as needed for mild pain       ferrous sulfate (FEROSUL) 325 (65 Fe) MG tablet Take 325 mg by mouth daily (with breakfast) (Patient not taking: Reported on 2/10/2023)       hydrOXYzine (ATARAX) 25 MG tablet Take 1-2 tablets (25-50 mg) by mouth nightly as needed (insomnia, sleep issues) (Patient not taking: Reported on 2/10/2023) 90 tablet 1     ibuprofen (ADVIL/MOTRIN) 600 MG tablet Take 1 tablet (600 mg) by mouth every 6 hours as needed (Patient not taking: Reported on 2/10/2023) 40 tablet 0     polyethylene glycol (MIRALAX) 17 g packet Take 1 packet by mouth daily (Patient not taking: Reported on 2/10/2023)       Prenatal Vit-Fe Fumarate-FA (PRENATAL PO)  (Patient not taking: Reported on  "2/10/2023)         Family History   Problem Relation Age of Onset     No Known Problems Mother      No Known Problems Father      No Known Problems Sister      No Known Problems Brother      Chronic Obstructive Pulmonary Disease Maternal Grandmother      Chronic Obstructive Pulmonary Disease Maternal Grandfather      Other - See Comments Maternal Grandfather         heart condition     Cancer Paternal Grandmother      Myocardial Infarction Paternal Grandfather         reports that she quit smoking about a year ago. Her smoking use included cigarettes. She smoked an average of .25 packs per day. She has never been exposed to tobacco smoke. She has never used smokeless tobacco. She reports that she does not currently use alcohol. She reports that she does not currently use drugs.    Review of Systems -   10 point Review of systems is negative except for; as mentioned above in HPI and PMHx    /80   Ht 1.626 m (5' 4\")   Wt 83.5 kg (184 lb)   LMP 04/15/2022   BMI 31.58 kg/m    EXAM:  GENERAL: Well developed female  HEENT: EOMI, Anicteric Sclera  NECK:  No masses, good flexion and extention of the neck  CARDIAC: RRR w/out murmur   CHEST/LUNG: Clear  ABDOMEN: Umbilical hernia which is not reducible.  The umbilicus is tender to palpation  NEURO: She is ambulatory with good strength in both legs.    IMAGES: none    Assessment/Plan: Pt with an umbilical hernia. I discussed this at length with her.  I went over conservative management as well as surgical treatment for hernias.   I would reccomend a laparoscopic umbilical hernia repair, understanding the possibility of converting to an open operation.   I went over the small risks of surgery including but not limited to bleeding and infection. I discussed the expected recovery time as well. We will schedule this hernia repair at his earliest convenience.    Sahil Méndez MD  [facility]  [phone]      Again, thank you for allowing me to " participate in the care of your patient.        Sincerely,        Sahil Méndez MD

## 2023-03-03 ENCOUNTER — MYC MEDICAL ADVICE (OUTPATIENT)
Dept: SURGERY | Facility: CLINIC | Age: 33
End: 2023-03-03
Payer: COMMERCIAL

## 2023-03-07 NOTE — TELEPHONE ENCOUNTER
LM that I received all pages of FMLA paperwork at 10:30 this morning. I have completed them, there is no fax number to fax them to. Provided my direct line for patient to call me back to discuss how she would like these forms back.

## 2023-03-08 ENCOUNTER — ANESTHESIA EVENT (OUTPATIENT)
Dept: SURGERY | Facility: HOSPITAL | Age: 33
End: 2023-03-08
Payer: COMMERCIAL

## 2023-03-09 ENCOUNTER — HOSPITAL ENCOUNTER (OUTPATIENT)
Facility: HOSPITAL | Age: 33
Discharge: HOME | End: 2023-03-09
Attending: SURGERY | Admitting: SURGERY
Payer: COMMERCIAL

## 2023-03-09 ENCOUNTER — ANESTHESIA (OUTPATIENT)
Dept: SURGERY | Facility: HOSPITAL | Age: 33
End: 2023-03-09
Payer: COMMERCIAL

## 2023-03-09 VITALS
TEMPERATURE: 97.9 F | WEIGHT: 183.6 LBS | OXYGEN SATURATION: 98 % | BODY MASS INDEX: 31.51 KG/M2 | HEART RATE: 83 BPM | SYSTOLIC BLOOD PRESSURE: 98 MMHG | DIASTOLIC BLOOD PRESSURE: 58 MMHG | RESPIRATION RATE: 16 BRPM

## 2023-03-09 DIAGNOSIS — K42.9 UMBILICAL HERNIA WITHOUT OBSTRUCTION AND WITHOUT GANGRENE: Primary | ICD-10-CM

## 2023-03-09 LAB
HCG SERPL QL: NEGATIVE
HOLD SPECIMEN: NORMAL

## 2023-03-09 PROCEDURE — 84703 CHORIONIC GONADOTROPIN ASSAY: CPT | Performed by: SURGERY

## 2023-03-09 PROCEDURE — 370N000017 HC ANESTHESIA TECHNICAL FEE, PER MIN: Performed by: SURGERY

## 2023-03-09 PROCEDURE — 710N000012 HC RECOVERY PHASE 2, PER MINUTE: Performed by: SURGERY

## 2023-03-09 PROCEDURE — 272N000001 HC OR GENERAL SUPPLY STERILE: Performed by: SURGERY

## 2023-03-09 PROCEDURE — 250N000009 HC RX 250: Performed by: SURGERY

## 2023-03-09 PROCEDURE — 36415 COLL VENOUS BLD VENIPUNCTURE: CPT | Performed by: SURGERY

## 2023-03-09 PROCEDURE — 250N000013 HC RX MED GY IP 250 OP 250 PS 637: Performed by: SURGERY

## 2023-03-09 PROCEDURE — 999N000141 HC STATISTIC PRE-PROCEDURE NURSING ASSESSMENT: Performed by: SURGERY

## 2023-03-09 PROCEDURE — 258N000003 HC RX IP 258 OP 636: Performed by: ANESTHESIOLOGY

## 2023-03-09 PROCEDURE — 250N000011 HC RX IP 250 OP 636: Performed by: SURGERY

## 2023-03-09 PROCEDURE — 250N000025 HC SEVOFLURANE, PER MIN: Performed by: SURGERY

## 2023-03-09 PROCEDURE — 258N000003 HC RX IP 258 OP 636: Performed by: NURSE ANESTHETIST, CERTIFIED REGISTERED

## 2023-03-09 PROCEDURE — 710N000009 HC RECOVERY PHASE 1, LEVEL 1, PER MIN: Performed by: SURGERY

## 2023-03-09 PROCEDURE — 250N000011 HC RX IP 250 OP 636: Performed by: NURSE ANESTHETIST, CERTIFIED REGISTERED

## 2023-03-09 PROCEDURE — C1781 MESH (IMPLANTABLE): HCPCS | Performed by: SURGERY

## 2023-03-09 PROCEDURE — 250N000009 HC RX 250: Performed by: NURSE ANESTHETIST, CERTIFIED REGISTERED

## 2023-03-09 PROCEDURE — 360N000077 HC SURGERY LEVEL 4, PER MIN: Performed by: SURGERY

## 2023-03-09 PROCEDURE — 49591 RPR AA HRN 1ST < 3 CM RDC: CPT | Performed by: SURGERY

## 2023-03-09 PROCEDURE — 250N000011 HC RX IP 250 OP 636: Performed by: ANESTHESIOLOGY

## 2023-03-09 DEVICE — VENTRALEX ST HERNIA PATCH, 6.4 CM (2.5"), CIRCLE
Type: IMPLANTABLE DEVICE | Site: ABDOMEN | Status: FUNCTIONAL
Brand: VENTRALEX

## 2023-03-09 RX ORDER — ONDANSETRON 2 MG/ML
INJECTION INTRAMUSCULAR; INTRAVENOUS PRN
Status: DISCONTINUED | OUTPATIENT
Start: 2023-03-09 | End: 2023-03-09

## 2023-03-09 RX ORDER — METOPROLOL TARTRATE 1 MG/ML
INJECTION, SOLUTION INTRAVENOUS PRN
Status: DISCONTINUED | OUTPATIENT
Start: 2023-03-09 | End: 2023-03-09

## 2023-03-09 RX ORDER — LIDOCAINE HYDROCHLORIDE AND EPINEPHRINE 10; 10 MG/ML; UG/ML
INJECTION, SOLUTION INFILTRATION; PERINEURAL PRN
Status: DISCONTINUED | OUTPATIENT
Start: 2023-03-09 | End: 2023-03-09 | Stop reason: HOSPADM

## 2023-03-09 RX ORDER — FENTANYL CITRATE 50 UG/ML
INJECTION, SOLUTION INTRAMUSCULAR; INTRAVENOUS PRN
Status: DISCONTINUED | OUTPATIENT
Start: 2023-03-09 | End: 2023-03-09

## 2023-03-09 RX ORDER — ACETAMINOPHEN 325 MG/1
650 TABLET ORAL EVERY 4 HOURS PRN
Qty: 50 TABLET | Refills: 0 | Status: SHIPPED | OUTPATIENT
Start: 2023-03-09 | End: 2023-05-15

## 2023-03-09 RX ORDER — NALOXONE HYDROCHLORIDE 0.4 MG/ML
0.2 INJECTION, SOLUTION INTRAMUSCULAR; INTRAVENOUS; SUBCUTANEOUS
Status: DISCONTINUED | OUTPATIENT
Start: 2023-03-09 | End: 2023-03-09 | Stop reason: HOSPADM

## 2023-03-09 RX ORDER — ONDANSETRON 2 MG/ML
4 INJECTION INTRAMUSCULAR; INTRAVENOUS EVERY 30 MIN PRN
Status: DISCONTINUED | OUTPATIENT
Start: 2023-03-09 | End: 2023-03-09 | Stop reason: HOSPADM

## 2023-03-09 RX ORDER — NALOXONE HYDROCHLORIDE 0.4 MG/ML
0.4 INJECTION, SOLUTION INTRAMUSCULAR; INTRAVENOUS; SUBCUTANEOUS
Status: DISCONTINUED | OUTPATIENT
Start: 2023-03-09 | End: 2023-03-09 | Stop reason: HOSPADM

## 2023-03-09 RX ORDER — DIAPER,BRIEF,ADULT, DISPOSABLE
1200 EACH MISCELLANEOUS DAILY
COMMUNITY
End: 2023-05-15

## 2023-03-09 RX ORDER — PROPOFOL 10 MG/ML
INJECTION, EMULSION INTRAVENOUS PRN
Status: DISCONTINUED | OUTPATIENT
Start: 2023-03-09 | End: 2023-03-09

## 2023-03-09 RX ORDER — CEFAZOLIN SODIUM/WATER 2 G/20 ML
2 SYRINGE (ML) INTRAVENOUS SEE ADMIN INSTRUCTIONS
Status: DISCONTINUED | OUTPATIENT
Start: 2023-03-09 | End: 2023-03-09 | Stop reason: HOSPADM

## 2023-03-09 RX ORDER — SODIUM CHLORIDE, SODIUM LACTATE, POTASSIUM CHLORIDE, CALCIUM CHLORIDE 600; 310; 30; 20 MG/100ML; MG/100ML; MG/100ML; MG/100ML
INJECTION, SOLUTION INTRAVENOUS CONTINUOUS
Status: DISCONTINUED | OUTPATIENT
Start: 2023-03-09 | End: 2023-03-09 | Stop reason: HOSPADM

## 2023-03-09 RX ORDER — ONDANSETRON 4 MG/1
4 TABLET, ORALLY DISINTEGRATING ORAL EVERY 30 MIN PRN
Status: DISCONTINUED | OUTPATIENT
Start: 2023-03-09 | End: 2023-03-09 | Stop reason: HOSPADM

## 2023-03-09 RX ORDER — HYDROMORPHONE HYDROCHLORIDE 1 MG/ML
0.4 INJECTION, SOLUTION INTRAMUSCULAR; INTRAVENOUS; SUBCUTANEOUS EVERY 5 MIN PRN
Status: DISCONTINUED | OUTPATIENT
Start: 2023-03-09 | End: 2023-03-09 | Stop reason: HOSPADM

## 2023-03-09 RX ORDER — FENTANYL CITRATE 50 UG/ML
25 INJECTION, SOLUTION INTRAMUSCULAR; INTRAVENOUS EVERY 5 MIN PRN
Status: DISCONTINUED | OUTPATIENT
Start: 2023-03-09 | End: 2023-03-09 | Stop reason: HOSPADM

## 2023-03-09 RX ORDER — BUPIVACAINE HYDROCHLORIDE 2.5 MG/ML
INJECTION, SOLUTION INFILTRATION; PERINEURAL PRN
Status: DISCONTINUED | OUTPATIENT
Start: 2023-03-09 | End: 2023-03-09 | Stop reason: HOSPADM

## 2023-03-09 RX ORDER — ACETAMINOPHEN 325 MG/1
975 TABLET ORAL ONCE
Status: DISCONTINUED | OUTPATIENT
Start: 2023-03-09 | End: 2023-03-09 | Stop reason: HOSPADM

## 2023-03-09 RX ORDER — PROPOFOL 10 MG/ML
INJECTION, EMULSION INTRAVENOUS CONTINUOUS PRN
Status: DISCONTINUED | OUTPATIENT
Start: 2023-03-09 | End: 2023-03-09

## 2023-03-09 RX ORDER — HYDROCODONE BITARTRATE AND ACETAMINOPHEN 5; 325 MG/1; MG/1
1-2 TABLET ORAL EVERY 4 HOURS PRN
Status: DISCONTINUED | OUTPATIENT
Start: 2023-03-09 | End: 2023-03-09 | Stop reason: HOSPADM

## 2023-03-09 RX ORDER — GLYCOPYRROLATE 0.2 MG/ML
INJECTION, SOLUTION INTRAMUSCULAR; INTRAVENOUS PRN
Status: DISCONTINUED | OUTPATIENT
Start: 2023-03-09 | End: 2023-03-09

## 2023-03-09 RX ORDER — FENTANYL CITRATE 50 UG/ML
50 INJECTION, SOLUTION INTRAMUSCULAR; INTRAVENOUS EVERY 5 MIN PRN
Status: DISCONTINUED | OUTPATIENT
Start: 2023-03-09 | End: 2023-03-09 | Stop reason: HOSPADM

## 2023-03-09 RX ORDER — LIDOCAINE 40 MG/G
CREAM TOPICAL
Status: DISCONTINUED | OUTPATIENT
Start: 2023-03-09 | End: 2023-03-09 | Stop reason: HOSPADM

## 2023-03-09 RX ORDER — KETOROLAC TROMETHAMINE 30 MG/ML
INJECTION, SOLUTION INTRAMUSCULAR; INTRAVENOUS PRN
Status: DISCONTINUED | OUTPATIENT
Start: 2023-03-09 | End: 2023-03-09

## 2023-03-09 RX ORDER — HYDROCODONE BITARTRATE AND ACETAMINOPHEN 5; 325 MG/1; MG/1
1-2 TABLET ORAL EVERY 4 HOURS PRN
Qty: 15 TABLET | Refills: 0 | Status: SHIPPED | OUTPATIENT
Start: 2023-03-09 | End: 2023-03-15

## 2023-03-09 RX ORDER — ESMOLOL HYDROCHLORIDE 10 MG/ML
INJECTION INTRAVENOUS PRN
Status: DISCONTINUED | OUTPATIENT
Start: 2023-03-09 | End: 2023-03-09

## 2023-03-09 RX ORDER — FENUGREEK SEED 610 MG
1400 CAPSULE ORAL DAILY
COMMUNITY
End: 2023-05-10

## 2023-03-09 RX ORDER — HYDROMORPHONE HYDROCHLORIDE 1 MG/ML
0.2 INJECTION, SOLUTION INTRAMUSCULAR; INTRAVENOUS; SUBCUTANEOUS EVERY 5 MIN PRN
Status: DISCONTINUED | OUTPATIENT
Start: 2023-03-09 | End: 2023-03-09 | Stop reason: HOSPADM

## 2023-03-09 RX ORDER — DEXAMETHASONE SODIUM PHOSPHATE 10 MG/ML
INJECTION, SOLUTION INTRAMUSCULAR; INTRAVENOUS PRN
Status: DISCONTINUED | OUTPATIENT
Start: 2023-03-09 | End: 2023-03-09

## 2023-03-09 RX ORDER — ACETAMINOPHEN 325 MG/1
975 TABLET ORAL ONCE
Status: COMPLETED | OUTPATIENT
Start: 2023-03-09 | End: 2023-03-09

## 2023-03-09 RX ORDER — CEFAZOLIN SODIUM/WATER 2 G/20 ML
2 SYRINGE (ML) INTRAVENOUS
Status: COMPLETED | OUTPATIENT
Start: 2023-03-09 | End: 2023-03-09

## 2023-03-09 RX ORDER — IBUPROFEN 600 MG/1
600 TABLET, FILM COATED ORAL EVERY 6 HOURS PRN
Qty: 30 TABLET | Refills: 0 | Status: SHIPPED | OUTPATIENT
Start: 2023-03-09 | End: 2023-05-10

## 2023-03-09 RX ORDER — MAGNESIUM SULFATE 4 G/50ML
4 INJECTION INTRAVENOUS ONCE
Status: COMPLETED | OUTPATIENT
Start: 2023-03-09 | End: 2023-03-09

## 2023-03-09 RX ORDER — LIDOCAINE HYDROCHLORIDE 10 MG/ML
INJECTION, SOLUTION INFILTRATION; PERINEURAL PRN
Status: DISCONTINUED | OUTPATIENT
Start: 2023-03-09 | End: 2023-03-09

## 2023-03-09 RX ORDER — KETAMINE HYDROCHLORIDE 10 MG/ML
INJECTION INTRAMUSCULAR; INTRAVENOUS PRN
Status: DISCONTINUED | OUTPATIENT
Start: 2023-03-09 | End: 2023-03-09

## 2023-03-09 RX ADMIN — FENTANYL CITRATE 100 MCG: 50 INJECTION, SOLUTION INTRAMUSCULAR; INTRAVENOUS at 08:03

## 2023-03-09 RX ADMIN — PROPOFOL 200 MG: 10 INJECTION, EMULSION INTRAVENOUS at 08:03

## 2023-03-09 RX ADMIN — FENTANYL CITRATE 25 MCG: 50 INJECTION, SOLUTION INTRAMUSCULAR; INTRAVENOUS at 10:05

## 2023-03-09 RX ADMIN — METOPROLOL TARTRATE 2.5 MG: 5 INJECTION INTRAVENOUS at 09:09

## 2023-03-09 RX ADMIN — HYDROCODONE BITARTRATE AND ACETAMINOPHEN 1 TABLET: 5; 325 TABLET ORAL at 11:02

## 2023-03-09 RX ADMIN — HYDROMORPHONE HYDROCHLORIDE 0.5 MG: 1 INJECTION, SOLUTION INTRAMUSCULAR; INTRAVENOUS; SUBCUTANEOUS at 08:42

## 2023-03-09 RX ADMIN — ONDANSETRON 4 MG: 2 INJECTION INTRAMUSCULAR; INTRAVENOUS at 08:07

## 2023-03-09 RX ADMIN — LIDOCAINE HYDROCHLORIDE 50 MG: 10 INJECTION, SOLUTION INFILTRATION; PERINEURAL at 08:03

## 2023-03-09 RX ADMIN — ROCURONIUM BROMIDE 10 MG: 50 INJECTION, SOLUTION INTRAVENOUS at 08:30

## 2023-03-09 RX ADMIN — MAGNESIUM SULFATE HEPTAHYDRATE 4 G: 80 INJECTION, SOLUTION INTRAVENOUS at 06:56

## 2023-03-09 RX ADMIN — GLYCOPYRROLATE 0.2 MG: 0.2 INJECTION, SOLUTION INTRAMUSCULAR; INTRAVENOUS at 08:02

## 2023-03-09 RX ADMIN — SODIUM CHLORIDE, POTASSIUM CHLORIDE, SODIUM LACTATE AND CALCIUM CHLORIDE: 600; 310; 30; 20 INJECTION, SOLUTION INTRAVENOUS at 09:02

## 2023-03-09 RX ADMIN — ROCURONIUM BROMIDE 50 MG: 50 INJECTION, SOLUTION INTRAVENOUS at 08:03

## 2023-03-09 RX ADMIN — ESMOLOL HYDROCHLORIDE 20 MG: 10 INJECTION, SOLUTION INTRAVENOUS at 08:08

## 2023-03-09 RX ADMIN — ACETAMINOPHEN 975 MG: 325 TABLET ORAL at 06:38

## 2023-03-09 RX ADMIN — KETAMINE HYDROCHLORIDE 30 MG: 10 INJECTION, SOLUTION INTRAMUSCULAR; INTRAVENOUS at 08:17

## 2023-03-09 RX ADMIN — PHENYLEPHRINE HYDROCHLORIDE 100 MCG: 10 INJECTION INTRAVENOUS at 08:24

## 2023-03-09 RX ADMIN — ESMOLOL HYDROCHLORIDE 20 MG: 10 INJECTION, SOLUTION INTRAVENOUS at 08:31

## 2023-03-09 RX ADMIN — HYDROMORPHONE HYDROCHLORIDE 0.5 MG: 1 INJECTION, SOLUTION INTRAMUSCULAR; INTRAVENOUS; SUBCUTANEOUS at 08:31

## 2023-03-09 RX ADMIN — PROPOFOL 50 MCG/KG/MIN: 10 INJECTION, EMULSION INTRAVENOUS at 08:03

## 2023-03-09 RX ADMIN — SUGAMMADEX 200 MG: 100 INJECTION, SOLUTION INTRAVENOUS at 09:24

## 2023-03-09 RX ADMIN — SODIUM CHLORIDE, POTASSIUM CHLORIDE, SODIUM LACTATE AND CALCIUM CHLORIDE: 600; 310; 30; 20 INJECTION, SOLUTION INTRAVENOUS at 07:38

## 2023-03-09 RX ADMIN — Medication 2 G: at 08:11

## 2023-03-09 RX ADMIN — DEXAMETHASONE SODIUM PHOSPHATE 10 MG: 10 INJECTION, SOLUTION INTRAMUSCULAR; INTRAVENOUS at 08:07

## 2023-03-09 RX ADMIN — KETOROLAC TROMETHAMINE 30 MG: 30 INJECTION, SOLUTION INTRAMUSCULAR at 09:19

## 2023-03-09 RX ADMIN — HYDROCODONE BITARTRATE AND ACETAMINOPHEN 1 TABLET: 5; 325 TABLET ORAL at 14:36

## 2023-03-09 RX ADMIN — ESMOLOL HYDROCHLORIDE 20 MG: 10 INJECTION, SOLUTION INTRAVENOUS at 08:57

## 2023-03-09 RX ADMIN — MIDAZOLAM 2 MG: 1 INJECTION INTRAMUSCULAR; INTRAVENOUS at 07:51

## 2023-03-09 ASSESSMENT — ACTIVITIES OF DAILY LIVING (ADL)
ADLS_ACUITY_SCORE: 18

## 2023-03-09 NOTE — OP NOTE
Operative Note    Name:  Marisol Licea  Location: [facility] Main OR  Procedure Date:  3/9/2023  PCP:  Dee Davidson    Surgery Performed:  Procedure/Surgery Information   Procedure: Procedure(s):  HERNIORRHAPHY, UMBILICAL, LAPAROSCOPIC   Surgeon(s): Surgeon(s) and Role:     * Sahil Méndez MD - Primary   Specimens: * No specimens in log *            Pre-Procedure Diagnosis:  Umbilical hernia without obstruction and without gangrene [K42.9]    Post-Procedure Diagnosis:    Umbilical hernia without obstruction and without gangrene [K42.9]    Anesthesia:  General     Past Medical History:   Diagnosis Date     Encounter for induction of labor 2022     Failed induction of labor 2022     S/P  2022       Patient Active Problem List    Diagnosis Date Noted     S/P  2022     Priority: Medium     Cholestasis during pregnancy 2022     Priority: Medium     PCOS (polycystic ovarian syndrome) 2022     Priority: Medium     2017, dx with US, irregular periods and hx cyst rupture       Hemorrhoids, unspecified hemorrhoid type 2022     Priority: Medium     Both internal and external, s/p hemorrhoidectomy - working with colorectal [place]       ACL tear 2009     Priority: Medium     Formatting of this note might be different from the original.  ACL Tear, Left         Findings:  2 cm umbilical hernia defect    Operative Report:    Patient is brought the operating placed supine position given general endotracheal anesthesia sterilely prepped and draped in usual surgical fashion and timeout process is undertaken.    A 5 mm trocars brought in the right subcostal position under direct visualization of 0 degree laparoscope using the Optiview technique.  Pneumoperitoneum was brought up to 15 mmHg and 2 additional 5 mm trocars were placed in the patient's left side under direct visualization of the laparoscope.  The initial 5 mm trocar was eventually  upsized to an 11 mm trocar.    The peritoneum was peeled back and away from the umbilicus and the preperitoneal fat was reduced from a 2 cm umbilical hernia.  The peritoneum was dissected back and away from the edge of the hernia defect circumferentially around the hernia.  I then suzanne the hernia closed with a running #1 strata fix suture using intracorporeal suturing techniques.    A 6.4 cm Ventralex mesh was brought into the intra-abdominal cavity a 0 PDS suture had been affixed to the midportion of this mesh and a punctate incision was made inferior aspect of the umbilicus.  With that I passed a suture passer into the intra-abdominal cavity and grabbed 1 and of the suture affixed to the midportion of the mesh and brought it out below the hernia repair I readvanced the suture passer and pulled the other arm up superior to the repair.  I then secured that sutured down to bring a transabdominal suture down across the hernia repair with that 0 PDS suture material and to draw the mesh up against the anterior abdominal wall.  The mesh was fit underneath of the peritoneal reflection that had been performed earlier in the procedure.  The mesh was secured to the anterior abdominal wall with a series of absorbable fascial tacks using the secure strap system.  The peritoneum was then closed over the mesh implantation with a running 3-0 V-Loc suture.    Tap blocks were done on either side with quarter percent Marcaine with epinephrine under direct visualization of the laparoscope.  The 11 mm trocar was removed and the fascia at that site was closed with a 0 Vicryl suture placed with an Endo fascial closure device under direct visualization.  The pneumoperitoneum was deflated trocars removed all skin incisions were closed with 4-0 subcuticular Monocryl sutures.  The trocar incisions were dressed with Telfa and Tegaderm the punctate wound at the umbilicus was drawn together with a Steri-Strip.  The patient was extubated and  "taken to recovery.    Estimated Blood Loss:   5 cc       Drains:        Implants:  Implant Name Type Inv. Item Serial No.  Lot No. LRB No. Used Action   MESH VENTRALEX HERNIA 2.5\" Platinum MED W/STRAP [id number] - SNA Mesh MESH VENTRALEX HERNIA 2.5\" Platinum MED W/STRAP [id number] NA CR [employer] [facility] [id number] N/A 1 Implanted       Complications:    None    Sahil Méndez MD     Date: 3/9/2023  Time: 9:37 AM  "

## 2023-03-09 NOTE — INTERVAL H&P NOTE
"I have reviewed the surgical (or preoperative) H&P that is linked to this encounter, and examined the patient. There are no significant changes    Clinical Conditions Present on Arrival:  Clinically Significant Risk Factors Present on Admission                    # Obesity: Estimated body mass index is 31.51 kg/m  as calculated from the following:    Height as of 2/24/23: 1.626 m (5' 4\").    Weight as of this encounter: 83.3 kg (183 lb 9.6 oz).       "

## 2023-03-09 NOTE — ANESTHESIA CARE TRANSFER NOTE
Patient: Marisol Licea    Procedure: Procedure(s):  HERNIORRHAPHY, UMBILICAL, LAPAROSCOPIC       Diagnosis: Umbilical hernia without obstruction and without gangrene [K42.9]  Diagnosis Additional Information: No value filed.    Anesthesia Type:   General     Note:    Oropharynx: oropharynx clear of all foreign objects and spontaneously breathing  Level of Consciousness: drowsy  Oxygen Supplementation: face mask  Level of Supplemental Oxygen (L/min / FiO2): 6  Independent Airway: airway patency satisfactory and stable  Dentition: dentition unchanged  Vital Signs Stable: post-procedure vital signs reviewed and stable  Report to RN Given: handoff report given  Patient transferred to: PACU    Handoff Report: Identifed the Patient, Identified the Reponsible Provider, Reviewed the pertinent medical history, Discussed the surgical course, Reviewed Intra-OP anesthesia mangement and issues during anesthesia, Set expectations for post-procedure period and Allowed opportunity for questions and acknowledgement of understanding      Vitals:  Vitals Value Taken Time   /61 03/09/23 0948   Temp 36.6  C (97.9  F) 03/09/23 0946   Pulse 102 03/09/23 0950   Resp 4 03/09/23 0950   SpO2 100 % 03/09/23 0950   Vitals shown include unvalidated device data.    Electronically Signed By: MILKA Corona CRNA  March 9, 2023  9:52 AM

## 2023-03-09 NOTE — ANESTHESIA PROCEDURE NOTES
Airway       Patient location during procedure: OR       Procedure Start/Stop Times: 3/9/2023 8:06 AM  Staff -        CRNA: Felix Finn APRN CRNA       Performed By: CRNA  Consent for Airway        Urgency: elective  Indications and Patient Condition       Indications for airway management: sheila-procedural         Mask difficulty assessment: 1 - vent by mask    Final Airway Details       Final airway type: endotracheal airway       Successful airway: ETT - single  Endotracheal Airway Details        ETT size (mm): 7.0       Cuffed: yes       Successful intubation technique: direct laryngoscopy       DL Blade Type: Stone 2       Grade View of Cords: 1       Adjucts: stylet       Position: Right       Measured from: gums/teeth       Secured at (cm): 21    Post intubation assessment        Placement verified by: capnometry, equal breath sounds and chest rise        Number of attempts at approach: 1       Number of other approaches attempted: 0       Secured with: silk tape       Ease of procedure: easy       Dental guard used and removed.    Medication(s) Administered   Medication Administration Time: 3/9/2023 8:06 AM

## 2023-03-09 NOTE — ANESTHESIA POSTPROCEDURE EVALUATION
Patient: Marisol Licea    Procedure: Procedure(s):  HERNIORRHAPHY, UMBILICAL, LAPAROSCOPIC       Anesthesia Type:  General    Note:  Disposition: Outpatient   Postop Pain Control: Uneventful            Sign Out: Well controlled pain   PONV: No   Neuro/Psych: Uneventful            Sign Out: Acceptable/Baseline neuro status   Airway/Respiratory: Uneventful            Sign Out: Acceptable/Baseline resp. status   CV/Hemodynamics: Uneventful            Sign Out: Acceptable CV status; No obvious hypovolemia; No obvious fluid overload   Other NRE: NONE   DID A NON-ROUTINE EVENT OCCUR? No           Last vitals:  Vitals Value Taken Time   /58 03/09/23 1030   Temp 36.6  C (97.9  F) 03/09/23 1015   Pulse 77 03/09/23 1036   Resp 15 03/09/23 1018   SpO2 99 % 03/09/23 1036   Vitals shown include unvalidated device data.    Electronically Signed By: Piotr Tierney MD  March 9, 2023  4:46 PM

## 2023-03-09 NOTE — ANESTHESIA PREPROCEDURE EVALUATION
Anesthesia Pre-Procedure Evaluation    Patient: Marisol Licea   MRN: [id number] : 1990        Procedure : Procedure(s):  HERNIORRHAPHY, UMBILICAL, LAPAROSCOPIC          Past Medical History:   Diagnosis Date     Encounter for induction of labor 2022     Failed induction of labor 2022     S/P  2022      Past Surgical History:   Procedure Laterality Date      SECTION N/A 2022    Procedure:  SECTION;  Surgeon: Essence Perkins MD;  Location: [facility] OR     HEMORRHOIDECTOMY        No Known Allergies   Social History     Tobacco Use     Smoking status: Former     Packs/day: 0.25     Types: Cigarettes     Quit date: 2022     Years since quittin.0     Passive exposure: Never     Smokeless tobacco: Never   Substance Use Topics     Alcohol use: Not Currently      Wt Readings from Last 1 Encounters:   23 83.3 kg (183 lb 9.6 oz)        Anesthesia Evaluation   Pt has had prior anesthetic.         ROS/MED HX  ENT/Pulmonary:  - neg pulmonary ROS     Neurologic:  - neg neurologic ROS     Cardiovascular:  - neg cardiovascular ROS     METS/Exercise Tolerance:     Hematologic:  - neg hematologic  ROS     Musculoskeletal:  - neg musculoskeletal ROS     GI/Hepatic:     (+) liver disease,     Renal/Genitourinary:  - neg Renal ROS     Endo:     (+) Obesity,     Psychiatric/Substance Use:  - neg psychiatric ROS     Infectious Disease:  - neg infectious disease ROS     Malignancy:       Other:  - neg other ROS          Physical Exam    Airway  airway exam normal      Mallampati: I   TM distance: > 3 FB   Neck ROM: full     Respiratory Devices and Support         Dental       (+) Minor Abnormalities - some fillings, tiny chips      Cardiovascular   cardiovascular exam normal       Rhythm and rate: regular and normal     Pulmonary   pulmonary exam normal        breath sounds clear to auscultation           OUTSIDE LABS:  CBC:   Lab Results    Component Value Date    WBC 4.8 02/10/2023    WBC 15.1 (H) 12/25/2022    HGB 12.8 02/10/2023    HGB 9.7 (L) 12/29/2022    HCT 38.0 02/10/2023    HCT 37.2 12/25/2022     02/10/2023     12/25/2022     BMP:   Lab Results   Component Value Date     02/10/2023     01/07/2019    POTASSIUM 4.2 02/10/2023    POTASSIUM 3.5 01/07/2019    CHLORIDE 102 02/10/2023    CHLORIDE 105 01/07/2019    CO2 22 02/10/2023    CO2 23 01/07/2019    BUN 9.1 02/10/2023    BUN 6 (L) 01/07/2019    CR 0.88 02/10/2023    CR 0.85 01/07/2019    GLC 92 02/10/2023     01/07/2019     COAGS: No results found for: PTT, INR, FIBR  POC:   Lab Results   Component Value Date    HCG Positive (A) 05/24/2022     HEPATIC:   Lab Results   Component Value Date    ALBUMIN 4.5 02/10/2023    PROTTOTAL 7.4 02/10/2023    ALT 20 02/10/2023    AST 21 02/10/2023    ALKPHOS 63 02/10/2023    BILITOTAL 0.2 02/10/2023     OTHER:   Lab Results   Component Value Date    A1C 5.2 08/08/2017    CLARISSA 9.4 02/10/2023    MAG 1.8 09/07/2022    LIPASE 14 01/07/2019    TSH 0.99 09/07/2022       Anesthesia Plan    ASA Status:  2   NPO Status:  NPO Appropriate    Anesthesia Type: General.     - Airway: ETT   Induction: Intravenous, Propofol.   Maintenance: Balanced.        Consents    Anesthesia Plan(s) and associated risks, benefits, and realistic alternatives discussed. Questions answered and patient/representative(s) expressed understanding.    - Discussed:     - Discussed with:  Patient         Postoperative Care    Pain management: IV analgesics, Oral pain medications, Multi-modal analgesia.   PONV prophylaxis: Ondansetron (or other 5HT-3), Dexamethasone or Solumedrol     Comments:                Piotr Tierney MD

## 2023-03-10 ENCOUNTER — MYC MEDICAL ADVICE (OUTPATIENT)
Dept: SURGERY | Facility: CLINIC | Age: 33
End: 2023-03-10
Payer: COMMERCIAL

## 2023-03-10 DIAGNOSIS — G89.18 ACUTE POST-OPERATIVE PAIN: Primary | ICD-10-CM

## 2023-03-10 RX ORDER — OXYCODONE HYDROCHLORIDE 5 MG/1
5 TABLET ORAL EVERY 6 HOURS PRN
Qty: 15 TABLET | Refills: 0 | Status: CANCELLED | OUTPATIENT
Start: 2023-03-10 | End: 2023-03-13

## 2023-03-10 NOTE — TELEPHONE ENCOUNTER
Pt asking to change medication from Hydrocodone to Oxycodone due to side effects. States that she has use Oxycodone in the past with good pain relief and less side effect of nausea.

## 2023-03-14 ENCOUNTER — TELEPHONE (OUTPATIENT)
Dept: SURGERY | Facility: CLINIC | Age: 33
End: 2023-03-14

## 2023-03-14 NOTE — TELEPHONE ENCOUNTER
Patient said she has a Dr. Note she is supposed to return to work tomorrow.  She said there is no way she can go back yet she is still  in  so much pain she can't even  her 2 month old son and when she coughs the pain is even worse.    Please call and advise.    Thanks!

## 2023-03-15 ENCOUNTER — TELEPHONE (OUTPATIENT)
Dept: SURGERY | Facility: CLINIC | Age: 33
End: 2023-03-15

## 2023-03-15 ENCOUNTER — OFFICE VISIT (OUTPATIENT)
Dept: SURGERY | Facility: CLINIC | Age: 33
End: 2023-03-15
Payer: COMMERCIAL

## 2023-03-15 VITALS — SYSTOLIC BLOOD PRESSURE: 118 MMHG | DIASTOLIC BLOOD PRESSURE: 74 MMHG

## 2023-03-15 DIAGNOSIS — L76.82 PAIN AT SURGICAL INCISION: ICD-10-CM

## 2023-03-15 DIAGNOSIS — K42.9 UMBILICAL HERNIA WITHOUT OBSTRUCTION AND WITHOUT GANGRENE: Primary | ICD-10-CM

## 2023-03-15 PROCEDURE — 99214 OFFICE O/P EST MOD 30 MIN: CPT | Performed by: SURGERY

## 2023-03-15 NOTE — TELEPHONE ENCOUNTER
Pt came in to clinic for PO visit, gave her the [facility] paperwork at her request and wrote up a note for her. She will give to her employer herself, no need to fax.

## 2023-03-15 NOTE — LETTER
3/15/2023         RE: Marisol Licea  [address] [place] [id number]        Dear Colleague,    Thank you for referring your patient, Marisol Licea, to the [facility] [place]. Please see a copy of my visit note below.    HPI: Pt is here for follow up of a robotic umbilical hernia repair.   She complains of considerable pain associated with the upper trocar site.  She does not feel prepared to go back to work with the symptoms she is currently experiencing.  No difficulties with the surgical wound/wounds.  she is eating well and denies fever and chills.         /74   LMP 04/15/2022     EXAM:  GENERAL:Appears well  ABDOMEN:  Soft, +BS  SURGICAL WOUNDS:  Incisions healing well, no enduration or drainage.          Assessment/Plan:  Doing OK after surgery, but she continues to have considerable pain associated with the upper trocar site and with that I recommend she take some more time off of work.  She should follow up as needed.    Sahil Méndez MD ,MD  [facility]: Department of Surgery        Again, thank you for allowing me to participate in the care of your patient.        Sincerely,        Sahil Méndez MD

## 2023-03-15 NOTE — PROGRESS NOTES
HPI: Pt is here for follow up of a robotic umbilical hernia repair.   She complains of considerable pain associated with the upper trocar site.  She does not feel prepared to go back to work with the symptoms she is currently experiencing.  No difficulties with the surgical wound/wounds.  she is eating well and denies fever and chills.         /74   LMP 04/15/2022     EXAM:  GENERAL:Appears well  ABDOMEN:  Soft, +BS  SURGICAL WOUNDS:  Incisions healing well, no enduration or drainage.          Assessment/Plan:  Doing OK after surgery, but she continues to have considerable pain associated with the upper trocar site and with that I recommend she take some more time off of work.  She should follow up as needed.    Sahil Méndez MD ,MD  [facility], [facility]: Department of Surgery

## 2023-03-15 NOTE — TELEPHONE ENCOUNTER
LM to see if pt consuelo like to see Lizandro our PA tomorrow instead of . Provided our call back number for her.

## 2023-04-02 ENCOUNTER — MYC MEDICAL ADVICE (OUTPATIENT)
Dept: FAMILY MEDICINE | Facility: CLINIC | Age: 33
End: 2023-04-02
Payer: COMMERCIAL

## 2023-04-02 DIAGNOSIS — B00.1 HERPES LABIALIS: Primary | ICD-10-CM

## 2023-04-03 ENCOUNTER — E-VISIT (OUTPATIENT)
Dept: URGENT CARE | Facility: CLINIC | Age: 33
End: 2023-04-03
Payer: COMMERCIAL

## 2023-04-03 DIAGNOSIS — B00.1 COLD SORE: Primary | ICD-10-CM

## 2023-04-03 PROCEDURE — 99421 OL DIG E/M SVC 5-10 MIN: CPT | Performed by: PREVENTIVE MEDICINE

## 2023-04-03 RX ORDER — VALACYCLOVIR HYDROCHLORIDE 1 G/1
2000 TABLET, FILM COATED ORAL 2 TIMES DAILY
Qty: 4 TABLET | Refills: 0 | Status: ON HOLD | OUTPATIENT
Start: 2023-04-03 | End: 2024-01-08

## 2023-04-03 NOTE — TELEPHONE ENCOUNTER
If this is a new prescription for her, yes, we need an encounter to link the new medication to for better documentation.  An E-visit would suffice.  This is one of the benefits of having a primary care provider, in future, she would likely be able to get refills without further visits.

## 2023-04-05 RX ORDER — VALACYCLOVIR HYDROCHLORIDE 1 G/1
2000 TABLET, FILM COATED ORAL 2 TIMES DAILY
Qty: 4 TABLET | Refills: 0 | Status: SHIPPED | OUTPATIENT
Start: 2023-04-05 | End: 2023-05-10

## 2023-04-05 NOTE — TELEPHONE ENCOUNTER
Routing to covering provider to review and advise. Patient submitted eVisit, which was addressed by one of the Virtual Urgent Care provider and was given 4 tabs total- take 2 tabs BID for one day.    TONY CornellN, RN  [facility]

## 2023-04-12 ENCOUNTER — MYC MEDICAL ADVICE (OUTPATIENT)
Dept: FAMILY MEDICINE | Facility: CLINIC | Age: 33
End: 2023-04-12
Payer: COMMERCIAL

## 2023-04-12 DIAGNOSIS — Z34.00 SUPERVISION OF NORMAL FIRST PREGNANCY, ANTEPARTUM: ICD-10-CM

## 2023-04-13 RX ORDER — HYDROXYZINE HYDROCHLORIDE 25 MG/1
25-50 TABLET, FILM COATED ORAL
Qty: 30 TABLET | Refills: 1 | Status: SHIPPED | OUTPATIENT
Start: 2023-04-13 | End: 2023-05-25

## 2023-05-10 ENCOUNTER — OFFICE VISIT (OUTPATIENT)
Dept: FAMILY MEDICINE | Facility: CLINIC | Age: 33
End: 2023-05-10
Payer: COMMERCIAL

## 2023-05-10 VITALS
RESPIRATION RATE: 20 BRPM | WEIGHT: 183.5 LBS | DIASTOLIC BLOOD PRESSURE: 82 MMHG | HEIGHT: 65 IN | SYSTOLIC BLOOD PRESSURE: 116 MMHG | HEART RATE: 78 BPM | TEMPERATURE: 98.5 F | BODY MASS INDEX: 30.57 KG/M2 | OXYGEN SATURATION: 99 %

## 2023-05-10 DIAGNOSIS — Z32.01 PREGNANCY TEST POSITIVE: Primary | ICD-10-CM

## 2023-05-10 DIAGNOSIS — N92.6 MISSED PERIOD: ICD-10-CM

## 2023-05-10 LAB — HCG UR QL: POSITIVE

## 2023-05-10 PROCEDURE — 99213 OFFICE O/P EST LOW 20 MIN: CPT | Performed by: FAMILY MEDICINE

## 2023-05-10 PROCEDURE — 81025 URINE PREGNANCY TEST: CPT | Performed by: FAMILY MEDICINE

## 2023-05-10 ASSESSMENT — PATIENT HEALTH QUESTIONNAIRE - PHQ9
SUM OF ALL RESPONSES TO PHQ QUESTIONS 1-9: 9
SUM OF ALL RESPONSES TO PHQ QUESTIONS 1-9: 9
10. IF YOU CHECKED OFF ANY PROBLEMS, HOW DIFFICULT HAVE THESE PROBLEMS MADE IT FOR YOU TO DO YOUR WORK, TAKE CARE OF THINGS AT HOME, OR GET ALONG WITH OTHER PEOPLE: SOMEWHAT DIFFICULT

## 2023-05-10 ASSESSMENT — PAIN SCALES - GENERAL: PAINLEVEL: NO PAIN (0)

## 2023-05-10 NOTE — PROGRESS NOTES
"  Assessment & Plan     Pregnancy test positive    - US OB < 14 Weeks Single    Missed period    - HCG Qual, Urine ([id number])  - HCG Qual, Urine ([id number])    Pregnancy test is positive today in clinic.  We will obtain dating ultrasound.  She would like to see Dr. Davidson for her OB care.  We will get her scheduled for her first OB visit once ultrasound results are available.  Advised her to start taking prenatal vitamins as soon as possible.             BMI:   Estimated body mass index is 30.54 kg/m  as calculated from the following:    Height as of this encounter: 1.651 m (5' 5\").    Weight as of this encounter: 83.2 kg (183 lb 8 oz).           Tayler Sanchez MD  [facility] Damon is a 32 year old, presenting for the following health issues:  Pregnancy Test (Pregnancy confirmation )        2022     7:58 AM   Additional Questions   Roomed by Nina FRIAS   She is here today for confirmation of pregnancy.  She is approximately 4-1/2 months postpartum.  Delivered on 2022 via .  She thinks that she has had a couple of periods since her delivery.  She is not breast-feeding.  She does not know when her last menstrual period was.  She did have a hernia surgery on  and her pregnancy test was negative at that time.  She has taken 2 home pregnancy test this morning and they have both been positive.  Yesterday looked down at her belly and just had a feeling that she was pregnant.  Woke up this morning and was feeling nauseous and sick so did a pregnancy test and it was positive.  She has not noticed any breast tenderness but has been feeling tired.  She has assumed this is from having an infant and not getting adequate sleep.  She has had some nausea in the morning the last few days.  She has no other concerns or questions.  She is not currently taking prenatal vitamins but does not have them at home and plans to start taking them right " "away.          Review of Systems         Objective    /82 (BP Location: Right arm, Patient Position: Sitting, Cuff Size: Adult Regular)   Pulse 78   Temp 98.5  F (36.9  C) (Temporal)   Resp 20   Ht 1.651 m (5' 5\")   Wt 83.2 kg (183 lb 8 oz)   LMP  (LMP Unknown)   SpO2 99%   BMI 30.54 kg/m    Body mass index is 30.54 kg/m .  Physical Exam   GENERAL: healthy, alert and no distress  PSYCH: mentation appears normal, affect normal/bright    Results for orders placed or performed in visit on 05/10/23 (from the past 24 hour(s))   HCG Qual, Urine ([id number])   Result Value Ref Range    hCG Urine Qualitative Positive (A) Negative                   "

## 2023-05-11 ENCOUNTER — HOSPITAL ENCOUNTER (OUTPATIENT)
Dept: ULTRASOUND IMAGING | Facility: CLINIC | Age: 33
Discharge: HOME | End: 2023-05-11
Attending: FAMILY MEDICINE | Admitting: FAMILY MEDICINE
Payer: COMMERCIAL

## 2023-05-11 ENCOUNTER — MYC MEDICAL ADVICE (OUTPATIENT)
Dept: FAMILY MEDICINE | Facility: CLINIC | Age: 33
End: 2023-05-11
Payer: COMMERCIAL

## 2023-05-11 DIAGNOSIS — Z32.01 POSITIVE PREGNANCY TEST: Primary | ICD-10-CM

## 2023-05-11 DIAGNOSIS — R93.5 ABNORMAL ULTRASOUND OF UTERUS: ICD-10-CM

## 2023-05-11 DIAGNOSIS — Z32.01 PREGNANCY TEST POSITIVE: ICD-10-CM

## 2023-05-11 PROCEDURE — 76801 OB US < 14 WKS SINGLE FETUS: CPT

## 2023-05-12 NOTE — TELEPHONE ENCOUNTER
See MyChart from patient needing provider direction.    Please respond directly to patient if appropriate.    Argelia Vegas RN  [facility]

## 2023-05-14 ENCOUNTER — OFFICE VISIT (OUTPATIENT)
Dept: FAMILY MEDICINE | Facility: CLINIC | Age: 33
End: 2023-05-14
Payer: COMMERCIAL

## 2023-05-14 VITALS
HEART RATE: 80 BPM | SYSTOLIC BLOOD PRESSURE: 135 MMHG | OXYGEN SATURATION: 97 % | DIASTOLIC BLOOD PRESSURE: 88 MMHG | TEMPERATURE: 98.3 F | RESPIRATION RATE: 15 BRPM

## 2023-05-14 DIAGNOSIS — R07.0 THROAT PAIN: Primary | ICD-10-CM

## 2023-05-14 LAB
DEPRECATED S PYO AG THROAT QL EIA: NEGATIVE
GROUP A STREP BY PCR: NOT DETECTED

## 2023-05-14 PROCEDURE — 99213 OFFICE O/P EST LOW 20 MIN: CPT | Performed by: PHYSICIAN ASSISTANT

## 2023-05-14 PROCEDURE — 87651 STREP A DNA AMP PROBE: CPT | Performed by: PHYSICIAN ASSISTANT

## 2023-05-14 NOTE — PROGRESS NOTES
Assessment & Plan:      Problem List Items Addressed This Visit    None  Visit Diagnoses     Throat pain    -  Primary    Relevant Orders    Streptococcus A Rapid Screen w/Reflex to PCR - Clinic Collect (Completed)    Group A Streptococcus PCR Throat Swab        Medical Decision Making  Patient presents with throat pains.  Rapid strep is negative.  Suspect likely viral upper respiratory infection at this time.  Discussed treatment and symptomatic care.  Allergies and medication interactions reviewed.  Discussed signs of worsening symptoms and when to follow-up with PCP if no symptom improvement.     Subjective:      Marisol Licea is a 32 year old female here for evaluation of sore throat.  Onset of symptoms was 1 to 2 days ago.  Patient has possible exposure to strep throat through her child's .  No fevers, cough, rhinorrhea.     The following portions of the patient's history were reviewed and updated as appropriate: allergies, current medications, and problem list.     Review of Systems  Pertinent items are noted in HPI.    Allergies  No Known Allergies    Family History   Problem Relation Age of Onset     No Known Problems Mother      No Known Problems Father      No Known Problems Sister      No Known Problems Brother      Chronic Obstructive Pulmonary Disease Maternal Grandmother      Chronic Obstructive Pulmonary Disease Maternal Grandfather      Other - See Comments Maternal Grandfather         heart condition     Cancer Paternal Grandmother      Myocardial Infarction Paternal Grandfather        Social History     Tobacco Use     Smoking status: Former     Packs/day: 0.25     Types: Cigarettes     Quit date: 2022     Years since quittin.2     Passive exposure: Never     Smokeless tobacco: Never   Vaping Use     Vaping status: Never Used   Substance Use Topics     Alcohol use: Not Currently        Objective:      /88   Pulse 80   Temp 98.3  F (36.8  C)   Resp 15   LMP  (LMP  Unknown)   SpO2 97%   General appearance - alert, well appearing, and in no distress and non-toxic  Ears - bilateral TM's and external ear canals normal  Nose - normal and patent, no erythema, discharge or polyps  Mouth - Posterior oropharynx is not erythematous with mild exudate seen worse on right compared to left, no significant tonsillar swelling, mucous members moist  Neck - supple, no significant adenopathy  Chest - clear to auscultation, no wheezes, rales or rhonchi, symmetric air entry  Heart - normal rate, regular rhythm, normal S1, S2, no murmurs, rubs, clicks or gallops     Lab & Imaging Results    Results for orders placed or performed in visit on 05/14/23   Streptococcus A Rapid Screen w/Reflex to PCR - Clinic Collect     Status: Normal    Specimen: Throat; Swab   Result Value Ref Range    Group A Strep antigen Negative Negative       I personally reviewed these results and discussed findings with the patient.    The use of Dragon/foodpanda / hellofood dictation services was used to construct the content of this note; any grammatical errors are non-intentional. Please contact the author directly if you are in need of any clarification.

## 2023-05-15 ENCOUNTER — LAB (OUTPATIENT)
Dept: LAB | Facility: CLINIC | Age: 33
End: 2023-05-15
Payer: COMMERCIAL

## 2023-05-15 DIAGNOSIS — R93.5 ABNORMAL ULTRASOUND OF UTERUS: ICD-10-CM

## 2023-05-15 DIAGNOSIS — Z32.01 POSITIVE PREGNANCY TEST: ICD-10-CM

## 2023-05-15 PROCEDURE — 36415 COLL VENOUS BLD VENIPUNCTURE: CPT

## 2023-05-15 PROCEDURE — 84702 CHORIONIC GONADOTROPIN TEST: CPT

## 2023-05-16 ENCOUNTER — VIRTUAL VISIT (OUTPATIENT)
Dept: FAMILY MEDICINE | Facility: CLINIC | Age: 33
End: 2023-05-16
Payer: COMMERCIAL

## 2023-05-16 DIAGNOSIS — R93.5 ABNORMAL ULTRASOUND OF UTERUS: ICD-10-CM

## 2023-05-16 DIAGNOSIS — Z32.01 POSITIVE PREGNANCY TEST: Primary | ICD-10-CM

## 2023-05-16 LAB — HCG INTACT+B SERPL-ACNC: 618 MIU/ML

## 2023-05-16 PROCEDURE — 99213 OFFICE O/P EST LOW 20 MIN: CPT | Mod: 95 | Performed by: FAMILY MEDICINE

## 2023-05-16 NOTE — PROGRESS NOTES
Assessment/Plan:    Positive pregnancy test  Abnormal ultrasound of uterus  Discussed unclear exactly what is going on at this time. Given unsure LMP pt could be very early pregnant or missed miscarriage or molar pregnancy. Will recheck HCG level to observe trend - if increasing then would consider repeat US and further HCG monitoring, if decreasing would discuss options for miscarriage management.   - hCG Quantitative Pregnancy       Phone call duration:  19 minutes    Follow up: pending test results    Dee Davidson MD  [facility]      Subjective:   Marisol Licea is a 32 year old female had telephone visit today for follow-up     -pt messaged on 5/10 that she had positive pregnancy test at home - saw Dr Sanchez that day, positive UPT in clinic  -LMP unclear - had negative UPT on 3/9 before surgery, thinks may have had a period around/after 4/10  -had US on 5/11 with result as below  1.  No evidence for viable intrauterine or extra uterine gestation.  2.  Endometrium is abnormally thickened to 3.1 cm with heterogeneous texture. Findings may be due to failed early pregnancy. Molar pregnancy not excluded. Clinical correlation and ultrasound follow-up recommended.    -HCG level from yesterday at 618  -pt reports no bleeding or cramping at this time  -saw message regarding possible failed early pregnancy or molar pregnancy  -pt states her partner has not been helpful/supportive and this has been hard for her - thankfully her mother has been helpful  -reports mood is ok at the moment because she overall feels shock/that things aren't fully real or that there is still some hope this could progress normally      Exam:  General: Answering questions appropriately, linear thought process, does not sound short of breath, no cough heard    Patient Active Problem List   Diagnosis     ACL tear     PCOS (polycystic ovarian syndrome)     Hemorrhoids, unspecified hemorrhoid type     Cholestasis during  pregnancy     S/P      Past Medical History:   Diagnosis Date     Encounter for induction of labor 2022     Failed induction of labor 2022     S/P  2022     Past Surgical History:   Procedure Laterality Date      SECTION N/A 2022    Procedure:  SECTION;  Surgeon: Essence Perkins MD;  Location: [facility] OR     HEMORRHOIDECTOMY       LAPAROSCOPIC HERNIORRHAPHY UMBILICAL N/A 3/9/2023    Procedure: HERNIORRHAPHY, UMBILICAL, LAPAROSCOPIC;  Surgeon: Sahil Méndez MD;  Location: [facility] OR     Current Outpatient Medications   Medication     hydrOXYzine (ATARAX) 25 MG tablet     valACYclovir (VALTREX) 1000 mg tablet     No current facility-administered medications for this visit.     No Known Allergies  Social History     Socioeconomic History     Marital status: Single     Spouse name: Not on file     Number of children: Not on file     Years of education: Not on file     Highest education level: Not on file   Occupational History     Not on file   Tobacco Use     Smoking status: Former     Packs/day: 0.25     Types: Cigarettes     Quit date: 2022     Years since quittin.2     Passive exposure: Never     Smokeless tobacco: Never   Vaping Use     Vaping status: Never Used   Substance and Sexual Activity     Alcohol use: Not Currently     Drug use: Not Currently     Sexual activity: Yes     Partners: Male   Other Topics Concern     Not on file   Social History Narrative     Not on file     Social Determinants of Health     Financial Resource Strain: Medium Risk (2022)    Overall Financial Resource Strain (CARDIA)      Difficulty of Paying Living Expenses: Somewhat hard   Food Insecurity: No Food Insecurity (2022)    Hunger Vital Sign      Worried About Running Out of Food in the Last Year: Never true      Ran Out of Food in the Last Year: Never true   Transportation Needs: No Transportation Needs (2022)     PRAPARE - Transportation      Lack of Transportation (Medical): No      Lack of Transportation (Non-Medical): No   Physical Activity: Not on file   Stress: Not on file   Social Connections: Not on file   Intimate Partner Violence: Not on file   Housing Stability: Low Risk  (12/22/2022)    Housing Stability Vital Sign      Unable to Pay for Housing in the Last Year: No      Number of Places Lived in the Last Year: 1      Unstable Housing in the Last Year: No     Family History   Problem Relation Age of Onset     No Known Problems Mother      No Known Problems Father      No Known Problems Sister      No Known Problems Brother      Chronic Obstructive Pulmonary Disease Maternal Grandmother      Chronic Obstructive Pulmonary Disease Maternal Grandfather      Other - See Comments Maternal Grandfather         heart condition     Cancer Paternal Grandmother      Myocardial Infarction Paternal Grandfather      Review of systems is as stated in HPI, and the remainder of system review is otherwise negative.

## 2023-05-18 ENCOUNTER — LAB (OUTPATIENT)
Dept: LAB | Facility: CLINIC | Age: 33
End: 2023-05-18
Payer: COMMERCIAL

## 2023-05-18 DIAGNOSIS — Z32.01 POSITIVE PREGNANCY TEST: ICD-10-CM

## 2023-05-18 DIAGNOSIS — R93.5 ABNORMAL ULTRASOUND OF UTERUS: ICD-10-CM

## 2023-05-18 LAB — HCG INTACT+B SERPL-ACNC: 1308 MIU/ML

## 2023-05-18 PROCEDURE — 36415 COLL VENOUS BLD VENIPUNCTURE: CPT

## 2023-05-18 PROCEDURE — 84702 CHORIONIC GONADOTROPIN TEST: CPT

## 2023-05-25 ENCOUNTER — HOSPITAL ENCOUNTER (OUTPATIENT)
Dept: ULTRASOUND IMAGING | Facility: CLINIC | Age: 33
Discharge: HOME | End: 2023-05-25
Attending: FAMILY MEDICINE | Admitting: FAMILY MEDICINE
Payer: COMMERCIAL

## 2023-05-25 DIAGNOSIS — Z32.01 POSITIVE PREGNANCY TEST: ICD-10-CM

## 2023-05-25 PROCEDURE — 76801 OB US < 14 WKS SINGLE FETUS: CPT

## 2023-05-26 DIAGNOSIS — Z34.80 SUPERVISION OF OTHER NORMAL PREGNANCY, ANTEPARTUM: Primary | ICD-10-CM

## 2023-05-26 DIAGNOSIS — O36.8390 BRADYCARDIA FOUND ON MEASUREMENT OF BASELINE FETAL HEART RATE: ICD-10-CM

## 2023-05-28 ENCOUNTER — MYC MEDICAL ADVICE (OUTPATIENT)
Dept: FAMILY MEDICINE | Facility: CLINIC | Age: 33
End: 2023-05-28
Payer: COMMERCIAL

## 2023-05-28 DIAGNOSIS — O21.9 NAUSEA AND VOMITING DURING PREGNANCY: Primary | ICD-10-CM

## 2023-05-30 RX ORDER — ONDANSETRON 4 MG/1
4 TABLET, ORALLY DISINTEGRATING ORAL EVERY 8 HOURS PRN
Qty: 20 TABLET | Refills: 1 | Status: SHIPPED | OUTPATIENT
Start: 2023-05-30 | End: 2023-06-20

## 2023-06-02 ENCOUNTER — HOSPITAL ENCOUNTER (OUTPATIENT)
Dept: ULTRASOUND IMAGING | Facility: CLINIC | Age: 33
Discharge: HOME | End: 2023-06-02
Attending: FAMILY MEDICINE | Admitting: FAMILY MEDICINE
Payer: COMMERCIAL

## 2023-06-02 DIAGNOSIS — Z34.80 SUPERVISION OF OTHER NORMAL PREGNANCY, ANTEPARTUM: ICD-10-CM

## 2023-06-02 DIAGNOSIS — O36.8390 BRADYCARDIA FOUND ON MEASUREMENT OF BASELINE FETAL HEART RATE: ICD-10-CM

## 2023-06-02 PROCEDURE — 76801 OB US < 14 WKS SINGLE FETUS: CPT

## 2023-06-08 ENCOUNTER — TELEPHONE (OUTPATIENT)
Dept: FAMILY MEDICINE | Facility: CLINIC | Age: 33
End: 2023-06-08

## 2023-06-08 NOTE — TELEPHONE ENCOUNTER
Reason for Call:  Appointment Request    Patient requesting this type of appt:  Pregnancy     Requested provider: Dee Davidson    Reason patient unable to be scheduled: Needs to be scheduled by clinic    When does patient want to be seen/preferred time: Whenever    Comments: Pt looking to schedule first OB apt with [facility].     Could we send this information to you in Teranode or would you prefer to receive a phone call?:   Patient would prefer a phone call   Okay to leave a detailed message?: Yes at Cell number on file:    Telephone Information:   Mobile [phone]       Call taken on 6/8/2023 at 9:49 AM by Essence Cordoba

## 2023-06-14 ENCOUNTER — NURSE TRIAGE (OUTPATIENT)
Dept: FAMILY MEDICINE | Facility: CLINIC | Age: 33
End: 2023-06-14
Payer: COMMERCIAL

## 2023-06-14 ENCOUNTER — MYC MEDICAL ADVICE (OUTPATIENT)
Dept: FAMILY MEDICINE | Facility: CLINIC | Age: 33
End: 2023-06-14
Payer: COMMERCIAL
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
## 2023-06-14 NOTE — TELEPHONE ENCOUNTER
Please see Nurse Triage from today 6/14/23 for details.    TONY CornellN, RN  [facility]

## 2023-06-19 PROBLEM — Z87.19 HISTORY OF CHOLESTASIS DURING PREGNANCY: Status: ACTIVE | Noted: 2023-06-19

## 2023-06-19 PROBLEM — Z87.59 HISTORY OF CHOLESTASIS DURING PREGNANCY: Status: ACTIVE | Noted: 2023-06-19

## 2023-06-19 PROBLEM — O26.649 CHOLESTASIS DURING PREGNANCY: Status: RESOLVED | Noted: 2022-12-23 | Resolved: 2023-06-19

## 2023-06-19 NOTE — PROGRESS NOTES
Clinic Note - Initial OB Visit    Ileana Licea is a 32 year old  female who presents to clinic for a new OB visit.      Estimated Date of Delivery: 2024 via 1st tri US - 9+4 wks today    She has had bleeding since her LMP - minimal faint red bleeding, when wiped, maybe related to sexual activity, now resolved (none for 2 days). She has not had any abdominal pain or cramping since her LMP. She has had mild nausea. This was not a planned pregnancy. Feeling anxious and depressed - lots of stress.     Pre Term Labor Risk Assessment  Is the patient's age <18 or >40? No  Patint's BMI is Body mass index is 31.15 kg/m .. Does patient have a BMI < 18.5? No  If previous pregnancy, was delivery within previous 6 months? Yes - prior c/s 22  Have you ever delivered a baby prior to 37 weeks gestation? Yes - d/t cholestasis  Did conception for this pregnancy occur via In Vitro Fertilization? No  Summary: Patient is at increased risk for  labor given short interpregnancy interval    Patient Active Problem List   Diagnosis     ACL tear     PCOS (polycystic ovarian syndrome)     Hemorrhoids, unspecified hemorrhoid type     S/P      History of cholestasis during pregnancy       OB History    Para Term  AB Living   2 1 0 1 0 1   SAB IAB Ectopic Multiple Live Births   0 0 0 0 1      # Outcome Date GA Lbr Joselito/2nd Weight Sex Delivery Anes PTL Lv   2 Current            1  22 36w5d  3.75 kg (8 lb 4.3 oz) M CS-LTranv EPI  PEDRO      Complications: Failure to Progress in First Stage      Name: LAVERNE LICEA-ILEANA      Apgar1: 9  Apgar5: 9       Past Medical History:   Diagnosis Date     Encounter for induction of labor 2022     Failed induction of labor 2022     S/P  2022       Past Surgical History:   Procedure Laterality Date      SECTION N/A 2022    Procedure:  SECTION;  Surgeon: Essence Perkins MD;   Location: [facility] OR     HEMORRHOIDECTOMY       LAPAROSCOPIC HERNIORRHAPHY UMBILICAL N/A 3/9/2023    Procedure: HERNIORRHAPHY, UMBILICAL, LAPAROSCOPIC;  Surgeon: Sahil Méndez MD;  Location: [facility] Main OR       Current Outpatient Medications   Medication Sig Dispense Refill     hydrOXYzine (ATARAX) 25 MG tablet Take 1-2 tablets (25-50 mg) by mouth nightly as needed (insomnia, sleep issues) 60 tablet 1     Magnesium Oxide, Laxative, (LAX) 500 MG TABS tablet Take 400 mg by mouth daily       ondansetron (ZOFRAN ODT) 4 MG ODT tab Take 1 tablet (4 mg) by mouth every 8 hours as needed for nausea 20 tablet 3     Prenatal Vit-Fe Fumarate-FA (PRENATAL MULTIVITAMIN W/IRON) 27-0.8 MG tablet Take 1 tablet by mouth daily       sertraline (ZOLOFT) 25 MG tablet Take 1 tablet (25 mg) by mouth daily 90 tablet 1     valACYclovir (VALTREX) 1000 mg tablet Take 2 tablets (2,000 mg) by mouth 2 times daily for 1 day 4 tablet 0       Do you have a history of any of the following medical conditions?  Condition Yes/No   Hypertension No   Heart disease, mitral valve prolapse, rheumatic fever No   Asthma or another chronic lung disease No   An autoimmune disorder No   Kidney disease No   Frequent urinary tract infections No   Migraine headaches No   Stroke, loss of sensation/function, seizures, or other neuro problem No   Diabetes No   Thyroid problems or have you taken thyroid medication No   Hepatitis, liver disease, jaundice No   Blood clots, phlebitis, pulmonary embolism or varicose veins No   Excessive bleeding after surgery or dental work No   Heavy menstrual periods requiring treatment No   Anemia No   Blood transfusions No        Would you refuse a blood transfusion? No     Prenatal Genetic Screening  Do you have a history of any of the following Yes/No        A metabolic disorder (e.g. Insulin-dependent DM, PKU) No        Recurrent pregnancy loss No     Do you, the baby's father, or anyone in your families have  Yes/No        Thalassemia AND MCV <80 No        Hemophilia No        Neural tube defect No        Congenital heart defect No        Sickle cell disease or trait No        Muscular dystrophy No        Cystic fibrosis No        Mental retardation or autism No        Down's syndrome No        Perico-Sach's disease No        Berrien's chorea No        Any other inherited genetic or chromosomal disorder No        A child with birth defects not listed above No   FOB none    Infection History  At high risk for coming in contact with HIV No   Ever treated for tuberculosis No   Ever received the BCG vaccine for tuberculosis No   Ever had genital herpes (or has your partner) No   Had a rash or viral illness since LMP No   Ever had a sexually transmitted infection No   Ever had chicken pox or the vaccine Yes   Ever had any other serious infectious disease No   Up to date with immunizations Yes      PERSONAL/SOCIAL HISTORY  Social History     Socioeconomic History     Marital status: Single   Tobacco Use     Smoking status: Former     Packs/day: 0.25     Types: Cigarettes     Quit date: 2022     Years since quittin.3     Passive exposure: Never     Smokeless tobacco: Never   Vaping Use     Vaping status: Never Used   Substance and Sexual Activity     Alcohol use: Not Currently     Drug use: Not Currently     Sexual activity: Yes     Partners: Male     Social Determinants of Health     Financial Resource Strain: Medium Risk (2022)    Overall Financial Resource Strain (CARDIA)      Difficulty of Paying Living Expenses: Somewhat hard   Food Insecurity: No Food Insecurity (2022)    Hunger Vital Sign      Worried About Running Out of Food in the Last Year: Never true      Ran Out of Food in the Last Year: Never true   Transportation Needs: No Transportation Needs (2022)    PRAPARE - Transportation      Lack of Transportation (Medical): No      Lack of Transportation (Non-Medical): No   Housing Stability:  "Low Risk  (2022)    Housing Stability Vital Sign      Unable to Pay for Housing in the Last Year: No      Number of Places Lived in the Last Year: 1      Unstable Housing in the Last Year: No     Have you used any tobacco products, alcohol or any other drugs during this pregnancy before or after your knew you were pregnant? no    REVIEW OF SYSTEMS  C: NEGATIVE for fever, chills, change in weight  E: NEGATIVE for vision changes or irritation  ENT: NEGATIVE for ear, mouth and throat problems  R: NEGATIVE for significant cough or SOB  B: NEGATIVE for masses, tenderness or discharge  CV: NEGATIVE for chest pain, palpitations or peripheral edema  GI: NEGATIVE for abdominal pain, heartburn, or change in bowel habits  : NEGATIVE for unusual urinary or vaginal symptoms.   M: NEGATIVE for significant arthralgias or myalgia  N: NEGATIVE for weakness, dizziness or paresthesias  P: NEGATIVE for changes in mood or affect    PHYSICAL EXAM:  /58 (BP Location: Left arm, Patient Position: Sitting, Cuff Size: Adult Regular)   Pulse 80   Temp 98.9  F (37.2  C) (Temporal)   Resp 16   Ht 1.644 m (5' 4.72\")   Wt 84.2 kg (185 lb 9.6 oz)   LMP  (LMP Unknown)   SpO2 95%   BMI 31.15 kg/m     GENERAL:  Pleasant pregnant female, alert, well groomed.  SKIN:  Warm and dry, without lesions or rashes  MOUTH:  Buccal mucosa pink, moist without lesions.    LUNGS: breathing comfortably on room air.  HEART:  RRR without murmur.  ABDOMEN: Soft without masses , tenderness or organomegaly. Fundus palpable at symphysis pubis. Active cardiac movement on handheld US  MUSCULOSKELETAL:  Full range of motion.  EXTREMITIES:  No edema. No significant varicosities.     ASSESSMENT/PLAN  Supervision of other normal pregnancy, antepartum   at 9+4 weeks today. Pregnancy complicated by conditions as below. Will check labs on Friday at 10 weeks gestation - invitae ordered as well.   - Wet prep - Clinic Collect  - Neisseria gonorrhoeae " PCR  - Chlamydia trachomatis PCR  - Chlamydia trachomatis PCR  - Neisseria gonorrhoeae PCR  - ABO/Rh type and screen  - Hepatitis B surface antigen  - CBC with platelets  - HIV Antigen Antibody Combo  - Rubella Antibody IgG  - Treponema Abs w Reflex to RPR and Titer  - Urine Culture Aerobic Bacterial  - UA with Micro  - Invitae Non-Invasive Prenatal Screening    History of cholestasis during pregnancy  Hx cholestasis with last pregnancy - discussed potential for recurrence this pregnancy (60-70%), will check labs if pt develops itching.    Hx of  section  Hx  for failed induction for cholestasis, plan referral to OBGYN later in pregnancy to discuss repeat  vs TOLAC - discussed likely recommendation for repeat c/s given short interpregnancy interval, pt ok with this.    Hx of  delivery, currently pregnant  Prior delivery at 36+4 weeks d/t cholestasis, no indication for progesterone.     Obesity affecting pregnancy, antepartum  Pregravid BMI 30.71 - goal weight gain <20 lbs. Will check vitamin D and A1c.     Generalized anxiety disorder  Depression affecting pregnancy  Hx anxiety/depression - worsened recently d/t pregnancy/postpartum and life stressors. Recommend starting selective serotonin reuptake inhibitor at this time and pt agrees - will start zoloft as below, discussed safety during pregnancy and breastfeeding - pt can increase to 50mg daily in 2 weeks if needed, further increases to be done together. Could consider therapy referral in future if needed.   - sertraline (ZOLOFT) 25 MG tablet  Dispense: 90 tablet; Refill: 1    Nausea and vomiting during pregnancy  Managed well with zofran, refilled today.  - ondansetron (ZOFRAN ODT) 4 MG ODT tab  Dispense: 20 tablet; Refill: 3    Cervical cancer screening  Due for pap smear, completed today.   - Pap screen with HPV - recommended age 30 - 65 years     - Routine prenatal labs today. CBC, type and screen, rubella, HIV,  gonorrhea/chlamydia, RPR, hepatitis B, urinalysis with culture.   - Pap smear done today  - Early ultrasound for dating already completed.     Referral(s): OBGYN to discuss repeat  vs TOLAC - discussed likely recommendation for repeat c/s given short interpregnancy interval, pt ok with this    Discussed:  -Recommended weight gain of <20 lbs for BMI of Body mass index is 31.15 kg/m ..  -Genetic screening options, including false positive rate of 5% with screening tests and diagnostic options (chorionic villus sampling, amniocentesis), and patient desires - invitae ordered, pt will return on Friday for lab only visit at 10 weeks gestation  -Safe medications during pregnancy. Is currently taking prenatal vitamin daily.   -Healthy habits including not using tobacco or alcohol, exercising regularly and maintaining healthy diet  -Information given on tips for dealing with nausea, healthy habits, exposures, safety, prenatal appointment schedule, and when to call the doctor.  -Recommendations for breastfeeding.    Will follow up in 4 weeks for routine pre- care.    Dee Davidson MD  [facility]

## 2023-06-19 NOTE — PATIENT INSTRUCTIONS
Mood:  -start sertraline (zoloft) 25mg daily  -can increase to 50mg (2 tablets) after 2 weeks if needed and no side effects    Tips to Relieve Nausea  Although nausea can occur at any time of the day, it may be worse in the morning. To help prevent nausea:  Eat small, light meals at frequent intervals.  Get up slowly. Eat a few unsalted crackers before you get out of bed.  Drink water or 7-Up to soothe your stomach.  Eat an ice pop in your favorite flavor.  Ask your health care provider about taking bobby or vitamin B6 for nausea and vomiting.  Talk with your health care provider if you take vitamins that upset your stomach.  Safe Medications  Take one prenatal vitamin with at least 400 mcg of folic acid daily.  Use acetaminophen (Tylenol) for pain.   Try Maalox or Tums for heartburn. If these are not working, talk to your doctor about trying a different medication.  Diphenhydramine (Benadryl) can also be used safely in pregnancy.  Talk to your doctor about any other medications or vitamins you are taking!  Start Healthy Habits Now  What matters most is protecting your baby from this moment on. If you smoke, drink alcohol, or use drugs, now is the time to stop. If you need help, talk with your health care provider.  Smoking increases the risk of miscarriage or having a low-birth-weight baby. If you smoke, quit now.  Alcohol and drugs have been linked with miscarriage, birth defects, intellectual disability, and low birth weight. Do not drink alcohol or take drugs.  Continue your current exercise routine, or start one with walking, swimming, and other low impact exercises. Yoga specifically designed for pregnant moms is a great stress and pain reliever. Keep your self well hydrated and avoid overheating with all activity. If bleeding, fluid leakage, or cramping/contractions occur, stop the exercise and call your doctor.   Wear your seatbelt every time you are in the car. Fasten the lap part underneath your growing  belly and the shoulder part as you normally would.   Weight Gain  Add an additional 300 to 400 calories a day into your diet.  Goal weight gain <20 lbs  Talk to your doctor if you are concerned about weight gain.   Keep Your Environment Save  You can still clean house and use scented products. Just take some simple precautions:  Wear gloves when using cleaning fluids.  Open windows to let in fresh air. Use a fan if you paint.  Avoid secondhand smoke.  Don t breathe fumes from nail polish, hair spray, cleansers, or other chemicals.  Work Concerns  The end of the first trimester is a good time to discuss working during pregnancy with your employer. Follow your health care provider s advice if your job requires you to stand for a long time, work with hazardous tools, or even sit at a desk all day. Your workspace, workload, or scheduled hours may need to be adjusted - perhaps you can change body postures more often or take an extra break.  Intimacy  Unless your health care provider tells you to, there is no reason to stop having sex while you re pregnant. You or your partner may notice changes in desire. Desire may be less in the first trimester, due to nausea and fatigue. In the second trimester, sex may be very enjoyable. The third trimester can be a challenge comfort-wise. Try different positions and see what s best for you both. As always, let your doctor know if you experience any bleeding, leakage of fluids, or cramping/contractions.  Other Pregnancy Concerns  Limit the amount of radiation you are exposed to. Always tell the radiology technologist that you are pregnant if having an xray or CT scan done.   Practice good hand washing to prevent infection.  Avoid cat litter.   Wash all fruits and vegetabes. Always cook meat thoroughly and do not eat raw fish. Do not eat more than 6 to 12 ounces of other fish sources.   Do not eat soft cheeses.  Limit caffeine to less than 200 milligrams a days. The average cup of  coffee as approximately 120 milligrams of caffeine.     Nonprescription Medications Thought To Be Safe In Pregnancy (take medication according to package directions)    NAUSEA AND MOTION SICKNESS   Vitamin C 500 mg, once a day with food   Vitamin B6 50 mg, one three times a day   Unisom tablets (not gel tabs) - 1/2 to 1 tab at bedtime; may also take 1/2 tab in the morning and mid-afternoon   Dramamine   Sea Bands   Zaria tablets    CONGESTION AND COLDS   Chlortrimeton   Benadryl   Vicks Vapor Rub   Cough Drops  Avoid Robitussin and Mucinex in 1st trimester but otherwise safe during rest of pregnancy  Avoid pseudoephedrine     ALLERGIES   Alavert   Claritin   Tavist   Benadryl   Zyrtec    HEADACHES   Tylenol 325 mg 2-3 four times a day   Tylenol 500 mg 1-2 four times a day   DO NOT EXCEED 4,000 MG A DAY  DO NOT TAKE IBUPROFEN, MOTRIN, ADVIL, ASPIRIN, OR ALEVE     VAGINAL YEAST INFECTION   Monistat 3 or 7   Gyne-Lotrimin    HEMORRHOIDS   Preparation-H   Anusol   Anusol HC    HEARTBURN   Tums   Maalox (tablets or liquid)   Rolaids   Mylanta   Gaviscon   Pepcid AC  NO PEPTOBISMOL OR ALKASELTZER (contains aspirin)     DIARRHEA (do not treat for the first 24-48 hrs)   Kaopectate   Imodium AD    CONSTIPATION   Colace (Docusate Sodium) - stool softener   Aye-Colace (Colace + mild stimulant)   Any fiber supplement (Metamucil, Fibercon ,etc.)    GAS   Gas-X   Mylanta II with Simethicone   Mylicon    INSECT BITES   Lotions: Calamine, Caladryl, Benadryl   Oral: Benadryl tabs 25-50mg (every 6-8hrs)

## 2023-06-20 ENCOUNTER — PRENATAL OFFICE VISIT (OUTPATIENT)
Dept: FAMILY MEDICINE | Facility: CLINIC | Age: 33
End: 2023-06-20
Payer: COMMERCIAL

## 2023-06-20 VITALS
WEIGHT: 185.6 LBS | BODY MASS INDEX: 30.92 KG/M2 | HEART RATE: 80 BPM | SYSTOLIC BLOOD PRESSURE: 100 MMHG | DIASTOLIC BLOOD PRESSURE: 58 MMHG | HEIGHT: 65 IN | RESPIRATION RATE: 16 BRPM | TEMPERATURE: 98.9 F | OXYGEN SATURATION: 95 %

## 2023-06-20 DIAGNOSIS — Z87.19 HISTORY OF CHOLESTASIS DURING PREGNANCY: ICD-10-CM

## 2023-06-20 DIAGNOSIS — O09.899 HX OF PRETERM DELIVERY, CURRENTLY PREGNANT: ICD-10-CM

## 2023-06-20 DIAGNOSIS — Z12.4 CERVICAL CANCER SCREENING: ICD-10-CM

## 2023-06-20 DIAGNOSIS — Z34.80 SUPERVISION OF OTHER NORMAL PREGNANCY, ANTEPARTUM: Primary | ICD-10-CM

## 2023-06-20 DIAGNOSIS — O99.210 OBESITY AFFECTING PREGNANCY, ANTEPARTUM: ICD-10-CM

## 2023-06-20 DIAGNOSIS — B37.31 YEAST INFECTION OF THE VAGINA: ICD-10-CM

## 2023-06-20 DIAGNOSIS — Z98.891 HX OF CESAREAN SECTION: ICD-10-CM

## 2023-06-20 DIAGNOSIS — Z87.59 HISTORY OF CHOLESTASIS DURING PREGNANCY: ICD-10-CM

## 2023-06-20 DIAGNOSIS — F41.1 GENERALIZED ANXIETY DISORDER: ICD-10-CM

## 2023-06-20 DIAGNOSIS — O99.340 DEPRESSION AFFECTING PREGNANCY: ICD-10-CM

## 2023-06-20 DIAGNOSIS — F32.A DEPRESSION AFFECTING PREGNANCY: ICD-10-CM

## 2023-06-20 DIAGNOSIS — O21.9 NAUSEA AND VOMITING DURING PREGNANCY: ICD-10-CM

## 2023-06-20 LAB
CLUE CELLS: ABNORMAL
TRICHOMONAS, WET PREP: ABNORMAL
WBC'S/HIGH POWER FIELD, WET PREP: ABNORMAL
YEAST, WET PREP: PRESENT

## 2023-06-20 PROCEDURE — G0145 SCR C/V CYTO,THINLAYER,RESCR: HCPCS | Performed by: FAMILY MEDICINE

## 2023-06-20 PROCEDURE — 87624 HPV HI-RISK TYP POOLED RSLT: CPT | Performed by: FAMILY MEDICINE

## 2023-06-20 PROCEDURE — 87591 N.GONORRHOEAE DNA AMP PROB: CPT | Performed by: FAMILY MEDICINE

## 2023-06-20 PROCEDURE — 87210 SMEAR WET MOUNT SALINE/INK: CPT | Performed by: FAMILY MEDICINE

## 2023-06-20 PROCEDURE — 99214 OFFICE O/P EST MOD 30 MIN: CPT | Performed by: FAMILY MEDICINE

## 2023-06-20 PROCEDURE — 87491 CHLMYD TRACH DNA AMP PROBE: CPT | Performed by: FAMILY MEDICINE

## 2023-06-20 RX ORDER — ONDANSETRON 4 MG/1
4 TABLET, ORALLY DISINTEGRATING ORAL EVERY 8 HOURS PRN
Qty: 20 TABLET | Refills: 3 | Status: SHIPPED | OUTPATIENT
Start: 2023-06-20 | End: 2023-07-18

## 2023-06-20 RX ORDER — SERTRALINE HYDROCHLORIDE 25 MG/1
25 TABLET, FILM COATED ORAL DAILY
Qty: 90 TABLET | Refills: 1 | Status: SHIPPED | OUTPATIENT
Start: 2023-06-20 | End: 2023-09-25

## 2023-06-20 RX ORDER — CLOTRIMAZOLE 1 %
1 CREAM WITH APPLICATOR VAGINAL AT BEDTIME
Qty: 1 G | Refills: 0 | Status: SHIPPED | OUTPATIENT
Start: 2023-06-20 | End: 2023-06-27

## 2023-06-20 RX ORDER — PRENATAL VIT/IRON FUM/FOLIC AC 27MG-0.8MG
1 TABLET ORAL DAILY
COMMUNITY

## 2023-06-20 ASSESSMENT — PAIN SCALES - GENERAL: PAINLEVEL: NO PAIN (0)

## 2023-06-21 LAB
C TRACH DNA SPEC QL NAA+PROBE: NEGATIVE
N GONORRHOEA DNA SPEC QL NAA+PROBE: NEGATIVE

## 2023-06-22 LAB
ABO/RH(D): NORMAL
ANTIBODY SCREEN: NEGATIVE
SPECIMEN EXPIRATION DATE: NORMAL

## 2023-06-23 ENCOUNTER — LAB (OUTPATIENT)
Dept: LAB | Facility: CLINIC | Age: 33
End: 2023-06-23
Payer: COMMERCIAL

## 2023-06-23 DIAGNOSIS — O99.210 OBESITY AFFECTING PREGNANCY, ANTEPARTUM: ICD-10-CM

## 2023-06-23 DIAGNOSIS — Z34.80 SUPERVISION OF OTHER NORMAL PREGNANCY, ANTEPARTUM: ICD-10-CM

## 2023-06-23 LAB
ALBUMIN UR-MCNC: NEGATIVE MG/DL
APPEARANCE UR: CLEAR
BILIRUB UR QL STRIP: NEGATIVE
BKR LAB AP GYN ADEQUACY: NORMAL
BKR LAB AP GYN INTERPRETATION: NORMAL
BKR LAB AP HPV REFLEX: NORMAL
BKR LAB AP PREVIOUS ABNORMAL: NORMAL
COLOR UR AUTO: YELLOW
ERYTHROCYTE [DISTWIDTH] IN BLOOD BY AUTOMATED COUNT: 12 % (ref 10–15)
GLUCOSE UR STRIP-MCNC: NEGATIVE MG/DL
HBA1C MFR BLD: 4.9 % (ref 0–5.6)
HCT VFR BLD AUTO: 34.4 % (ref 35–47)
HGB BLD-MCNC: 12 G/DL (ref 11.7–15.7)
HGB UR QL STRIP: ABNORMAL
KETONES UR STRIP-MCNC: NEGATIVE MG/DL
LEUKOCYTE ESTERASE UR QL STRIP: ABNORMAL
MCH RBC QN AUTO: 30.5 PG (ref 26.5–33)
MCHC RBC AUTO-ENTMCNC: 34.9 G/DL (ref 31.5–36.5)
MCV RBC AUTO: 87 FL (ref 78–100)
NITRATE UR QL: NEGATIVE
PATH REPORT.COMMENTS IMP SPEC: NORMAL
PATH REPORT.COMMENTS IMP SPEC: NORMAL
PATH REPORT.RELEVANT HX SPEC: NORMAL
PH UR STRIP: 6 [PH] (ref 5–8)
PLATELET # BLD AUTO: 282 10E3/UL (ref 150–450)
RBC # BLD AUTO: 3.94 10E6/UL (ref 3.8–5.2)
SP GR UR STRIP: 1.02 (ref 1–1.03)
UROBILINOGEN UR STRIP-ACNC: 0.2 E.U./DL
WBC # BLD AUTO: 5.9 10E3/UL (ref 4–11)

## 2023-06-23 PROCEDURE — 86850 RBC ANTIBODY SCREEN: CPT

## 2023-06-23 PROCEDURE — 86901 BLOOD TYPING SEROLOGIC RH(D): CPT

## 2023-06-23 PROCEDURE — 87389 HIV-1 AG W/HIV-1&-2 AB AG IA: CPT

## 2023-06-23 PROCEDURE — 86780 TREPONEMA PALLIDUM: CPT

## 2023-06-23 PROCEDURE — 81003 URINALYSIS AUTO W/O SCOPE: CPT

## 2023-06-23 PROCEDURE — 82306 VITAMIN D 25 HYDROXY: CPT

## 2023-06-23 PROCEDURE — 83036 HEMOGLOBIN GLYCOSYLATED A1C: CPT

## 2023-06-23 PROCEDURE — 86762 RUBELLA ANTIBODY: CPT

## 2023-06-23 PROCEDURE — 87086 URINE CULTURE/COLONY COUNT: CPT

## 2023-06-23 PROCEDURE — 86900 BLOOD TYPING SEROLOGIC ABO: CPT

## 2023-06-23 PROCEDURE — 36415 COLL VENOUS BLD VENIPUNCTURE: CPT

## 2023-06-23 PROCEDURE — 87340 HEPATITIS B SURFACE AG IA: CPT

## 2023-06-23 PROCEDURE — 85027 COMPLETE CBC AUTOMATED: CPT

## 2023-06-24 LAB
DEPRECATED CALCIDIOL+CALCIFEROL SERPL-MC: 33 UG/L (ref 20–75)
HBV SURFACE AG SERPL QL IA: NONREACTIVE
HIV 1+2 AB+HIV1 P24 AG SERPL QL IA: NONREACTIVE
T PALLIDUM AB SER QL: NONREACTIVE

## 2023-06-25 LAB — BACTERIA UR CULT: NORMAL

## 2023-06-26 LAB
RUBV IGG SERPL QL IA: 2.9 INDEX
RUBV IGG SERPL QL IA: POSITIVE

## 2023-06-27 ENCOUNTER — PATIENT OUTREACH (OUTPATIENT)
Dept: FAMILY MEDICINE | Facility: CLINIC | Age: 33
End: 2023-06-27
Payer: COMMERCIAL

## 2023-06-27 LAB
HUMAN PAPILLOMA VIRUS 16 DNA: NEGATIVE
HUMAN PAPILLOMA VIRUS 18 DNA: NEGATIVE
HUMAN PAPILLOMA VIRUS FINAL DIAGNOSIS: ABNORMAL
HUMAN PAPILLOMA VIRUS OTHER HR: POSITIVE

## 2023-06-30 LAB — SCANNED LAB RESULT: NORMAL

## 2023-07-07 ENCOUNTER — MYC MEDICAL ADVICE (OUTPATIENT)
Dept: FAMILY MEDICINE | Facility: CLINIC | Age: 33
End: 2023-07-07
Payer: COMMERCIAL

## 2023-07-07 DIAGNOSIS — Z34.00 SUPERVISION OF NORMAL FIRST PREGNANCY, ANTEPARTUM: ICD-10-CM

## 2023-07-07 NOTE — TELEPHONE ENCOUNTER
Writer pended a refill of hydroxyzine for the patient.    Thelma Monroy RN, BSN  [facility]

## 2023-07-07 NOTE — TELEPHONE ENCOUNTER
Routing refill request to provider for review/approval because:  Drug interaction warning: High risk for lactating mothers    Pending Prescriptions:                       Disp   Refills    hydrOXYzine (ATARAX) 25 MG tablet          60 tab*8        Sig: Take 1-2 tablets (25-50 mg) by mouth nightly as           needed (insomnia, sleep issues)    Last Written Prescription Date:  5/25/2023  Last Fill Quantity: 60 tabs,  # refills: 1   Last office visit with prescribing provider: 5/10/2023   Future Office Visit:   Appointments in Next Year      Jul 18, 2023 11:20 AM  (Arrive by 11:10 AM)  Return OB Visit with Dee Davidson MD  [facility] ([facility]) [phone]          Thelma Monroy RN, BSN  [facility]

## 2023-07-10 RX ORDER — HYDROXYZINE HYDROCHLORIDE 25 MG/1
25-50 TABLET, FILM COATED ORAL
Qty: 60 TABLET | Refills: 8 | Status: SHIPPED | OUTPATIENT
Start: 2023-07-10 | End: 2023-08-15

## 2023-07-16 NOTE — PATIENT INSTRUCTIONS
Phone Numbers:  [facility]: [phone]  [facility]: [phone]    When to call or come in to the hospital   If you have any bleeding or leakage of fluids.   If you have uterine cramping that does not resolve with rest and fluids.  If you have a headache not resolved with tylenol, right upper abdominal pain, or sudden onset of swelling.  You know your body best. Never hesitate to call or go to the hospital if something doesn't feel right!    Genetic Screening  Sampling of your blood to screen for genetic abnormalities, like Down's syndrome, in your baby  Screening test only - further testing, like advanced ultrasound or amniocentesis, would be needed to confirm positive test  Recommended for mothers over age 35, history of genetic abnormalities - but offered to all pregnant women.  Talk to your doctor if you have further questions, or are interested in this screening test.     Breastfeeding  Breast feeding is best, for you and baby!   Recommendations are for exclusive breastfeeding for babies first 6 months of life.  Talk to your doctor if you have more questions about breastfeeding your baby.    Other Pregnancy Concerns  Continue to take a prenatal vitamin daily  Maintain an active, healthy lifestyle.   If this is your first baby, you can expect to start feeling movement at 20-24 weeks gestation. If this is your second or more pregnancy, you will generally start feeling movement at 18-22 weeks gestation.   [facility] parenting classes [email or web address]  [facility] parenting classes [email or web address]  Free online classes through [employer]

## 2023-07-16 NOTE — PROGRESS NOTES
"Clinic Note - Return OB Visit    Ileana Licea is a 32 year old  female who presents to clinic for a follow up OB visit. She is currently 13w4d with an estimated date of delivery of 2024 via  [facility]. She denies headache, chest pain, shortness of breath, abdominal pain/contractions, vaginal bleeding, or abnormal discharge. She has not felt baby move.     New concerns today:   -still some nausea - improving, not needing zofran as much  -abd pain right around belly button - better sitting/lying down and pushing in area, better in morning - bothering her quite a bit  -less stress lately    OB History    Para Term  AB Living   2 1 0 1 0 1   SAB IAB Ectopic Multiple Live Births   0 0 0 0 1      # Outcome Date GA Lbr Joselito/2nd Weight Sex Delivery Anes PTL Lv   2 Current            1  22 36w5d  3.75 kg (8 lb 4.3 oz) M CS-LTranv EPI  PEDRO      Complications: Failure to Progress in First Stage      Name: WINSTONMALE-ILEANA      Apgar1: 9  Apgar5: 9       Physical exam:  /66 (BP Location: Left arm, Patient Position: Sitting, Cuff Size: Adult Regular)   Pulse 81   Temp 98.7  F (37.1  C) (Temporal)   Resp 16   Ht 1.635 m (5' 4.37\")   Wt 84.8 kg (186 lb 14.4 oz)   LMP  (LMP Unknown)   SpO2 98%   BMI 31.71 kg/m      General: alert, female in no acute distress  Abdomen: gravid uterus appropriate for gestation age above pubic symphysis, non-tender, FHTs 150  Extremities: no edema    Supervision of other normal pregnancy, antepartum   at 13+4 weeks today. Pregnancy complicated by conditions as below.     History of cholestasis during pregnancy  Hx cholestasis with last pregnancy - discussed potential for recurrence this pregnancy (60-70%), will check labs if pt develops itching.    Hx of  section  Hx  for failed induction for cholestasis, plan referral to OBGYN later in pregnancy to discuss repeat  vs TOLAC - discussed likely " recommendation for repeat c/s given short interpregnancy interval, pt ok with this.    Hx of  delivery, currently pregnant  Prior delivery at 36+4 weeks d/t cholestasis, no indication for progesterone.    Obesity affecting pregnancy, antepartum  Pregravid BMI 30.71 - total weight gain so far 3 lbs, goal weight gain <20 lbs.    Generalized anxiety disorder  Depression affecting pregnancy  Hx anxiety/depression - worsened recently d/t pregnancy/postpartum and life stressors. Currently taking zoloft 50mg daily.    Hematuria  Recheck UA today.    Abdominal pain, epigastric  Right around belly button, no obvious hernia but possibly or could be related to muscle stretching/DR. Do not feel that this is uterine/OB related. Will check US for hernia.      Reviewed pre-dayne labs: O neg, invitae nml, girl    Follow up in 4 weeks for routine prenatal visit.     Dee Davidson MD  [facility]

## 2023-07-18 ENCOUNTER — PRENATAL OFFICE VISIT (OUTPATIENT)
Dept: FAMILY MEDICINE | Facility: CLINIC | Age: 33
End: 2023-07-18
Payer: COMMERCIAL

## 2023-07-18 VITALS
HEART RATE: 81 BPM | DIASTOLIC BLOOD PRESSURE: 66 MMHG | RESPIRATION RATE: 16 BRPM | OXYGEN SATURATION: 98 % | BODY MASS INDEX: 31.91 KG/M2 | TEMPERATURE: 98.7 F | WEIGHT: 186.9 LBS | SYSTOLIC BLOOD PRESSURE: 100 MMHG | HEIGHT: 64 IN

## 2023-07-18 DIAGNOSIS — O99.340 DEPRESSION AFFECTING PREGNANCY: ICD-10-CM

## 2023-07-18 DIAGNOSIS — F41.1 GENERALIZED ANXIETY DISORDER: ICD-10-CM

## 2023-07-18 DIAGNOSIS — R10.13 ABDOMINAL PAIN, EPIGASTRIC: ICD-10-CM

## 2023-07-18 DIAGNOSIS — Z87.19 HISTORY OF CHOLESTASIS DURING PREGNANCY: ICD-10-CM

## 2023-07-18 DIAGNOSIS — Z87.59 HISTORY OF CHOLESTASIS DURING PREGNANCY: ICD-10-CM

## 2023-07-18 DIAGNOSIS — O21.9 NAUSEA AND VOMITING DURING PREGNANCY: ICD-10-CM

## 2023-07-18 DIAGNOSIS — Z98.891 HX OF CESAREAN SECTION: ICD-10-CM

## 2023-07-18 DIAGNOSIS — O99.210 OBESITY AFFECTING PREGNANCY, ANTEPARTUM: ICD-10-CM

## 2023-07-18 DIAGNOSIS — O09.899 HX OF PRETERM DELIVERY, CURRENTLY PREGNANT: ICD-10-CM

## 2023-07-18 DIAGNOSIS — Z34.80 SUPERVISION OF OTHER NORMAL PREGNANCY, ANTEPARTUM: Primary | ICD-10-CM

## 2023-07-18 DIAGNOSIS — R31.9 HEMATURIA, UNSPECIFIED TYPE: ICD-10-CM

## 2023-07-18 DIAGNOSIS — F32.A DEPRESSION AFFECTING PREGNANCY: ICD-10-CM

## 2023-07-18 LAB
ALBUMIN UR-MCNC: NEGATIVE MG/DL
AMORPH CRY #/AREA URNS HPF: ABNORMAL /HPF
APPEARANCE UR: ABNORMAL
BACTERIA #/AREA URNS HPF: ABNORMAL /HPF
BILIRUB UR QL STRIP: NEGATIVE
COLOR UR AUTO: YELLOW
GLUCOSE UR STRIP-MCNC: NEGATIVE MG/DL
HGB UR QL STRIP: NEGATIVE
KETONES UR STRIP-MCNC: NEGATIVE MG/DL
LEUKOCYTE ESTERASE UR QL STRIP: NEGATIVE
MUCOUS THREADS #/AREA URNS LPF: PRESENT /LPF
NITRATE UR QL: NEGATIVE
PH UR STRIP: 7 [PH] (ref 5–8)
RBC #/AREA URNS AUTO: ABNORMAL /HPF
SP GR UR STRIP: 1.02 (ref 1–1.03)
SQUAMOUS #/AREA URNS AUTO: ABNORMAL /LPF
UROBILINOGEN UR STRIP-ACNC: 0.2 E.U./DL
WBC #/AREA URNS AUTO: ABNORMAL /HPF

## 2023-07-18 PROCEDURE — 99207 PR PRENATAL VISIT: CPT | Performed by: FAMILY MEDICINE

## 2023-07-18 PROCEDURE — 81001 URINALYSIS AUTO W/SCOPE: CPT | Performed by: FAMILY MEDICINE

## 2023-07-18 RX ORDER — ONDANSETRON 4 MG/1
4 TABLET, ORALLY DISINTEGRATING ORAL EVERY 8 HOURS PRN
Qty: 20 TABLET | Refills: 3 | Status: SHIPPED | OUTPATIENT
Start: 2023-07-18 | End: 2023-08-08

## 2023-07-18 ASSESSMENT — PAIN SCALES - GENERAL: PAINLEVEL: MODERATE PAIN (4)

## 2023-07-20 ENCOUNTER — OFFICE VISIT (OUTPATIENT)
Dept: FAMILY MEDICINE | Facility: CLINIC | Age: 33
End: 2023-07-20
Payer: COMMERCIAL

## 2023-07-20 VITALS
TEMPERATURE: 98 F | OXYGEN SATURATION: 98 % | DIASTOLIC BLOOD PRESSURE: 77 MMHG | RESPIRATION RATE: 14 BRPM | HEART RATE: 76 BPM | SYSTOLIC BLOOD PRESSURE: 113 MMHG

## 2023-07-20 DIAGNOSIS — J06.9 VIRAL URI: Primary | ICD-10-CM

## 2023-07-20 DIAGNOSIS — J02.9 SORE THROAT: ICD-10-CM

## 2023-07-20 LAB
DEPRECATED S PYO AG THROAT QL EIA: NEGATIVE
GROUP A STREP BY PCR: NOT DETECTED

## 2023-07-20 PROCEDURE — 87651 STREP A DNA AMP PROBE: CPT | Performed by: FAMILY MEDICINE

## 2023-07-20 PROCEDURE — 99213 OFFICE O/P EST LOW 20 MIN: CPT | Performed by: FAMILY MEDICINE

## 2023-07-20 NOTE — PATIENT INSTRUCTIONS
Rapid strep screen is negative.  I suspect this is likely viral.  I would recommend holding off on any antibiotics at this time.  Follow-up if symptoms are getting worse or not improving as expected 1 week.

## 2023-07-20 NOTE — PROGRESS NOTES
Assessment:       Viral URI    Sore throat  - Streptococcus A Rapid Screen w/Reflex to PCR - Clinic Collect  - Group A Streptococcus PCR Throat Swab         Plan:     Symptoms consistent with a viral upper respiratory infection.  Rapid strep screen is negative discussed the typical course of symptoms.  Noantibiotics indicated at this time.  Recommend symptomatic treatment such as decongestants and acetominephen or ibuprofen as needed.  Recommend follow up if getting worse or not improving.      Subjective:       32 year old female presents for evaluation of onset of congestion, sore throat, and headache that started around 3 AM.  No fevers, chills, cough, shortness of breath, or wheezing.  She denies any ear pain.  She had recent exposure to strep throat and wants to make sure she does not have strep.    Patient Active Problem List   Diagnosis     ACL tear     PCOS (polycystic ovarian syndrome)     Hemorrhoids, unspecified hemorrhoid type     S/P      History of cholestasis during pregnancy     Cervical high risk HPV (human papillomavirus) test positive       Past Medical History:   Diagnosis Date     Encounter for induction of labor 2022     Failed induction of labor 2022     S/P  2022       Past Surgical History:   Procedure Laterality Date      SECTION N/A 2022    Procedure:  SECTION;  Surgeon: Essence Perkins MD;  Location: [facility] OR     HEMORRHOIDECTOMY       LAPAROSCOPIC HERNIORRHAPHY UMBILICAL N/A 3/9/2023    Procedure: HERNIORRHAPHY, UMBILICAL, LAPAROSCOPIC;  Surgeon: Sahil Méndez MD;  Location: [facility] OR       Current Outpatient Medications   Medication     hydrOXYzine (ATARAX) 25 MG tablet     Prenatal Vit-Fe Fumarate-FA (PRENATAL MULTIVITAMIN W/IRON) 27-0.8 MG tablet     sertraline (ZOLOFT) 25 MG tablet     Magnesium Oxide, Laxative, (LAX) 500 MG TABS tablet     ondansetron (ZOFRAN ODT) 4 MG ODT tab     valACYclovir  (VALTREX) 1000 mg tablet     No current facility-administered medications for this visit.       No Known Allergies    Family History   Problem Relation Age of Onset     No Known Problems Mother      No Known Problems Father      No Known Problems Sister      No Known Problems Brother      Chronic Obstructive Pulmonary Disease Maternal Grandmother      Chronic Obstructive Pulmonary Disease Maternal Grandfather      Other - See Comments Maternal Grandfather         heart condition     Cancer Paternal Grandmother      Myocardial Infarction Paternal Grandfather        Social History     Socioeconomic History     Marital status: Single     Spouse name: None     Number of children: None     Years of education: None     Highest education level: None   Tobacco Use     Smoking status: Former     Packs/day: 0.25     Types: Cigarettes     Quit date: 2022     Years since quittin.3     Passive exposure: Never     Smokeless tobacco: Never   Vaping Use     Vaping Use: Never used   Substance and Sexual Activity     Alcohol use: Not Currently     Drug use: Not Currently     Sexual activity: Yes     Partners: Male     Social Determinants of Health     Financial Resource Strain: Medium Risk (2022)    Overall Financial Resource Strain (CARDIA)      Difficulty of Paying Living Expenses: Somewhat hard   Food Insecurity: No Food Insecurity (2022)    Hunger Vital Sign      Worried About Running Out of Food in the Last Year: Never true      Ran Out of Food in the Last Year: Never true   Transportation Needs: No Transportation Needs (2022)    PRAPARE - Transportation      Lack of Transportation (Medical): No      Lack of Transportation (Non-Medical): No   Housing Stability: Low Risk  (2022)    Housing Stability Vital Sign      Unable to Pay for Housing in the Last Year: No      Number of Places Lived in the Last Year: 1      Unstable Housing in the Last Year: No         Review of Systems  Pertinent items are  noted in HPI.      Objective:                 General Appearance:    /77   Pulse 76   Temp 98  F (36.7  C) (Oral)   Resp 14   LMP  (LMP Unknown)   SpO2 98%         Alert, pleasant, cooperative, no distress, appears stated age   Head:    Normocephalic, without obvious abnormality, atraumatic   Eyes:    Conjunctiva/corneas clear   Ears:    Normal TM's without erythema or bulging. Normal external ear canals, both ears   Nose:   Nares normal, septum midline, mucosa normal, no drainage    or sinus tenderness   Throat:   Lips, mucosa, and tongue normal; teeth and gums normal.  No tonsilar hypertrophy or exudate.   Neck:   Supple, symmetrical, trachea midline, no adenopathy    Lungs:     Clear to auscultation bilaterally without wheezes, rales, or rhonchi, respirations unlabored    Heart:    Regular rate and rhythm, S1 and S2 normal, no murmur, rub or gallop       Extremities:   Extremities normal, atraumatic, no cyanosis or edema   Skin:   Skin color, texture, turgor normal, no rashes or lesions         Results for orders placed or performed in visit on 07/20/23   Streptococcus A Rapid Screen w/Reflex to PCR - Clinic Collect     Status: Normal    Specimen: Throat; Swab   Result Value Ref Range    Group A Strep antigen Negative Negative       This note has been dictated using voice recognition software. Any grammatical or context distortions are unintentional and inherent to the software

## 2023-07-25 ENCOUNTER — MYC MEDICAL ADVICE (OUTPATIENT)
Dept: FAMILY MEDICINE | Facility: CLINIC | Age: 33
End: 2023-07-25
Payer: COMMERCIAL

## 2023-07-25 NOTE — TELEPHONE ENCOUNTER
See [employer] message sent to the patient on 7/25/23 regarding future scheduling with imaging.    Thelma Monroy RN, BSN  [facility]

## 2023-08-02 ENCOUNTER — HOSPITAL ENCOUNTER (OUTPATIENT)
Dept: ULTRASOUND IMAGING | Facility: CLINIC | Age: 33
Discharge: HOME | End: 2023-08-02
Attending: FAMILY MEDICINE | Admitting: FAMILY MEDICINE
Payer: COMMERCIAL

## 2023-08-02 DIAGNOSIS — R10.13 ABDOMINAL PAIN, EPIGASTRIC: ICD-10-CM

## 2023-08-02 PROCEDURE — 76705 ECHO EXAM OF ABDOMEN: CPT

## 2023-08-07 ENCOUNTER — MYC MEDICAL ADVICE (OUTPATIENT)
Dept: FAMILY MEDICINE | Facility: CLINIC | Age: 33
End: 2023-08-07
Payer: COMMERCIAL

## 2023-08-07 DIAGNOSIS — O21.9 NAUSEA AND VOMITING DURING PREGNANCY: ICD-10-CM

## 2023-08-08 RX ORDER — ONDANSETRON 4 MG/1
4 TABLET, ORALLY DISINTEGRATING ORAL EVERY 8 HOURS PRN
Qty: 20 TABLET | Refills: 3 | Status: SHIPPED | OUTPATIENT
Start: 2023-08-08 | End: 2024-01-03

## 2023-08-08 NOTE — TELEPHONE ENCOUNTER
Prescription approved per [facility] Refill Protocol.    TONY XieN, RN  [facility]

## 2023-08-14 NOTE — PROGRESS NOTES
"Clinic Note - Return OB Visit    Ileana Licea is a 32 year old  female who presents to clinic for a follow up OB visit. She is currently 17w4d with an estimated date of delivery of 2024 via  [facility]. She denies headache, chest pain, shortness of breath, abdominal pain/contractions, vaginal bleeding, or abnormal discharge. She has not felt baby move.     New concerns today:   -overall doing well  -stomach pain better lately   -mood better    OB History    Para Term  AB Living   2 1 0 1 0 1   SAB IAB Ectopic Multiple Live Births   0 0 0 0 1      # Outcome Date GA Lbr Josleito/2nd Weight Sex Delivery Anes PTL Lv   2 Current            1  22 36w5d  3.75 kg (8 lb 4.3 oz) M CS-LTranv EPI  PEDRO      Complications: Failure to Progress in First Stage      Name: WINSTONMALE-ILEANA      Apgar1: 9  Apgar5: 9       Physical exam:  /70   Pulse 84   Temp 98.4  F (36.9  C) (Temporal)   Resp 18   Ht 1.626 m (5' 4\")   Wt 85.9 kg (189 lb 6.4 oz)   LMP  (LMP Unknown)   SpO2 98%   BMI 32.51 kg/m      General: alert, female in no acute distress  Abdomen: gravid uterus appropriate for gestation age above pubic symphysis, non-tender, FHTs 145  Extremities: no edema    Supervision of other normal pregnancy, antepartum   at 17+4 weeks today. Pregnancy complicated by conditions as below. Fetal survey ordered today to be done at 20 weeks.    History of cholestasis during pregnancy  Hx cholestasis with last pregnancy - discussed potential for recurrence this pregnancy (60-70%), will check labs if pt develops itching.    Hx of  section  Hx  for failed induction for cholestasis, plan referral to OBGYN later in pregnancy to discuss repeat  vs TOLAC - discussed likely recommendation for repeat c/s given short interpregnancy interval, pt ok with this.    Hx of  delivery, currently pregnant  Prior delivery at 36+4 weeks d/t cholestasis, no " indication for progesterone.    Obesity affecting pregnancy, antepartum  Pregravid BMI 30.71 - total weight gain so far 6 lbs, goal weight gain <20 lbs.    Generalized anxiety disorder  Depression affecting pregnancy  Hx anxiety/depression. Currently taking zoloft 50mg daily.      Reviewed pre- labs: O neg, invitae nml, girl    Follow up in 4 weeks for routine prenatal visit.     Dee Davidson MD  [facility]

## 2023-08-15 ENCOUNTER — PRENATAL OFFICE VISIT (OUTPATIENT)
Dept: FAMILY MEDICINE | Facility: CLINIC | Age: 33
End: 2023-08-15
Payer: COMMERCIAL

## 2023-08-15 VITALS
DIASTOLIC BLOOD PRESSURE: 70 MMHG | WEIGHT: 189.4 LBS | OXYGEN SATURATION: 98 % | TEMPERATURE: 98.4 F | SYSTOLIC BLOOD PRESSURE: 120 MMHG | HEART RATE: 84 BPM | RESPIRATION RATE: 18 BRPM | BODY MASS INDEX: 32.33 KG/M2 | HEIGHT: 64 IN

## 2023-08-15 DIAGNOSIS — Z34.80 SUPERVISION OF OTHER NORMAL PREGNANCY, ANTEPARTUM: Primary | ICD-10-CM

## 2023-08-15 DIAGNOSIS — F32.A DEPRESSION AFFECTING PREGNANCY: ICD-10-CM

## 2023-08-15 DIAGNOSIS — Z87.59 HISTORY OF CHOLESTASIS DURING PREGNANCY: ICD-10-CM

## 2023-08-15 DIAGNOSIS — O09.899 HX OF PRETERM DELIVERY, CURRENTLY PREGNANT: ICD-10-CM

## 2023-08-15 DIAGNOSIS — O99.340 DEPRESSION AFFECTING PREGNANCY: ICD-10-CM

## 2023-08-15 DIAGNOSIS — O99.210 OBESITY AFFECTING PREGNANCY, ANTEPARTUM: ICD-10-CM

## 2023-08-15 DIAGNOSIS — Z87.19 HISTORY OF CHOLESTASIS DURING PREGNANCY: ICD-10-CM

## 2023-08-15 DIAGNOSIS — Z98.891 HX OF CESAREAN SECTION: ICD-10-CM

## 2023-08-15 DIAGNOSIS — F41.1 GENERALIZED ANXIETY DISORDER: ICD-10-CM

## 2023-08-15 PROCEDURE — 99207 PR PRENATAL VISIT: CPT | Performed by: FAMILY MEDICINE

## 2023-08-15 RX ORDER — HYDROXYZINE HYDROCHLORIDE 25 MG/1
25-50 TABLET, FILM COATED ORAL
Qty: 60 TABLET | Refills: 8 | Status: SHIPPED | OUTPATIENT
Start: 2023-08-15

## 2023-08-15 ASSESSMENT — PAIN SCALES - GENERAL: PAINLEVEL: NO PAIN (0)

## 2023-09-07 ENCOUNTER — HOSPITAL ENCOUNTER (OUTPATIENT)
Dept: ULTRASOUND IMAGING | Facility: CLINIC | Age: 33
Discharge: HOME | End: 2023-09-07
Attending: FAMILY MEDICINE | Admitting: FAMILY MEDICINE
Payer: COMMERCIAL

## 2023-09-07 DIAGNOSIS — Z34.80 SUPERVISION OF OTHER NORMAL PREGNANCY, ANTEPARTUM: ICD-10-CM

## 2023-09-07 PROCEDURE — 76805 OB US >/= 14 WKS SNGL FETUS: CPT

## 2023-09-18 NOTE — PATIENT INSTRUCTIONS
Phone Numbers:  [facility]: [phone]  [facility]: [phone]     When to call or come in to the hospital  If you notice a decrease in your baby's movement.   If your begin to experience contractions that are 5 minutes or less apart and lasting for over 45 seconds, or if contractions are becoming more painful.  If you have any bleeding or leakage of fluids.   If you have a headache not resolved with tylenol, right upper abdominal pain, or sudden onset of swelling.  You know your body best. Never hesitate to call or go to the hospital if something doesn't feel right!    Gestational Diabetes Screen  At your next visit, you will be screened for gestational diabetes. You will drink a sugary drink and then have your blood drawn to see how your body responds to a sugar load. This test takes about an hour to complete - please plan your schedule accordingly!    The Benefits of Breastfeeding   Breastfeeding gives your new baby the very best start. It supplies nutrition, comfort, and love. Experts agree: Breastfeeding is the healthiest choice for babies during the first year of life and beyond. It s healthy for Mom, too. Breastfeeding may be challenging at first. But with support, you and your baby can succeed together.      Healthiest for Baby   Breastmilk is the ideal food for babies. It has all the nutrients your little one needs to grow healthy and strong. Here are some of the many benefits for your baby:   Breastfeeding provides contact that babies love. Frequent skin-to-skin time with Mom is calming and comforting.   Breastmilk is full of antibodies. These are substances that help your baby fight infection. Breastmilk reduces your baby's risk of respiratory illnesses, ear infections, and diarrhea.   Breastfeeding reduces your baby s risk of allergies, colds, and many other diseases.   Breastmilk changes as your baby grows, meeting her changing needs.   Breastmilk is easy for your baby to digest.   Breastmilk  contains DHA, a fat that is good for your baby s developing brain and eyes.   Breastmilk decreases the risk of sudden infant death syndrome (SIDS).    Healthiest for Mom   For many women, breastfeeding is an amazing experience. It creates a strong bond between mother and baby. Other benefits for Mom include:   You can feel proud knowing that your baby is growing healthy and strong because of your milk.   Breastmilk is convenient. It s free, clean, and always the right temperature.   Breastfeeding burns calories. This can help you lose pregnancy weight faster.   Breastfeeding releases hormones that contract the uterus. This helps the uterus return to its normal size after childbirth.   Mothers who breastfeed have a decreased risk of ovarian and breast cancers.    [place] parenting classes [email or web address]/  [place] parenting classes [email or web address]  Free online classes through [employer]

## 2023-09-18 NOTE — PROGRESS NOTES
"Clinic Note - Return OB Visit    Ileana Licea is a 32 year old  female who presents to clinic for a follow up OB visit. She is currently 22w6d with an estimated date of delivery of 2024 via  [facility]. She denies headache, chest pain, shortness of breath, abdominal pain/contractions, vaginal bleeding, or abnormal discharge. She has felt baby move.     New concerns today:   -abdominal spot still hurts but overall doing well  -small melasma spot on face  -vaginal odor and some itching/burning, discharge - a little better past few days   -questions about Harry - sleep and cervical lymph node    OB History    Para Term  AB Living   2 1 0 1 0 1   SAB IAB Ectopic Multiple Live Births   0 0 0 0 1      # Outcome Date GA Lbr Joselito/2nd Weight Sex Delivery Anes PTL Lv   2 Current            1  22 36w5d  3.75 kg (8 lb 4.3 oz) M CS-LTranv EPI  PEDRO      Complications: Failure to Progress in First Stage      Name: WINSTON,MALE-ILEANA      Apgar1: 9  Apgar5: 9       Physical exam:  /80   Pulse 81   Temp 97.9  F (36.6  C)   Ht 1.626 m (5' 4\")   Wt 92.2 kg (203 lb 4.2 oz)   LMP  (LMP Unknown)   SpO2 100%   BMI 34.89 kg/m      General: alert, female in no acute distress  Abdomen: gravid uterus at 24 cm, non-tender, FHTs 145  Extremities: no edema    Supervision of other normal pregnancy, antepartum   at 22+6 weeks today. Pregnancy complicated by conditions as below.     History of cholestasis during pregnancy  Hx cholestasis with last pregnancy - discussed potential for recurrence this pregnancy (60-70%), will check labs if pt develops itching.    Hx of  section  Hx  for failed induction for cholestasis, placed referral to OBGYN to discuss repeat  vs TOLAC - discussed likely recommendation for repeat c/s given short interpregnancy interval, pt ok with this.    Hx of  delivery, currently pregnant  Prior delivery at 36+4 weeks d/t " cholestasis, no indication for progesterone.    Obesity affecting pregnancy, antepartum  Pregravid BMI 30.71 - total weight gain so far 20 lbs, goal weight gain <20 lbs.    Generalized anxiety disorder  Depression affecting pregnancy  Hx anxiety/depression. Currently taking zoloft 50mg daily.    Vaginal odor  Will check wet prep today, pt self swabbed.       Reviewed pre- labs: O neg, invitae nml, girl    Follow up in 4 weeks for routine prenatal visit.     Dee Davidson MD  [facility]

## 2023-09-21 ENCOUNTER — PRENATAL OFFICE VISIT (OUTPATIENT)
Dept: FAMILY MEDICINE | Facility: CLINIC | Age: 33
End: 2023-09-21
Payer: COMMERCIAL

## 2023-09-21 VITALS
WEIGHT: 203.26 LBS | OXYGEN SATURATION: 100 % | HEIGHT: 64 IN | DIASTOLIC BLOOD PRESSURE: 80 MMHG | SYSTOLIC BLOOD PRESSURE: 110 MMHG | HEART RATE: 81 BPM | BODY MASS INDEX: 34.7 KG/M2 | TEMPERATURE: 97.9 F

## 2023-09-21 DIAGNOSIS — F41.1 GENERALIZED ANXIETY DISORDER: ICD-10-CM

## 2023-09-21 DIAGNOSIS — O99.340 DEPRESSION AFFECTING PREGNANCY: ICD-10-CM

## 2023-09-21 DIAGNOSIS — F32.A DEPRESSION AFFECTING PREGNANCY: ICD-10-CM

## 2023-09-21 DIAGNOSIS — Z87.59 HISTORY OF CHOLESTASIS DURING PREGNANCY: ICD-10-CM

## 2023-09-21 DIAGNOSIS — O09.899 HX OF PRETERM DELIVERY, CURRENTLY PREGNANT: ICD-10-CM

## 2023-09-21 DIAGNOSIS — Z34.80 SUPERVISION OF OTHER NORMAL PREGNANCY, ANTEPARTUM: Primary | ICD-10-CM

## 2023-09-21 DIAGNOSIS — N89.8 VAGINAL ODOR: ICD-10-CM

## 2023-09-21 DIAGNOSIS — Z87.19 HISTORY OF CHOLESTASIS DURING PREGNANCY: ICD-10-CM

## 2023-09-21 DIAGNOSIS — Z98.891 HX OF CESAREAN SECTION: ICD-10-CM

## 2023-09-21 DIAGNOSIS — B37.31 YEAST INFECTION OF THE VAGINA: ICD-10-CM

## 2023-09-21 DIAGNOSIS — O99.210 OBESITY AFFECTING PREGNANCY, ANTEPARTUM: ICD-10-CM

## 2023-09-21 PROCEDURE — 87210 SMEAR WET MOUNT SALINE/INK: CPT | Performed by: FAMILY MEDICINE

## 2023-09-21 PROCEDURE — 99207 PR PRENATAL VISIT: CPT | Performed by: FAMILY MEDICINE

## 2023-09-21 RX ORDER — CLOTRIMAZOLE 1 %
1 CREAM WITH APPLICATOR VAGINAL AT BEDTIME
Qty: 1 G | Refills: 0 | Status: SHIPPED | OUTPATIENT
Start: 2023-09-21 | End: 2023-09-28

## 2023-09-21 ASSESSMENT — PAIN SCALES - GENERAL: PAINLEVEL: NO PAIN (0)

## 2023-09-24 ENCOUNTER — MYC MEDICAL ADVICE (OUTPATIENT)
Dept: FAMILY MEDICINE | Facility: CLINIC | Age: 33
End: 2023-09-24
Payer: COMMERCIAL

## 2023-09-24 DIAGNOSIS — F41.1 GENERALIZED ANXIETY DISORDER: ICD-10-CM

## 2023-09-24 DIAGNOSIS — O99.340 DEPRESSION AFFECTING PREGNANCY: ICD-10-CM

## 2023-09-24 DIAGNOSIS — F32.A DEPRESSION AFFECTING PREGNANCY: ICD-10-CM

## 2023-09-25 NOTE — TELEPHONE ENCOUNTER
There are refills left for hydroxyzine.   Rx pended for sertraline 25 mg and routing to Refill Pool.    Robyn Novak, TONYN, RN  [facility]

## 2023-09-26 RX ORDER — SERTRALINE HYDROCHLORIDE 25 MG/1
25 TABLET, FILM COATED ORAL DAILY
Qty: 90 TABLET | Refills: 3 | Status: SHIPPED | OUTPATIENT
Start: 2023-09-26 | End: 2024-04-12

## 2023-10-16 NOTE — PROGRESS NOTES
"Clinic Note - Return OB Visit    Ileana Licea is a 32 year old  female who presents to clinic for a follow up OB visit. She is currently 26w6d with an estimated date of delivery of 2024 via  [facility]. She denies headache, chest pain, shortness of breath, abdominal pain/contractions, vaginal bleeding, or abnormal discharge. She has felt baby move.     New concerns today:   -nesting at home, doing well  -will schedule apt with OB    OB History    Para Term  AB Living   2 1 0 1 0 1   SAB IAB Ectopic Multiple Live Births   0 0 0 0 1      # Outcome Date GA Lbr Joselito/2nd Weight Sex Delivery Anes PTL Lv   2 Current            1  22 36w5d  3.75 kg (8 lb 4.3 oz) M CS-LTranv EPI  PEDRO      Complications: Failure to Progress in First Stage      Name: WINSTONMALE-ILEANA      Apgar1: 9  Apgar5: 9       Physical exam:  /70   Pulse 82   Temp 98.1  F (36.7  C) (Temporal)   Resp 18   Ht 1.626 m (5' 4\")   Wt 95.3 kg (210 lb)   LMP  (LMP Unknown)   SpO2 98%   BMI 36.05 kg/m      General: alert, female in no acute distress  Abdomen: gravid uterus at 27 cm, non-tender, FHTs 145  Extremities: no edema    Supervision of other normal pregnancy, antepartum   at 26+6 weeks today. Pregnancy complicated by conditions as below. Declines flu shot.     History of cholestasis during pregnancy  Hx cholestasis with last pregnancy - discussed potential for recurrence this pregnancy (60-70%), will check labs if pt develops itching.    Hx of  section  Hx  for failed induction for cholestasis, placed referral to OBGYN to discuss repeat  vs TOLAC - discussed likely recommendation for repeat c/s given short interpregnancy interval, pt ok with this.    Hx of  delivery, currently pregnant  Prior delivery at 36+4 weeks d/t cholestasis, no indication for progesterone.    Obesity affecting pregnancy, antepartum  Pregravid BMI 30.71 - total weight gain " so far 27 lbs, goal weight gain <20 lbs. Discussed some tips for healthy diet in pregnancy; pt declines dietician referral at this time.     Generalized anxiety disorder  Depression affecting pregnancy  Hx anxiety/depression. Currently taking zoloft 50mg daily.      Reviewed pre- labs: O neg, invitae nml, girl    Follow up in 2 weeks for routine prenatal visit.     Dee Davidson MD  [facility]

## 2023-10-16 NOTE — PATIENT INSTRUCTIONS
Phone Numbers:  [facility]: [phone]  [facility]: [phone]     When to call or come in to the hospital  If you notice a decrease in your baby's movement.   If your begin to experience contractions that are 5 minutes or less apart and lasting for over 45 seconds, or if contractions are becoming more painful.  If you have any bleeding or leakage of fluids.   If you have a headache not resolved with tylenol, right upper abdominal pain, or sudden onset of swelling.  You know your body best. Never hesitate to call or go to the hospital if something doesn't feel right!    Gestational Diabetes Screen  At your next visit, you will be screened for gestational diabetes. You will drink a sugary drink and then have your blood drawn to see how your body responds to a sugar load. This test takes about an hour to complete - please plan your schedule accordingly!    The Benefits of Breastfeeding   Breastfeeding gives your new baby the very best start. It supplies nutrition, comfort, and love. Experts agree: Breastfeeding is the healthiest choice for babies during the first year of life and beyond. It s healthy for Mom, too. Breastfeeding may be challenging at first. But with support, you and your baby can succeed together.      Healthiest for Baby   Breastmilk is the ideal food for babies. It has all the nutrients your little one needs to grow healthy and strong. Here are some of the many benefits for your baby:   Breastfeeding provides contact that babies love. Frequent skin-to-skin time with Mom is calming and comforting.   Breastmilk is full of antibodies. These are substances that help your baby fight infection. Breastmilk reduces your baby's risk of respiratory illnesses, ear infections, and diarrhea.   Breastfeeding reduces your baby s risk of allergies, colds, and many other diseases.   Breastmilk changes as your baby grows, meeting her changing needs.   Breastmilk is easy for your baby to digest.   Breastmilk  contains DHA, a fat that is good for your baby s developing brain and eyes.   Breastmilk decreases the risk of sudden infant death syndrome (SIDS).    Healthiest for Mom   For many women, breastfeeding is an amazing experience. It creates a strong bond between mother and baby. Other benefits for Mom include:   You can feel proud knowing that your baby is growing healthy and strong because of your milk.   Breastmilk is convenient. It s free, clean, and always the right temperature.   Breastfeeding burns calories. This can help you lose pregnancy weight faster.   Breastfeeding releases hormones that contract the uterus. This helps the uterus return to its normal size after childbirth.   Mothers who breastfeed have a decreased risk of ovarian and breast cancers.    [place] parenting classes [email or web address]/  [place] parenting classes [email or web address]-information  Free online classes through [employer]

## 2023-10-19 ENCOUNTER — PRENATAL OFFICE VISIT (OUTPATIENT)
Dept: FAMILY MEDICINE | Facility: CLINIC | Age: 33
End: 2023-10-19
Payer: COMMERCIAL

## 2023-10-19 VITALS
HEIGHT: 64 IN | HEART RATE: 82 BPM | SYSTOLIC BLOOD PRESSURE: 110 MMHG | DIASTOLIC BLOOD PRESSURE: 70 MMHG | TEMPERATURE: 98.1 F | WEIGHT: 210 LBS | RESPIRATION RATE: 18 BRPM | BODY MASS INDEX: 35.85 KG/M2 | OXYGEN SATURATION: 98 %

## 2023-10-19 DIAGNOSIS — F41.1 GENERALIZED ANXIETY DISORDER: ICD-10-CM

## 2023-10-19 DIAGNOSIS — O99.210 OBESITY AFFECTING PREGNANCY, ANTEPARTUM, UNSPECIFIED OBESITY TYPE: ICD-10-CM

## 2023-10-19 DIAGNOSIS — Z87.19 HISTORY OF CHOLESTASIS DURING PREGNANCY: ICD-10-CM

## 2023-10-19 DIAGNOSIS — Z34.80 SUPERVISION OF OTHER NORMAL PREGNANCY, ANTEPARTUM: Primary | ICD-10-CM

## 2023-10-19 DIAGNOSIS — Z87.59 HISTORY OF CHOLESTASIS DURING PREGNANCY: ICD-10-CM

## 2023-10-19 DIAGNOSIS — F32.A DEPRESSION AFFECTING PREGNANCY: ICD-10-CM

## 2023-10-19 DIAGNOSIS — Z98.891 HX OF CESAREAN SECTION: ICD-10-CM

## 2023-10-19 DIAGNOSIS — O09.899 HX OF PRETERM DELIVERY, CURRENTLY PREGNANT: ICD-10-CM

## 2023-10-19 DIAGNOSIS — O99.340 DEPRESSION AFFECTING PREGNANCY: ICD-10-CM

## 2023-10-19 LAB
ERYTHROCYTE [DISTWIDTH] IN BLOOD BY AUTOMATED COUNT: 12.8 % (ref 10–15)
GLUCOSE 1H P 50 G GLC PO SERPL-MCNC: 107 MG/DL (ref 70–129)
HCT VFR BLD AUTO: 32.8 % (ref 35–47)
HGB BLD-MCNC: 11.5 G/DL (ref 11.7–15.7)
MCH RBC QN AUTO: 31.3 PG (ref 26.5–33)
MCHC RBC AUTO-ENTMCNC: 35.1 G/DL (ref 31.5–36.5)
MCV RBC AUTO: 89 FL (ref 78–100)
PLATELET # BLD AUTO: 287 10E3/UL (ref 150–450)
RBC # BLD AUTO: 3.67 10E6/UL (ref 3.8–5.2)
T PALLIDUM AB SER QL: NONREACTIVE
WBC # BLD AUTO: 7.5 10E3/UL (ref 4–11)

## 2023-10-19 PROCEDURE — 99207 PR PRENATAL VISIT: CPT | Performed by: FAMILY MEDICINE

## 2023-10-19 PROCEDURE — 82950 GLUCOSE TEST: CPT | Performed by: FAMILY MEDICINE

## 2023-10-19 PROCEDURE — 36415 COLL VENOUS BLD VENIPUNCTURE: CPT | Performed by: FAMILY MEDICINE

## 2023-10-19 PROCEDURE — 85027 COMPLETE CBC AUTOMATED: CPT | Performed by: FAMILY MEDICINE

## 2023-10-19 PROCEDURE — 86780 TREPONEMA PALLIDUM: CPT | Performed by: FAMILY MEDICINE

## 2023-10-19 ASSESSMENT — PAIN SCALES - GENERAL: PAINLEVEL: NO PAIN (0)

## 2023-10-26 ENCOUNTER — MYC MEDICAL ADVICE (OUTPATIENT)
Dept: FAMILY MEDICINE | Facility: CLINIC | Age: 33
End: 2023-10-26
Payer: COMMERCIAL

## 2023-10-26 DIAGNOSIS — K64.4 EXTERNAL HEMORRHOIDS: Primary | ICD-10-CM

## 2023-10-27 NOTE — TELEPHONE ENCOUNTER
Dr Davidson,  Please see MyChart message from patient needing provider's direction.  Please respond directly to patient if appropriate.    TONY CornellN, RN  [facility]

## 2023-10-30 ENCOUNTER — MYC MEDICAL ADVICE (OUTPATIENT)
Dept: FAMILY MEDICINE | Facility: CLINIC | Age: 33
End: 2023-10-30
Payer: COMMERCIAL

## 2023-10-30 NOTE — TELEPHONE ENCOUNTER
Dr Davidson,  Please see MyChart message from patient needing provider's direction.  Please respond directly to patient if appropriate.    TONY CornellN, RN  [facility]

## 2023-11-03 ENCOUNTER — TRANSFERRED RECORDS (OUTPATIENT)
Dept: HEALTH INFORMATION MANAGEMENT | Facility: CLINIC | Age: 33
End: 2023-11-03
Payer: COMMERCIAL

## 2023-11-03 ENCOUNTER — TELEPHONE (OUTPATIENT)
Dept: FAMILY MEDICINE | Facility: CLINIC | Age: 33
End: 2023-11-03
Payer: COMMERCIAL

## 2023-11-03 NOTE — TELEPHONE ENCOUNTER
Due to limited availability I recommend keeping pt at current slot and I will either start apt a little late or leave interview early - thanks

## 2023-11-03 NOTE — TELEPHONE ENCOUNTER
Hello due to the provider interview on 11/07 it is flagging for this patient to be moved. Is there somewhere that you will like me to put her?

## 2023-11-06 NOTE — PROGRESS NOTES
"Clinic Note - Return OB Visit    Ileana Licea is a 32 year old  female who presents to clinic for a follow up OB visit. She is currently 29w4d with an estimated date of delivery of 2024 via  [facility]. She denies headache, chest pain, shortness of breath, abdominal pain/contractions, vaginal bleeding, or abnormal discharge. She has felt baby move.     New concerns today:   -feeling itchy now - will check labs for cholestasis  -hemorrhoids - really bothering her, large, bleeding - referral in  - scheduled for 24!    OB History    Para Term  AB Living   2 1 0 1 0 1   SAB IAB Ectopic Multiple Live Births   0 0 0 0 1      # Outcome Date GA Lbr Joselito/2nd Weight Sex Delivery Anes PTL Lv   2 Current            1  22 36w5d  3.75 kg (8 lb 4.3 oz) M CS-LTranv EPI  PEDRO      Complications: Failure to Progress in First Stage      Name: WINSTON,MALE-ILEANA      Apgar1: 9  Apgar5: 9       Physical exam:  /70   Pulse 83   Temp 98.4  F (36.9  C) (Temporal)   Resp 18   Ht 1.626 m (5' 4\")   Wt 97.7 kg (215 lb 4.8 oz)   LMP  (LMP Unknown)   SpO2 98%   BMI 36.96 kg/m      General: alert, female in no acute distress  Abdomen: gravid uterus at 29 cm, non-tender, FHTs 155  Extremities: no edema    Supervision of other normal pregnancy, antepartum   at 29+4 weeks today. Pregnancy complicated by conditions as below. Declines flu shot. Tdap today.    History of cholestasis during pregnancy  Hx cholestasis with last pregnancy - discussed potential for recurrence this pregnancy (60-70%), will check labs today since she is having itching concerns.    Hx of  section  Hx  for failed induction for cholestasis, repeat  scheduled for  at 7:15am at [facility] with Dr Amaya.    Hx of  delivery, currently pregnant  Prior delivery at 36+4 weeks d/t cholestasis, no indication for progesterone.    Obesity affecting pregnancy, " antepartum  Pregravid BMI 30.71 - total weight gain so far 32 lbs, goal weight gain <20 lbs. Discussed some tips for healthy diet in pregnancy; pt declines dietician referral at this time.     Generalized anxiety disorder  Depression affecting pregnancy  Hx anxiety/depression. Currently taking zoloft 50mg daily.      Reviewed pre-dayne labs: O neg, invitae nml, girl    Follow up in 2 weeks for routine prenatal visit.     Dee Davidson MD  [facility]

## 2023-11-06 NOTE — PATIENT INSTRUCTIONS
Phone Numbers:  [facility]: [phone]  [facility]: [phone]     When to call or come in to the hospital  If you notice a decrease in your baby's movement.   If your begin to experience contractions that are 5 minutes or less apart and lasting for over 45 seconds, or if contractions are becoming more painful.  If you have any bleeding or leakage of fluids.   If you have a headache not resolved with tylenol, right upper abdominal pain, or sudden onset of swelling.  You know your body best. Never hesitate to call or go to the hospital if something doesn't feel right!    [facility] parenting classes [email or web address]  [place] parenting classes [email or web address]  Free online classes through [employer]    Blood Glucose Screening During Pregnancy   Gestational diabetes is diabetes that only pregnant women get. Changes in your body during pregnancy can cause high blood sugar (glucose). This can cause problems for you and your baby. It is a serious condition, but it can be controlled.  What to Know If You Test Positive   Gestational diabetes is treatable. The best way to control gestational diabetes is to find out you have it as early as possible and start treatment quickly.   Gestational diabetes can cause problems for the mother during pregnancy. It can also cause problems with the baby during pregnancy, delivery, and after. Treatment greatly lowers the chance for problems.   The changes in your body that cause gestational diabetes normally occur only when you are pregnant. After the baby is born, your body goes back to normal and the condition goes away. You may be more likely to have type 2 diabetes later, though. So talk to your doctor about ways to help prevent type 2 diabetes.  Treating Gestational Diabetes   You ll need to check your blood sugar regularly. You can do this at home by pricking your finger and  checking a drop of blood on a glucose monitor. Your health care provider will show you how and when to check your blood sugar and discuss your target blood sugar level.   To manage your blood sugar, you will be given a special plan. It will likely involve planning your meals and getting regular exercise. Some women need to take a hormone called insulin, or an oral hypoglycemic medication to help control their blood sugar.    Making Plans for Feeding Your Baby  By this point, you probably have read a lot about feeding your baby. Breastfeed or formula? Each mother s decision is her own and we respect you and your choices. We ve gathered information on both breastfeeding and formula feeding to help with your decision. Talking with your physician can help in your decision.     Skin-to-skin contact  Being close to mom helps your baby adjust to life outside of the womb. It helps your baby regulate their body temperature, heart rate, and breathing. Your baby will usually be placed skin-to-skin immediately following birth or as soon as possible, if medical intervention is needed.    Rooming-In  Having your baby stay with you in your room is called  rooming-in.  Keeping your baby in your room helps you to learn how to care for your baby by getting to know your baby s cues, body rhythms and sleep cycle.       Cue-based feeding  Cues (signals) are baby s way of telling you what he or she wants. When you learn your infant s cues, you know how to care for and feed your baby. Feeding cues are the licking and smacking of lips, bringing their fist to their mouth, and a reflex called  rooting - where baby turns and opens his or her mouth, searching for the breast or bottle. Crying is a late feeding cue. Babies can feed frequently, often at least 8 times in 24 hours.  Breastfeeding facts  Breast milk is the best source of nutrition for your baby and is available at birth. In the first couple of days, your milk volume is already  starting to increase, though it may not be noticeable. Breastfeeding frequently to increase your milk supply. Within three to five days, you will begin to notice larger milk volumes. An increase in breast size, heaviness and firmness are often described as the milk  coming in.  Frequent breastfeeding can help breasts from getting overly firm and painful. You will know the baby is getting enough milk if your baby is having wet and dirty diapers and gaining weight.   Positioning and attachment   Get comfortable. Use pillows as needed to support your arms and baby. Hold baby close at the level of your breast, facing you in a tummy to tummy position. Skin to skin helps with this. Position the baby with his or her nose by the nipple. There should be a straight line from baby s ear to shoulder to hips.  Tickle your baby s lips or wait for baby to open mouth wide, bring baby to breast by leading with the chin. Aim the nipple at the roof of baby s mouth. A rapid sucking pattern is followed by longer, drawing pattern with occasional swallows heard. When baby is correctly latched, your nipple and much of the areola are pulled well into baby s mouth.      Returning to work or school  Focus on a good start to breastfeeding. Many women continue to provide breastmilk for their baby when they return to work or school. Making plans about where to pump and store milk can make the transition go well. Talk with other mothers who have also returned to work or school for tips and support. Your employer s Human Resource department may be a resource as well.      babies can mean fewer  sick  days for you.  A quality breast pump will also save time and add comfort. Check with your insurance prior to giving birth for breast pump coverage. Many insurance companies include a pump within your benefits.  Breastfeed when you are with your baby. Reserve your bottles of breast milk for when you are away.   Your breasts will need to be   emptied  either by your baby or a pump. Plan to pump at least twice in an eight hour day.  If you cannot pump at work, continue breastfeeding at home. Any amount of breast milk is worth giving to your baby.    Formula feeding facts  If you are planning to use formula to feed your baby, you will want to make some preparations ahead of time. Talk to your doctor about what type of formula to use. Some are iron-fortified, meaning they have extra iron in them. You will want to purchase formula and bottles before your baby is born to be sure you are ready after you return from the hospital. The hospitals do not provide formula samples to take home.    Be sure to follow formula mixing directions closely. Regular milk in the dairy case at the grocery store should not be given to babies under 1 year old. Baby formula is sold in several forms including:  Ready-to-use. This is the most expensive, but no mixing is necessary.  Concentrated liquid. This is less expensive than ready-to-use and you mix with water.  Powder. This is the least expensive. You mix one level scoop of powdered formula with two ounces of water and stir well.    How do I warm my baby s bottles?  You may feed your baby a bottle without warming it first. It is OK for the breast milk or formula to be cool or room temperature. If your baby seems to prefer it warmed, you can put the filled bottle in a container of warm water and let it stand for a few minutes. Check the temperature of the liquid on your skin before feeding it to your baby; to be sure it isn t too hot. Do not heat bottles in the microwave. Microwaves heat food and liquids unevenly, and this can cause hot spots that can burn your baby.    How do I clean and sterilize bottles?  Sterilize bottles and nipples before you use them for the first time. You can do this by putting them in boiling water for 5 minutes. After that first time, you can wash them in hot and soapy water. Rinse them carefully to  be sure there is no soap left on them. You can also wash them in the .

## 2023-11-07 ENCOUNTER — PRENATAL OFFICE VISIT (OUTPATIENT)
Dept: FAMILY MEDICINE | Facility: CLINIC | Age: 33
End: 2023-11-07
Payer: COMMERCIAL

## 2023-11-07 VITALS
RESPIRATION RATE: 18 BRPM | HEIGHT: 64 IN | DIASTOLIC BLOOD PRESSURE: 70 MMHG | BODY MASS INDEX: 36.76 KG/M2 | WEIGHT: 215.3 LBS | TEMPERATURE: 98.4 F | SYSTOLIC BLOOD PRESSURE: 110 MMHG | OXYGEN SATURATION: 98 % | HEART RATE: 83 BPM

## 2023-11-07 DIAGNOSIS — O09.899 HX OF PRETERM DELIVERY, CURRENTLY PREGNANT: ICD-10-CM

## 2023-11-07 DIAGNOSIS — Z34.80 SUPERVISION OF OTHER NORMAL PREGNANCY, ANTEPARTUM: Primary | ICD-10-CM

## 2023-11-07 DIAGNOSIS — Z98.891 HX OF CESAREAN SECTION: ICD-10-CM

## 2023-11-07 DIAGNOSIS — F41.1 GENERALIZED ANXIETY DISORDER: ICD-10-CM

## 2023-11-07 DIAGNOSIS — O99.210 OBESITY AFFECTING PREGNANCY, ANTEPARTUM, UNSPECIFIED OBESITY TYPE: ICD-10-CM

## 2023-11-07 DIAGNOSIS — O99.340 DEPRESSION AFFECTING PREGNANCY: ICD-10-CM

## 2023-11-07 DIAGNOSIS — Z87.59 HISTORY OF CHOLESTASIS DURING PREGNANCY: ICD-10-CM

## 2023-11-07 DIAGNOSIS — F32.A DEPRESSION AFFECTING PREGNANCY: ICD-10-CM

## 2023-11-07 DIAGNOSIS — Z87.19 HISTORY OF CHOLESTASIS DURING PREGNANCY: ICD-10-CM

## 2023-11-07 LAB
ALBUMIN SERPL BCG-MCNC: 3.5 G/DL (ref 3.5–5.2)
ALP SERPL-CCNC: 69 U/L (ref 35–104)
ALT SERPL W P-5'-P-CCNC: 12 U/L (ref 0–50)
AST SERPL W P-5'-P-CCNC: 14 U/L (ref 0–45)
BILIRUB DIRECT SERPL-MCNC: <0.2 MG/DL (ref 0–0.3)
BILIRUB SERPL-MCNC: <0.2 MG/DL
PROT SERPL-MCNC: 6.5 G/DL (ref 6.4–8.3)

## 2023-11-07 PROCEDURE — 90715 TDAP VACCINE 7 YRS/> IM: CPT | Performed by: FAMILY MEDICINE

## 2023-11-07 PROCEDURE — 90471 IMMUNIZATION ADMIN: CPT | Performed by: FAMILY MEDICINE

## 2023-11-07 PROCEDURE — 99207 PR PRENATAL VISIT: CPT | Performed by: FAMILY MEDICINE

## 2023-11-07 PROCEDURE — 82239 BILE ACIDS TOTAL: CPT | Mod: 90 | Performed by: FAMILY MEDICINE

## 2023-11-07 PROCEDURE — 80076 HEPATIC FUNCTION PANEL: CPT | Performed by: FAMILY MEDICINE

## 2023-11-07 PROCEDURE — 36415 COLL VENOUS BLD VENIPUNCTURE: CPT | Performed by: FAMILY MEDICINE

## 2023-11-07 PROCEDURE — 99000 SPECIMEN HANDLING OFFICE-LAB: CPT | Performed by: FAMILY MEDICINE

## 2023-11-07 ASSESSMENT — PAIN SCALES - GENERAL: PAINLEVEL: NO PAIN (0)

## 2023-11-09 LAB — BILE AC SERPL-SCNC: 3 UMOL/L

## 2023-11-14 ENCOUNTER — TELEPHONE (OUTPATIENT)
Dept: FAMILY MEDICINE | Facility: CLINIC | Age: 33
End: 2023-11-14
Payer: COMMERCIAL

## 2023-11-14 NOTE — TELEPHONE ENCOUNTER
Called patient and unable to leave message. Please help patient schedule a in-person visit with Dr. Martel or any available provider for sores.

## 2023-12-04 NOTE — PATIENT INSTRUCTIONS
Phone Numbers:  [facility]: [phone]  [facility]: [phone]     When to call or come in to the hospital  If you notice a decrease in your baby's movement.   If your begin to experience contractions that are 5 minutes or less apart and lasting for over 45 seconds, or if contractions are becoming more painful.  If you have any bleeding or leakage of fluids.   If you have a headache not resolved with tylenol, right upper abdominal pain, or sudden onset of swelling.  You know your body best. Never hesitate to call or go to the hospital if something doesn't feel right!    Preparing for the hospital  You re likely feeling anxious as your child s birth approaches. This is normal. To give yourself some peace of mind, pack a bag 3-4 weeks before your due date. Here is a list of things to remember:  Personal care items like toothbrush, hair brush, lip balm, lotion, shampoo, glasses, contacts  Nightgown, bathrobe, slippers  Several pairs of underwear  Comfortable clothes for you to wear home  Camera with new batteries  Cell phone and   Insurance information and any other paperwork needed for your hospital stay  Clothes for baby to wear home  An infant, rear-facing car seat for bringing home your baby (this is required by law)    Managing Labor Pain   There are many ways to manage pain during labor. It can often be done with no anesthesia or strong pain medications. Talk to your health care provider about any options you would like to explore.     Help from Relaxation  Some of these are learned in special classes. Your health care provider can help you find classes. The hospital has a tub you can use during early labor. These methods may be of help to you:   Breathing techniques   A warm tub between contractions   Massage and therapeutic touch by your support person or labor    Reading materials that are comforting or inspiring   Music that is soothing   Hypnosis   Acupuncture and acupressure   Heat and  cold application    Help From Analgesics   Analgesics are mild medications that reduce pain. They can be used along with some relaxation methods. They can give you pain relief without total loss of feeling. They may lessen the pain of strong contractions. You may feel well enough to nap between contractions. They have little effect on your baby if given early in labor. This may be done by injection or by IV.     Help From Anesthetics   Anesthesia involves blockage of all feeling including pain. It can be given in the form of local anesthesia or general anesthesia.  Anesthesia is a type of medication to prevent pain. It is often used in labor. It may numb only one region of your body. This is called regional anesthesia. Or it may let you sleep during surgery. This is called general anesthesia. This type of medication is given by a trained specialist. When possible, regional anesthesia will be used. This is so you can be awake during your baby s birth.     Regional Anesthesia   Regional anesthesia may be used to numb your lower body for a vaginal or  birth. It does not go into your bloodstream. This means that little or none of it will reach your baby. There are two kinds:   Epidural. This is most often given while you sit up or lie on your side. A needle with a flexible tube (catheter) is put in your lower back. The needle is then removed. The anesthetic is sent through the catheter. A pump may be attached. This gives you a constant level of anesthetic. An epidural often only partly affects muscle control. This means you will still be able to push for a vaginal birth.   Spinal. This is most often given in one dose right before delivery. It acts fast. You may sit up or lie down when it is injected. It may affect muscle control in your lower body. This includes the ability to push.    General Anesthesia   General anesthesia lets you sleep and keeps you free of pain during surgery. It may be used for a .  It may be given as an injection. It may be given as an inhaled gas. Or it may be given as both. Delivery often occurs before the medication has reached the baby.    [place] parenting classes [email or web address]  [place] parenting classes [email or web address]  Free online classes through [facility]

## 2023-12-04 NOTE — PROGRESS NOTES
"Clinic Note - Return OB Visit    Ileana Licea is a 32 year old  female who presents to clinic for a follow up OB visit. She is currently 33w6d with an estimated date of delivery of 2024 via  [facility]. She denies headache, chest pain, shortness of breath, abdominal pain/contractions, vaginal bleeding, or abnormal discharge. She has felt baby move.     New concerns today:   -carpal tunnel symptoms  - scheduled for 24    OB History    Para Term  AB Living   2 1 0 1 0 1   SAB IAB Ectopic Multiple Live Births   0 0 0 0 1      # Outcome Date GA Lbr Joselito/2nd Weight Sex Delivery Anes PTL Lv   2 Current            1  22 36w5d  3.75 kg (8 lb 4.3 oz) M CS-LTranv EPI  PEDRO      Complications: Failure to Progress in First Stage      Name: WINSTON,MALE-ILEANA      Apgar1: 9  Apgar5: 9       Physical exam:  /70 (BP Location: Left arm, Patient Position: Sitting, Cuff Size: Adult Regular)   Pulse 80   Temp 97.5  F (36.4  C) (Temporal)   Resp 16   Ht 1.66 m (5' 5.35\")   Wt 100.3 kg (221 lb 3.2 oz)   LMP  (LMP Unknown)   SpO2 97%   BMI 36.41 kg/m      General: alert, female in no acute distress  Abdomen: gravid uterus at 33 cm, non-tender, FHTs 140 - vertex on handheld US  Extremities: no edema    Supervision of other normal pregnancy, antepartum   at 33+6 weeks today. Pregnancy complicated by conditions as below. Declines flu shot. Tdap given. RSV given today.    History of cholestasis during pregnancy  Hx cholestasis with last pregnancy - discussed potential for recurrence this pregnancy (60-70%), will check labs as needed for symptoms.     Hx of  section  Hx  for failed induction for cholestasis, repeat  scheduled for  at 7:15am at [facility] with Dr Amaya.    Hx of  delivery, currently pregnant  Prior delivery at 36+4 weeks d/t cholestasis, no indication for progesterone.    Obesity affecting pregnancy, " antepartum  Pregravid BMI 30.71 - total weight gain so far 38 lbs, goal weight gain <20 lbs. Discussed some tips for healthy diet in pregnancy; pt declines dietician referral at this time.     Generalized anxiety disorder  Depression affecting pregnancy  Hx anxiety/depression. Currently taking zoloft 50mg daily.      Reviewed pre-dayne labs: O neg, invitae nml, girl    Follow up in 2 weeks for routine prenatal visit.     Dee Davidson MD  [facility]

## 2023-12-07 ENCOUNTER — PRENATAL OFFICE VISIT (OUTPATIENT)
Dept: FAMILY MEDICINE | Facility: CLINIC | Age: 33
End: 2023-12-07
Payer: COMMERCIAL

## 2023-12-07 VITALS
BODY MASS INDEX: 36.85 KG/M2 | SYSTOLIC BLOOD PRESSURE: 112 MMHG | HEIGHT: 65 IN | RESPIRATION RATE: 16 BRPM | OXYGEN SATURATION: 97 % | WEIGHT: 221.2 LBS | HEART RATE: 80 BPM | DIASTOLIC BLOOD PRESSURE: 70 MMHG | TEMPERATURE: 97.5 F

## 2023-12-07 DIAGNOSIS — Z87.59 HISTORY OF CHOLESTASIS DURING PREGNANCY: ICD-10-CM

## 2023-12-07 DIAGNOSIS — O99.340 DEPRESSION AFFECTING PREGNANCY: ICD-10-CM

## 2023-12-07 DIAGNOSIS — O09.899 HX OF PRETERM DELIVERY, CURRENTLY PREGNANT: ICD-10-CM

## 2023-12-07 DIAGNOSIS — Z98.891 HX OF CESAREAN SECTION: ICD-10-CM

## 2023-12-07 DIAGNOSIS — Z87.19 HISTORY OF CHOLESTASIS DURING PREGNANCY: ICD-10-CM

## 2023-12-07 DIAGNOSIS — O99.210 OBESITY AFFECTING PREGNANCY, ANTEPARTUM, UNSPECIFIED OBESITY TYPE: ICD-10-CM

## 2023-12-07 DIAGNOSIS — F32.A DEPRESSION AFFECTING PREGNANCY: ICD-10-CM

## 2023-12-07 DIAGNOSIS — F41.1 GENERALIZED ANXIETY DISORDER: ICD-10-CM

## 2023-12-07 DIAGNOSIS — Z34.80 SUPERVISION OF OTHER NORMAL PREGNANCY, ANTEPARTUM: Primary | ICD-10-CM

## 2023-12-07 PROCEDURE — 90471 IMMUNIZATION ADMIN: CPT | Performed by: FAMILY MEDICINE

## 2023-12-07 PROCEDURE — 90678 RSV VACC PREF BIVALENT IM: CPT | Performed by: FAMILY MEDICINE

## 2023-12-07 PROCEDURE — 99207 PR PRENATAL VISIT: CPT | Performed by: FAMILY MEDICINE

## 2023-12-07 ASSESSMENT — PAIN SCALES - GENERAL: PAINLEVEL: NO PAIN (0)

## 2023-12-18 ENCOUNTER — MYC MEDICAL ADVICE (OUTPATIENT)
Dept: FAMILY MEDICINE | Facility: CLINIC | Age: 33
End: 2023-12-18
Payer: COMMERCIAL

## 2023-12-18 NOTE — PROGRESS NOTES
"Clinic Note - Return OB Visit    Ileana Licea is a 32 year old  female who presents to clinic for a follow up OB visit. She is currently 35w4d with an estimated date of delivery of 2024 via  [facility]. She denies headache, chest pain, shortness of breath, abdominal pain/contractions, vaginal bleeding, or abnormal discharge. She has felt baby move.     New concerns today:   -son with COVID and RSV  -SPD pain - daily, pretty bad  -mild itching - wants bile acids checked given hx cholestasis - worried they are elevated and that baby would need to be delivered soon  - scheduled for 24    OB History    Para Term  AB Living   2 1 0 1 0 1   SAB IAB Ectopic Multiple Live Births   0 0 0 0 1      # Outcome Date GA Lbr Joselito/2nd Weight Sex Delivery Anes PTL Lv   2 Current            1  22 36w5d  3.75 kg (8 lb 4.3 oz) M CS-LTranv EPI  PEDRO      Complications: Failure to Progress in First Stage      Name: WINSTONMALE-ILEANA      Apgar1: 9  Apgar5: 9       Physical exam:  /70   Pulse 91   Temp 97.8  F (36.6  C) (Temporal)   Resp 16   Ht 1.651 m (5' 5\")   Wt 100.8 kg (222 lb 3.2 oz)   LMP  (LMP Unknown)   SpO2 99%   BMI 36.98 kg/m      General: alert, female in no acute distress  Abdomen: gravid uterus at 36 cm, non-tender, FHTs 130 - vertex on handheld US  Extremities: no edema    Supervision of other normal pregnancy, antepartum   at 35+4 weeks today. Pregnancy complicated by conditions as below. Declines flu shot. Tdap and RSV vaccines given. GBS swab done today.    History of cholestasis during pregnancy  Hx cholestasis with last pregnancy - discussed potential for recurrence this pregnancy (60-70%), will check labs as needed for symptoms - pt with mild itching at this time and worried about recurrence, will check LFTs and bile acids today - if elevated will call and formulate plan.    Hx of  section  Hx  for failed " induction for cholestasis, repeat  scheduled for  at 7:15am at [facility] with Dr Amaya.    Hx of  delivery, currently pregnant  Prior delivery at 36+4 weeks d/t cholestasis, no indication for progesterone.    Obesity affecting pregnancy, antepartum  Pregravid BMI 30.71 - total weight gain so far 39 lbs, goal weight gain <20 lbs. Discussed some tips for healthy diet in pregnancy; pt declined dietician referral.     Generalized anxiety disorder  Depression affecting pregnancy  Hx anxiety/depression. Currently taking zoloft 50mg daily.      Reviewed pre-dayne labs: O neg, invitae nml, girl    Follow up in 1 week for routine prenatal visit.     Dee Davidson MD  [facility]

## 2023-12-18 NOTE — PATIENT INSTRUCTIONS
Phone Numbers:  [facility]: [phone]  [facility]: [phone]     When to call or come in to the hospital  If you notice a decrease in your baby's movement.   If your begin to experience contractions that are 5 minutes or less apart and lasting for over 45 seconds, or if contractions are becoming more painful.  If you have any bleeding or leakage of fluids.   If you have a headache not resolved with tylenol, right upper abdominal pain, or sudden onset of swelling.  You know your body best. Never hesitate to call or go to the hospital if something doesn't feel right!    Preparing for the hospital  You re likely feeling anxious as your child s birth approaches. This is normal. To give yourself some peace of mind, pack a bag 3-4 weeks before your due date. Here is a list of things to remember:  Personal care items like toothbrush, hair brush, lip balm, lotion, shampoo, glasses, contacts  Nightgown, bathrobe, slippers  Several pairs of underwear  Comfortable clothes for you to wear home  Camera with new batteries  Cell phone and   Insurance information and any other paperwork needed for your hospital stay  Clothes for baby to wear home  An infant, rear-facing car seat for bringing home your baby (this is required by law)    What Is Group B Strep?   Group B strep (streptococcus) is a common bacteria. It can grow in a woman s vagina, rectum, or urinary tract. It is almost always harmless in adults. But in rare cases, a woman who has group B strep can infect her baby during the birth. Infection can cause serious illness in the . The good news is that treating the mother during labor reduces the risk of the baby becoming infected. And if a  is infected, the infection can be treated. You will be screened for Group B strep at 35-36 weeks gestation.    Facts about group B strep   Learning more about group B strep can help you understand how testing and treatment can help. Here are some basic  facts about group B strep:   It is not a sexually transmitted disease.   It is not the same as strep throat. (This is caused by group A strep.)   It often has no symptoms and may cause no problems in adults.   Group B strep can be transmitted during vaginal delivery. It cannot be passed during  (surgical) birth.   A mother with group B strep rarely infects her . (Infection occurs only about 1% to 2% of the time.)   When a mother is treated during labor and delivery, her baby almost never becomes infected.   Certain factors during pregnancy increase the risk of a baby becoming infected.    Possible effects on your baby   Group B strep can infect the blood. It can also cause inflammation of the baby s lungs, brain, or spinal cord. Long-term effects can include blindness, deafness, mental retardation, or cerebral palsy. And in rare cases, infection causes death. Infection is most often detected soon after the baby is born.     How your baby may become infected   Group B strep often lives in the vagina or rectum. If the amniotic sac breaks early, bacteria from the vagina can travel to the uterus, reaching the baby. Or, as the baby passes through the birth canal, it can come in contact with the bacteria. In rare cases, group B strep can also be passed to the baby after delivery. This is called late-onset group B strep. The source of this type of infection is not well understood. But some experts believe that it happens if the baby is exposed to group B strep in the home, from the parents or siblings, or in the community.     What increases the risk?   Certain risk factors increase the chance that a baby will be infected. They include:   Breaking or leaking of the amniotic sac earlier than 37 weeks gestation   Labor earlier than 37 weeks gestation   Breaking of the amniotic sac more than 18 hours before labor begins   Fever during labor   A urinary tract infection with group B strep at any point in the  pregnancy   A previous baby born with a group B strep infection    BaBaby Feeding in the Hospital: Information, Support and      Resources    As you prepare for the birth of your child, you will want to consider options for feeding your baby cluding breast-feeding and/or baby formula. The American Academy of Pediatrics recommends exclusive breast-feeding for the first six months (although any amount of breast-feeding is beneficial).  However, we also understand that breast-feeding is  a personal choice and not for everyone. Whether or not you choose to breast-feed, your decision will be respected by our staff.        There are numerous benefits of breast-feeding; here are a few to consider:  Provides antibodies to protect your baby from infections and diseases  The cost: formula can cost over $1,500 per year  Convenience, no warming up or sterilizing bottles and supplies  The physical contact with breastfeeding can make babies feel secure, warm and comforted    Whatever my feeding choice, what can I expect after I deliver my baby?  Your baby will usually be placed skin-to-skin immediately following birth. The skin to skin contact between you and your baby will be a special and memorable time. The bonding and attachment comforts your baby and has a positive effect on baby s brain development.   Having your baby  room in  with you also helps you start to learn your baby s body rhythms and sleep cycle.    You will also begin to learn your baby s cues (signals) that he or she is ready to feed.    When do I start to feed my baby?   As soon as possible after your baby s birth, you will be encouraged to begin feeding. In the first couple of weeks, your baby will eat often. Breastfeeding babies usually eat at least 8 times in 24 hours. Babies fed formula usually eat at least 7 times in 24 hours.      Breast-feeding tips:  Get comfortable and use pillows for support.  Have your baby at the level of your breast, facing you,   tummy to tummy.     Touch your nipple to your baby s lips so your baby s mouth opens wide (rooting reflex).  Aim the nipple toward the roof of your baby s mouth. When your baby opens his or her mouth, pull your baby toward your breast to help your baby  latch on  to your nipple and much of the areola area.  Hand expressing your breast milk can assist with latching your baby to your breast, if needed.  Ask for help, breastfeeding may seem awkward or uncomfortable at first, this is normal. There are numerous resources available.  Mixing breastfeeding and formula can interfere with how you begin building your milk supply. It can impact how you and your baby learn to breastfeeding together and alter the natural growth of  good  bacteria in your baby s stomach.  Ask your nurse to show you how to hand express.   Breast milk can be kept in the refrigerator orfreezer for later use.     Hospital Resources:  [facility] Lactation Clinics located at [facility], [facility] and [facility]  Call: [phone].  Inpatient support  Outpatient appointments  Telephone consultation  Breast-feeding classes available through [employer]      Online Resources:  [email or web address] sign up for free online weekly e-mail  [email or web address]  [email or web address]  Llli.org (La Leche League)  Normalfed.com  [email or web address]/breastfeeding  Workandpump.com    Breast-feeding Supplies & Pumps:  Talk to your insurance provider or WIC (Women, Infants and Children) to learn more about options available to you. Recent health insurance changes may include additional coverage for supplies and pumps.    Public Health:  Women, Infants and Children Nutrition program (WIC): provides breast-feeding support and education in addition to formal feeding moms. 057-MZO-4001 or [email or web address]/divs/fh/wic    Family Health Home Visiting: Public [facility] Nurse home visits are available. Talk to  your provider to see if you qualify. Most [facility] have a program available.    Additional Resources:  La Leche League is an international, nonprofit, nonsectarian organization offering information, education, and support to mothers who want to breast-feed their babies. Local groups offer phone help and monthly meetings. Visit [employer]ihome.org or [employer].org and us the  Find local support  drop down menu or click on the  Resources  tab.    [place] Breastfeeding Resources: 3-264-968-BABY (0909) toll free    National Breastfeeding Help Line trained breastfeeding peer counselors can help answer common breast-feeding questions by phone. Monday-Friday: English/[place]er  [phone] toll free, [phone] (TTY)

## 2023-12-19 ENCOUNTER — PRENATAL OFFICE VISIT (OUTPATIENT)
Dept: FAMILY MEDICINE | Facility: CLINIC | Age: 33
End: 2023-12-19
Payer: COMMERCIAL

## 2023-12-19 VITALS
HEIGHT: 65 IN | WEIGHT: 222.2 LBS | SYSTOLIC BLOOD PRESSURE: 110 MMHG | DIASTOLIC BLOOD PRESSURE: 70 MMHG | TEMPERATURE: 97.8 F | HEART RATE: 91 BPM | OXYGEN SATURATION: 99 % | RESPIRATION RATE: 16 BRPM | BODY MASS INDEX: 37.02 KG/M2

## 2023-12-19 DIAGNOSIS — O09.899 HX OF PRETERM DELIVERY, CURRENTLY PREGNANT: ICD-10-CM

## 2023-12-19 DIAGNOSIS — Z98.891 HX OF CESAREAN SECTION: ICD-10-CM

## 2023-12-19 DIAGNOSIS — Z87.59 HISTORY OF CHOLESTASIS DURING PREGNANCY: ICD-10-CM

## 2023-12-19 DIAGNOSIS — O99.340 DEPRESSION AFFECTING PREGNANCY: ICD-10-CM

## 2023-12-19 DIAGNOSIS — Z34.80 SUPERVISION OF OTHER NORMAL PREGNANCY, ANTEPARTUM: Primary | ICD-10-CM

## 2023-12-19 DIAGNOSIS — F41.1 GENERALIZED ANXIETY DISORDER: ICD-10-CM

## 2023-12-19 DIAGNOSIS — O99.210 OBESITY AFFECTING PREGNANCY, ANTEPARTUM, UNSPECIFIED OBESITY TYPE: ICD-10-CM

## 2023-12-19 DIAGNOSIS — F32.A DEPRESSION AFFECTING PREGNANCY: ICD-10-CM

## 2023-12-19 DIAGNOSIS — Z87.19 HISTORY OF CHOLESTASIS DURING PREGNANCY: ICD-10-CM

## 2023-12-19 PROCEDURE — 99000 SPECIMEN HANDLING OFFICE-LAB: CPT | Performed by: FAMILY MEDICINE

## 2023-12-19 PROCEDURE — 87653 STREP B DNA AMP PROBE: CPT | Performed by: FAMILY MEDICINE

## 2023-12-19 PROCEDURE — 80076 HEPATIC FUNCTION PANEL: CPT | Performed by: FAMILY MEDICINE

## 2023-12-19 PROCEDURE — 82239 BILE ACIDS TOTAL: CPT | Mod: 90 | Performed by: FAMILY MEDICINE

## 2023-12-19 PROCEDURE — 36415 COLL VENOUS BLD VENIPUNCTURE: CPT | Performed by: FAMILY MEDICINE

## 2023-12-19 PROCEDURE — 99207 PR PRENATAL VISIT: CPT | Performed by: FAMILY MEDICINE

## 2023-12-19 ASSESSMENT — PAIN SCALES - GENERAL: PAINLEVEL: NO PAIN (0)

## 2023-12-20 LAB
ALBUMIN SERPL BCG-MCNC: 3.6 G/DL (ref 3.5–5.2)
ALP SERPL-CCNC: 118 U/L (ref 40–150)
ALT SERPL W P-5'-P-CCNC: 12 U/L (ref 0–50)
AST SERPL W P-5'-P-CCNC: 16 U/L (ref 0–45)
BILIRUB DIRECT SERPL-MCNC: <0.2 MG/DL (ref 0–0.3)
BILIRUB SERPL-MCNC: 0.2 MG/DL
GP B STREP DNA SPEC QL NAA+PROBE: NEGATIVE
PROT SERPL-MCNC: 6.6 G/DL (ref 6.4–8.3)

## 2023-12-22 LAB — BILE AC SERPL-SCNC: 4 UMOL/L

## 2023-12-26 ENCOUNTER — PRENATAL OFFICE VISIT (OUTPATIENT)
Dept: FAMILY MEDICINE | Facility: CLINIC | Age: 33
End: 2023-12-26
Payer: COMMERCIAL

## 2023-12-26 VITALS
BODY MASS INDEX: 37.01 KG/M2 | HEART RATE: 82 BPM | SYSTOLIC BLOOD PRESSURE: 110 MMHG | WEIGHT: 222.38 LBS | OXYGEN SATURATION: 99 % | DIASTOLIC BLOOD PRESSURE: 70 MMHG | TEMPERATURE: 97 F

## 2023-12-26 DIAGNOSIS — F41.1 GENERALIZED ANXIETY DISORDER: ICD-10-CM

## 2023-12-26 DIAGNOSIS — O99.210 OBESITY AFFECTING PREGNANCY, ANTEPARTUM, UNSPECIFIED OBESITY TYPE: ICD-10-CM

## 2023-12-26 DIAGNOSIS — F32.A DEPRESSION AFFECTING PREGNANCY: ICD-10-CM

## 2023-12-26 DIAGNOSIS — Z98.891 HX OF CESAREAN SECTION: ICD-10-CM

## 2023-12-26 DIAGNOSIS — Z34.80 SUPERVISION OF OTHER NORMAL PREGNANCY, ANTEPARTUM: Primary | ICD-10-CM

## 2023-12-26 DIAGNOSIS — O99.340 DEPRESSION AFFECTING PREGNANCY: ICD-10-CM

## 2023-12-26 DIAGNOSIS — O09.899 HX OF PRETERM DELIVERY, CURRENTLY PREGNANT: ICD-10-CM

## 2023-12-26 DIAGNOSIS — Z87.19 HISTORY OF CHOLESTASIS DURING PREGNANCY: ICD-10-CM

## 2023-12-26 DIAGNOSIS — Z87.59 HISTORY OF CHOLESTASIS DURING PREGNANCY: ICD-10-CM

## 2023-12-26 PROCEDURE — 99207 PR PRENATAL VISIT: CPT | Performed by: FAMILY MEDICINE

## 2023-12-26 ASSESSMENT — PAIN SCALES - GENERAL: PAINLEVEL: NO PAIN (0)

## 2023-12-26 NOTE — TELEPHONE ENCOUNTER
Patient had an appointment with Dr. Davidson on 12/26 before RN could respond.     Concerns were addressed in that visit. No action needed.    Sabiha SIMONS RN

## 2023-12-26 NOTE — PATIENT INSTRUCTIONS
Phone Numbers:  [facility]: [phone]  [facility]: [phone]     When to call or come in to the hospital  If you notice a decrease in your baby's movement.   If your begin to experience contractions that are 5 minutes or less apart and lasting for over 45 seconds, or if contractions are becoming more painful.  If you have any bleeding or leakage of fluids.   If you have a headache not resolved with tylenol, right upper abdominal pain, or sudden onset of swelling.  You know your body best. Never hesitate to call or go to the hospital if something doesn't feel right!    Preparing for the hospital  You re likely feeling anxious as your child s birth approaches. This is normal. To give yourself some peace of mind, pack a bag 3-4 weeks before your due date. Here is a list of things to remember:  Personal care items like toothbrush, hair brush, lip balm, lotion, shampoo, glasses, contacts  Nightgown, bathrobe, slippers  Several pairs of underwear  Comfortable clothes for you to wear home  Camera with new batteries  Cell phone and   Insurance information and any other paperwork needed for your hospital stay  Clothes for baby to wear home  An infant, rear-facing car seat for bringing home your baby (this is required by law)    What Is Group B Strep?   Group B strep (streptococcus) is a common bacteria. It can grow in a woman s vagina, rectum, or urinary tract. It is almost always harmless in adults. But in rare cases, a woman who has group B strep can infect her baby during the birth. Infection can cause serious illness in the . The good news is that treating the mother during labor reduces the risk of the baby becoming infected. And if a  is infected, the infection can be treated. You will be screened for Group B strep at 35-36 weeks gestation.    Facts about group B strep   Learning more about group B strep can help you understand how testing and treatment can help. Here are some basic  facts about group B strep:   It is not a sexually transmitted disease.   It is not the same as strep throat. (This is caused by group A strep.)   It often has no symptoms and may cause no problems in adults.   Group B strep can be transmitted during vaginal delivery. It cannot be passed during  (surgical) birth.   A mother with group B strep rarely infects her . (Infection occurs only about 1% to 2% of the time.)   When a mother is treated during labor and delivery, her baby almost never becomes infected.   Certain factors during pregnancy increase the risk of a baby becoming infected.    Possible effects on your baby   Group B strep can infect the blood. It can also cause inflammation of the baby s lungs, brain, or spinal cord. Long-term effects can include blindness, deafness, mental retardation, or cerebral palsy. And in rare cases, infection causes death. Infection is most often detected soon after the baby is born.     How your baby may become infected   Group B strep often lives in the vagina or rectum. If the amniotic sac breaks early, bacteria from the vagina can travel to the uterus, reaching the baby. Or, as the baby passes through the birth canal, it can come in contact with the bacteria. In rare cases, group B strep can also be passed to the baby after delivery. This is called late-onset group B strep. The source of this type of infection is not well understood. But some experts believe that it happens if the baby is exposed to group B strep in the home, from the parents or siblings, or in the community.     What increases the risk?   Certain risk factors increase the chance that a baby will be infected. They include:   Breaking or leaking of the amniotic sac earlier than 37 weeks gestation   Labor earlier than 37 weeks gestation   Breaking of the amniotic sac more than 18 hours before labor begins   Fever during labor   A urinary tract infection with group B strep at any point in the  pregnancy   A previous baby born with a group B strep infection    BaBaby Feeding in the Hospital: Information, Support and      Resources    As you prepare for the birth of your child, you will want to consider options for feeding your baby cluding breast-feeding and/or baby formula. The American Academy of Pediatrics recommends exclusive breast-feeding for the first six months (although any amount of breast-feeding is beneficial).  However, we also understand that breast-feeding is  a personal choice and not for everyone. Whether or not you choose to breast-feed, your decision will be respected by our staff.        There are numerous benefits of breast-feeding; here are a few to consider:  Provides antibodies to protect your baby from infections and diseases  The cost: formula can cost over $1,500 per year  Convenience, no warming up or sterilizing bottles and supplies  The physical contact with breastfeeding can make babies feel secure, warm and comforted    Whatever my feeding choice, what can I expect after I deliver my baby?  Your baby will usually be placed skin-to-skin immediately following birth. The skin to skin contact between you and your baby will be a special and memorable time. The bonding and attachment comforts your baby and has a positive effect on baby s brain development.   Having your baby  room in  with you also helps you start to learn your baby s body rhythms and sleep cycle.    You will also begin to learn your baby s cues (signals) that he or she is ready to feed.    When do I start to feed my baby?   As soon as possible after your baby s birth, you will be encouraged to begin feeding. In the first couple of weeks, your baby will eat often. Breastfeeding babies usually eat at least 8 times in 24 hours. Babies fed formula usually eat at least 7 times in 24 hours.      Breast-feeding tips:  Get comfortable and use pillows for support.  Have your baby at the level of your breast, facing you,   tummy to tummy.     Touch your nipple to your baby s lips so your baby s mouth opens wide (rooting reflex).  Aim the nipple toward the roof of your baby s mouth. When your baby opens his or her mouth, pull your baby toward your breast to help your baby  latch on  to your nipple and much of the areola area.  Hand expressing your breast milk can assist with latching your baby to your breast, if needed.  Ask for help, breastfeeding may seem awkward or uncomfortable at first, this is normal. There are numerous resources available.  Mixing breastfeeding and formula can interfere with how you begin building your milk supply. It can impact how you and your baby learn to breastfeeding together and alter the natural growth of  good  bacteria in your baby s stomach.  Ask your nurse to show you how to hand express.   Breast milk can be kept in the refrigerator orfreezer for later use.     Hospital Resources:  [facility] Lactation Clinics located at [facility], [facility] and [facility]  Call: [phone].  Inpatient support  Outpatient appointments  Telephone consultation  Breast-feeding classes available through [employer]      Online Resources:  [email or web address] sign up for free online weekly e-mail  [email or web address]  Breastfeedingmadesimple.com  Llli.org (La Leche League)  Normalfed.com  Womenshealth.gov/breastfeeding  Workandpump.com    Breast-feeding Supplies & Pumps:  Talk to your insurance provider or WIC (Women, Infants and Children) to learn more about options available to you. Recent health insurance changes may include additional coverage for supplies and pumps.    Public Health:  Women, Infants and Children Nutrition program (WIC): provides breast-feeding support and education in addition to formal feeding moms. 287-TCT-1270 or [email or web address].[email or web address]/divs/fh/wic    Family Health Home Visiting: Public [facility] Nurse home visits are available. Talk to  your provider to see if you qualify. Most [facility] have a program available.    Additional Resources:  La Leche League is an international, nonprofit, nonsectarian organization offering information, education, and support to mothers who want to breast-feed their babies. Local groups offer phone help and monthly meetings. Visit [facility].org or Insightfulincli.org and us the  Find local support  drop down menu or click on the  Resources  tab.    [place] Breastfeeding Resources: 4-998-644-BABY (2077) toll free    National Breastfeeding Help Line trained breastfeeding peer counselors can help answer common breast-feeding questions by phone. Monday-Friday: English/Belgian  [phone]- [phone] toll free, [phone] (TTY)

## 2023-12-26 NOTE — PROGRESS NOTES
Clinic Note - Return OB Visit    Ileana Licea is a 32 year old  female who presents to clinic for a follow up OB visit. She is currently 36w4d with an estimated date of delivery of 2024 via  [facility]. She denies headache, chest pain, shortness of breath, abdominal pain/contractions, vaginal bleeding, or abnormal discharge. She has felt baby move.     New concerns today:   -son with COVID and RSV - she tested positive now but >5 days into symptoms and super mild symptoms   -SPD still bothering her  -had some cramps last night that were quite uncomfortable  - scheduled for 24    OB History    Para Term  AB Living   2 1 0 1 0 1   SAB IAB Ectopic Multiple Live Births   0 0 0 0 1      # Outcome Date GA Lbr Joselito/2nd Weight Sex Delivery Anes PTL Lv   2 Current            1  22 36w5d  3.75 kg (8 lb 4.3 oz) M CS-LTranv EPI  PEDRO      Complications: Failure to Progress in First Stage      Name: WINSTONMALE-ILEANA      Apgar1: 9  Apgar5: 9       Physical exam:  /70   Pulse 82   Temp 97  F (36.1  C)   Wt 100.9 kg (222 lb 6 oz)   LMP  (LMP Unknown)   SpO2 99%   BMI 37.01 kg/m      General: alert, female in no acute distress  Abdomen: gravid uterus at 36 cm, non-tender, FHTs 130 - vertex on handheld US  Extremities: no edema    Supervision of other normal pregnancy, antepartum   at 36+4 weeks today. Pregnancy complicated by conditions as below. Declines flu shot. Tdap and RSV vaccines given. GBS negative.     History of cholestasis during pregnancy  Hx cholestasis with last pregnancy - discussed potential for recurrence this pregnancy (60-70%), will check labs as needed for symptoms    Hx of  section  Hx  for failed induction for cholestasis, repeat  scheduled for  at 7:15am at [facility] with Dr Amaya.    Hx of  delivery, currently pregnant  Prior delivery at 36+4 weeks d/t cholestasis, no indication for  progesterone.    Obesity affecting pregnancy, antepartum  Pregravid BMI 30.71 - total weight gain so far 39 lbs, goal weight gain <20 lbs. Discussed some tips for healthy diet in pregnancy; pt declined dietician referral.     Generalized anxiety disorder  Depression affecting pregnancy  Hx anxiety/depression. Currently taking zoloft 50mg daily.      Reviewed pre-dayne labs: O neg, invitae nml, girl    Follow up in 1 week for routine prenatal visit.     Dee Davidson MD  [facility]

## 2023-12-29 ENCOUNTER — MYC MEDICAL ADVICE (OUTPATIENT)
Dept: FAMILY MEDICINE | Facility: CLINIC | Age: 33
End: 2023-12-29
Payer: COMMERCIAL

## 2023-12-29 DIAGNOSIS — O21.9 NAUSEA AND VOMITING DURING PREGNANCY: ICD-10-CM

## 2024-01-02 ENCOUNTER — PRENATAL OFFICE VISIT (OUTPATIENT)
Dept: FAMILY MEDICINE | Facility: CLINIC | Age: 34
End: 2024-01-02
Payer: COMMERCIAL

## 2024-01-02 VITALS
DIASTOLIC BLOOD PRESSURE: 72 MMHG | SYSTOLIC BLOOD PRESSURE: 113 MMHG | WEIGHT: 224.6 LBS | TEMPERATURE: 98.1 F | BODY MASS INDEX: 37.42 KG/M2 | OXYGEN SATURATION: 98 % | HEIGHT: 65 IN | HEART RATE: 82 BPM | RESPIRATION RATE: 20 BRPM

## 2024-01-02 DIAGNOSIS — O26.893 RH NEGATIVE STATE IN ANTEPARTUM PERIOD, THIRD TRIMESTER: ICD-10-CM

## 2024-01-02 DIAGNOSIS — Z87.59 HISTORY OF CHOLESTASIS DURING PREGNANCY: ICD-10-CM

## 2024-01-02 DIAGNOSIS — O09.893 SUPERVISION OF OTHER HIGH RISK PREGNANCIES, THIRD TRIMESTER: Primary | ICD-10-CM

## 2024-01-02 DIAGNOSIS — O34.219 PREVIOUS CESAREAN SECTION COMPLICATING PREGNANCY: ICD-10-CM

## 2024-01-02 DIAGNOSIS — Z67.91 RH NEGATIVE STATE IN ANTEPARTUM PERIOD, THIRD TRIMESTER: ICD-10-CM

## 2024-01-02 DIAGNOSIS — Z87.19 HISTORY OF CHOLESTASIS DURING PREGNANCY: ICD-10-CM

## 2024-01-02 LAB
ANTIBODY SCREEN: NEGATIVE
SPECIMEN EXPIRATION DATE: NORMAL

## 2024-01-02 PROCEDURE — 86850 RBC ANTIBODY SCREEN: CPT | Performed by: FAMILY MEDICINE

## 2024-01-02 PROCEDURE — 99000 SPECIMEN HANDLING OFFICE-LAB: CPT | Performed by: FAMILY MEDICINE

## 2024-01-02 PROCEDURE — 80076 HEPATIC FUNCTION PANEL: CPT | Performed by: FAMILY MEDICINE

## 2024-01-02 PROCEDURE — 82239 BILE ACIDS TOTAL: CPT | Mod: 90 | Performed by: FAMILY MEDICINE

## 2024-01-02 PROCEDURE — 36415 COLL VENOUS BLD VENIPUNCTURE: CPT | Performed by: FAMILY MEDICINE

## 2024-01-02 PROCEDURE — 59426 ANTEPARTUM CARE ONLY: CPT | Performed by: FAMILY MEDICINE

## 2024-01-02 PROCEDURE — 96372 THER/PROPH/DIAG INJ SC/IM: CPT | Performed by: FAMILY MEDICINE

## 2024-01-02 NOTE — PATIENT INSTRUCTIONS
"If you feel that you are in labor, call directly to [facility] at [phone].      Week 37 of Your Pregnancy: Care Instructions    Most babies are born between 37 and 40 weeks.   This is a good time to pack a bag to take with you to the birth. Then it will be ready to go when you are.     Learn about breastfeeding.  For example, find out about ways to hold your baby to make breastfeeding easier. And think about learning how to pump and store milk.     Know that crying is normal.  It's common for babies to cry 1 to 3 hours a day. Some cry more, and some cry less.     Learn why babies cry.  They may be hungry; have gas; need a diaper change; or feel cold, warm, tired, lonely, or tense. Sometimes they cry for unknown reasons.     Think about what will help you stay calm when your baby cries.  Taking slow, deep breaths can help. So can taking a break. It's okay to put your baby somewhere safe (like their crib) and walk away for a few minutes.     Learn about safe sleep for your baby.  Always put your baby to sleep on their back. Place them alone in a crib or bassinet with a firm, flat surface. Keep soft items like stuffed animals out of the crib.     Learn what to expect with  poop.  Your baby will have their own bowel patterns. Some babies have several bowel movements a day. Some have fewer.     Know that  babies will often have loose, yellow bowel movements.  Formula-fed babies have more formed stools. If your baby's poop looks like pellets, your baby is constipated.   Follow-up care is a key part of your treatment and safety. Be sure to make and go to all appointments, and call your doctor if you are having problems. It's also a good idea to know your test results and keep a list of the medicines you take.  Where can you learn more?  Go to https://www.[facility].net/patiented  Enter N257 in the search box to learn more about \"Week 37 of Your Pregnancy: Care Instructions.\"  Current as " of: July 11, 2023               Content Version: 13.8    1215-6714 [employer].   Care instructions adapted under license by your healthcare professional. If you have questions about a medical condition or this instruction, always ask your healthcare professional. [employer] disclaims any warranty or liability for your use of this information.

## 2024-01-03 LAB
ALBUMIN SERPL BCG-MCNC: 3.6 G/DL (ref 3.5–5.2)
ALP SERPL-CCNC: 137 U/L (ref 40–150)
ALT SERPL W P-5'-P-CCNC: 15 U/L (ref 0–50)
AST SERPL W P-5'-P-CCNC: 16 U/L (ref 0–45)
BILIRUB DIRECT SERPL-MCNC: <0.2 MG/DL (ref 0–0.3)
BILIRUB SERPL-MCNC: 0.2 MG/DL
PROT SERPL-MCNC: 6.8 G/DL (ref 6.4–8.3)

## 2024-01-03 RX ORDER — ONDANSETRON 4 MG/1
4 TABLET, ORALLY DISINTEGRATING ORAL EVERY 8 HOURS PRN
Qty: 30 TABLET | Refills: 1 | Status: ON HOLD | OUTPATIENT
Start: 2024-01-03 | End: 2024-01-08

## 2024-01-04 LAB — BILE AC SERPL-SCNC: 2 UMOL/L

## 2024-01-07 ENCOUNTER — MYC MEDICAL ADVICE (OUTPATIENT)
Dept: FAMILY MEDICINE | Facility: CLINIC | Age: 34
End: 2024-01-07
Payer: COMMERCIAL

## 2024-01-07 PROBLEM — O09.893 SUPERVISION OF OTHER HIGH RISK PREGNANCIES, THIRD TRIMESTER: Status: ACTIVE | Noted: 2024-01-07

## 2024-01-07 PROBLEM — O34.219 PREVIOUS CESAREAN SECTION COMPLICATING PREGNANCY: Status: ACTIVE | Noted: 2024-01-07

## 2024-01-08 ENCOUNTER — ANESTHESIA EVENT (OUTPATIENT)
Dept: OBGYN | Facility: HOSPITAL | Age: 34
End: 2024-01-08
Payer: COMMERCIAL

## 2024-01-08 ENCOUNTER — HOSPITAL ENCOUNTER (INPATIENT)
Facility: HOSPITAL | Age: 34
LOS: 3 days | Discharge: HOME | End: 2024-01-11
Attending: FAMILY MEDICINE | Admitting: FAMILY MEDICINE
Payer: COMMERCIAL

## 2024-01-08 ENCOUNTER — NURSE TRIAGE (OUTPATIENT)
Dept: NURSING | Facility: CLINIC | Age: 34
End: 2024-01-08
Payer: COMMERCIAL

## 2024-01-08 ENCOUNTER — ANESTHESIA (OUTPATIENT)
Dept: OBGYN | Facility: HOSPITAL | Age: 34
End: 2024-01-08
Payer: COMMERCIAL

## 2024-01-08 DIAGNOSIS — Z98.891 S/P REPEAT LOW TRANSVERSE C-SECTION: ICD-10-CM

## 2024-01-08 DIAGNOSIS — O34.219 PREVIOUS CESAREAN SECTION COMPLICATING PREGNANCY: Primary | ICD-10-CM

## 2024-01-08 DIAGNOSIS — Z3A.38 38 WEEKS GESTATION OF PREGNANCY: ICD-10-CM

## 2024-01-08 PROBLEM — Z34.90 PREGNANCY: Status: ACTIVE | Noted: 2024-01-08

## 2024-01-08 LAB
ABO/RH(D): ABNORMAL
ANTIBODY ID: NORMAL
ANTIBODY SCREEN: POSITIVE
BLD PROD TYP BPU: NORMAL
BLD PROD TYP BPU: NORMAL
BLOOD COMPONENT TYPE: NORMAL
BLOOD COMPONENT TYPE: NORMAL
CODING SYSTEM: NORMAL
CODING SYSTEM: NORMAL
CROSSMATCH: NORMAL
CROSSMATCH: NORMAL
HGB BLD-MCNC: 12.6 G/DL (ref 11.7–15.7)
SPECIMEN EXPIRATION DATE: ABNORMAL
SPECIMEN EXPIRATION DATE: NORMAL
T PALLIDUM AB SER QL: NONREACTIVE
UNIT ABO/RH: NORMAL
UNIT ABO/RH: NORMAL
UNIT NUMBER: NORMAL
UNIT NUMBER: NORMAL
UNIT STATUS: NORMAL
UNIT STATUS: NORMAL
UNIT TYPE ISBT: 9500
UNIT TYPE ISBT: 9500

## 2024-01-08 PROCEDURE — 250N000009 HC RX 250: Performed by: PHYSICIAN ASSISTANT

## 2024-01-08 PROCEDURE — 250N000013 HC RX MED GY IP 250 OP 250 PS 637: Performed by: PHYSICIAN ASSISTANT

## 2024-01-08 PROCEDURE — 0UB70ZZ EXCISION OF BILATERAL FALLOPIAN TUBES, OPEN APPROACH: ICD-10-PCS | Performed by: OBSTETRICS & GYNECOLOGY

## 2024-01-08 PROCEDURE — 99221 1ST HOSP IP/OBS SF/LOW 40: CPT | Performed by: FAMILY MEDICINE

## 2024-01-08 PROCEDURE — 88302 TISSUE EXAM BY PATHOLOGIST: CPT | Mod: TC | Performed by: OBSTETRICS & GYNECOLOGY

## 2024-01-08 PROCEDURE — 86870 RBC ANTIBODY IDENTIFICATION: CPT | Performed by: PHYSICIAN ASSISTANT

## 2024-01-08 PROCEDURE — 86900 BLOOD TYPING SEROLOGIC ABO: CPT | Performed by: PHYSICIAN ASSISTANT

## 2024-01-08 PROCEDURE — 272N000001 HC OR GENERAL SUPPLY STERILE: Performed by: OBSTETRICS & GYNECOLOGY

## 2024-01-08 PROCEDURE — C9290 INJ, BUPIVACAINE LIPOSOME: HCPCS | Performed by: STUDENT IN AN ORGANIZED HEALTH CARE EDUCATION/TRAINING PROGRAM

## 2024-01-08 PROCEDURE — 250N000011 HC RX IP 250 OP 636: Performed by: STUDENT IN AN ORGANIZED HEALTH CARE EDUCATION/TRAINING PROGRAM

## 2024-01-08 PROCEDURE — 86780 TREPONEMA PALLIDUM: CPT | Performed by: PHYSICIAN ASSISTANT

## 2024-01-08 PROCEDURE — 250N000011 HC RX IP 250 OP 636: Performed by: NURSE ANESTHETIST, CERTIFIED REGISTERED

## 2024-01-08 PROCEDURE — 85018 HEMOGLOBIN: CPT | Performed by: PHYSICIAN ASSISTANT

## 2024-01-08 PROCEDURE — 86922 COMPATIBILITY TEST ANTIGLOB: CPT | Performed by: PHYSICIAN ASSISTANT

## 2024-01-08 PROCEDURE — 250N000011 HC RX IP 250 OP 636: Performed by: PHYSICIAN ASSISTANT

## 2024-01-08 PROCEDURE — 258N000003 HC RX IP 258 OP 636: Performed by: PHYSICIAN ASSISTANT

## 2024-01-08 PROCEDURE — 36415 COLL VENOUS BLD VENIPUNCTURE: CPT | Performed by: PHYSICIAN ASSISTANT

## 2024-01-08 PROCEDURE — 999N000249 HC STATISTIC C-SECTION ON UNIT

## 2024-01-08 PROCEDURE — 258N000003 HC RX IP 258 OP 636: Performed by: NURSE ANESTHETIST, CERTIFIED REGISTERED

## 2024-01-08 PROCEDURE — 250N000011 HC RX IP 250 OP 636: Performed by: OBSTETRICS & GYNECOLOGY

## 2024-01-08 PROCEDURE — 120N000001 HC R&B MED SURG/OB

## 2024-01-08 PROCEDURE — 370N000017 HC ANESTHESIA TECHNICAL FEE, PER MIN: Performed by: OBSTETRICS & GYNECOLOGY

## 2024-01-08 PROCEDURE — 250N000013 HC RX MED GY IP 250 OP 250 PS 637: Performed by: OBSTETRICS & GYNECOLOGY

## 2024-01-08 PROCEDURE — 88302 TISSUE EXAM BY PATHOLOGIST: CPT | Mod: 26 | Performed by: PATHOLOGY

## 2024-01-08 PROCEDURE — 360N000076 HC SURGERY LEVEL 3, PER MIN: Performed by: OBSTETRICS & GYNECOLOGY

## 2024-01-08 RX ORDER — ONDANSETRON 2 MG/ML
4 INJECTION INTRAMUSCULAR; INTRAVENOUS EVERY 30 MIN PRN
Status: CANCELLED | OUTPATIENT
Start: 2024-01-08

## 2024-01-08 RX ORDER — OXYTOCIN 10 [USP'U]/ML
10 INJECTION, SOLUTION INTRAMUSCULAR; INTRAVENOUS
Status: DISCONTINUED | OUTPATIENT
Start: 2024-01-08 | End: 2024-01-11 | Stop reason: HOSPADM

## 2024-01-08 RX ORDER — IBUPROFEN 800 MG/1
800 TABLET, FILM COATED ORAL EVERY 6 HOURS
Status: DISCONTINUED | OUTPATIENT
Start: 2024-01-09 | End: 2024-01-11 | Stop reason: HOSPADM

## 2024-01-08 RX ORDER — OXYTOCIN/0.9 % SODIUM CHLORIDE 30/500 ML
340 PLASTIC BAG, INJECTION (ML) INTRAVENOUS CONTINUOUS PRN
Status: DISCONTINUED | OUTPATIENT
Start: 2024-01-08 | End: 2024-01-08 | Stop reason: HOSPADM

## 2024-01-08 RX ORDER — SERTRALINE HYDROCHLORIDE 25 MG/1
25 TABLET, FILM COATED ORAL DAILY
Status: DISCONTINUED | OUTPATIENT
Start: 2024-01-09 | End: 2024-01-11 | Stop reason: HOSPADM

## 2024-01-08 RX ORDER — CEFAZOLIN SODIUM/WATER 2 G/20 ML
2 SYRINGE (ML) INTRAVENOUS SEE ADMIN INSTRUCTIONS
Status: DISCONTINUED | OUTPATIENT
Start: 2024-01-08 | End: 2024-01-08 | Stop reason: HOSPADM

## 2024-01-08 RX ORDER — OXYTOCIN 10 [USP'U]/ML
10 INJECTION, SOLUTION INTRAMUSCULAR; INTRAVENOUS
Status: DISCONTINUED | OUTPATIENT
Start: 2024-01-08 | End: 2024-01-08 | Stop reason: HOSPADM

## 2024-01-08 RX ORDER — MISOPROSTOL 200 UG/1
800 TABLET ORAL
Status: DISCONTINUED | OUTPATIENT
Start: 2024-01-08 | End: 2024-01-11 | Stop reason: HOSPADM

## 2024-01-08 RX ORDER — AMOXICILLIN 250 MG
2 CAPSULE ORAL 2 TIMES DAILY
Status: DISCONTINUED | OUTPATIENT
Start: 2024-01-08 | End: 2024-01-11 | Stop reason: HOSPADM

## 2024-01-08 RX ORDER — SODIUM CHLORIDE, SODIUM LACTATE, POTASSIUM CHLORIDE, CALCIUM CHLORIDE 600; 310; 30; 20 MG/100ML; MG/100ML; MG/100ML; MG/100ML
INJECTION, SOLUTION INTRAVENOUS CONTINUOUS
Status: CANCELLED | OUTPATIENT
Start: 2024-01-08

## 2024-01-08 RX ORDER — KETOROLAC TROMETHAMINE 30 MG/ML
30 INJECTION, SOLUTION INTRAMUSCULAR; INTRAVENOUS EVERY 6 HOURS
Status: COMPLETED | OUTPATIENT
Start: 2024-01-08 | End: 2024-01-09

## 2024-01-08 RX ORDER — CEFAZOLIN SODIUM/WATER 2 G/20 ML
2 SYRINGE (ML) INTRAVENOUS
Status: COMPLETED | OUTPATIENT
Start: 2024-01-08 | End: 2024-01-08

## 2024-01-08 RX ORDER — FENTANYL CITRATE 50 UG/ML
50 INJECTION, SOLUTION INTRAMUSCULAR; INTRAVENOUS EVERY 5 MIN PRN
Status: CANCELLED | OUTPATIENT
Start: 2024-01-08

## 2024-01-08 RX ORDER — NALOXONE HYDROCHLORIDE 0.4 MG/ML
0.2 INJECTION, SOLUTION INTRAMUSCULAR; INTRAVENOUS; SUBCUTANEOUS
Status: DISCONTINUED | OUTPATIENT
Start: 2024-01-08 | End: 2024-01-11 | Stop reason: HOSPADM

## 2024-01-08 RX ORDER — ACETAMINOPHEN 325 MG/1
975 TABLET ORAL ONCE
Status: COMPLETED | OUTPATIENT
Start: 2024-01-08 | End: 2024-01-08

## 2024-01-08 RX ORDER — OXYCODONE HYDROCHLORIDE 5 MG/1
5 TABLET ORAL EVERY 4 HOURS PRN
Status: DISCONTINUED | OUTPATIENT
Start: 2024-01-08 | End: 2024-01-11 | Stop reason: HOSPADM

## 2024-01-08 RX ORDER — FENTANYL CITRATE 50 UG/ML
25 INJECTION, SOLUTION INTRAMUSCULAR; INTRAVENOUS EVERY 5 MIN PRN
Status: CANCELLED | OUTPATIENT
Start: 2024-01-08

## 2024-01-08 RX ORDER — DEXTROSE, SODIUM CHLORIDE, SODIUM LACTATE, POTASSIUM CHLORIDE, AND CALCIUM CHLORIDE 5; .6; .31; .03; .02 G/100ML; G/100ML; G/100ML; G/100ML; G/100ML
INJECTION, SOLUTION INTRAVENOUS CONTINUOUS
Status: DISCONTINUED | OUTPATIENT
Start: 2024-01-08 | End: 2024-01-11 | Stop reason: HOSPADM

## 2024-01-08 RX ORDER — ONDANSETRON 4 MG/1
4 TABLET, ORALLY DISINTEGRATING ORAL EVERY 30 MIN PRN
Status: CANCELLED | OUTPATIENT
Start: 2024-01-08

## 2024-01-08 RX ORDER — MORPHINE SULFATE 1 MG/ML
INJECTION, SOLUTION EPIDURAL; INTRATHECAL; INTRAVENOUS
Status: DISCONTINUED | OUTPATIENT
Start: 2024-01-08 | End: 2024-01-08

## 2024-01-08 RX ORDER — METHYLERGONOVINE MALEATE 0.2 MG/ML
200 INJECTION INTRAVENOUS
Status: DISCONTINUED | OUTPATIENT
Start: 2024-01-08 | End: 2024-01-08 | Stop reason: HOSPADM

## 2024-01-08 RX ORDER — MISOPROSTOL 200 UG/1
400 TABLET ORAL
Status: DISCONTINUED | OUTPATIENT
Start: 2024-01-08 | End: 2024-01-11 | Stop reason: HOSPADM

## 2024-01-08 RX ORDER — HYDROCORTISONE 25 MG/G
CREAM TOPICAL 3 TIMES DAILY PRN
Status: DISCONTINUED | OUTPATIENT
Start: 2024-01-08 | End: 2024-01-11 | Stop reason: HOSPADM

## 2024-01-08 RX ORDER — HYDROMORPHONE HCL IN WATER/PF 6 MG/30 ML
0.2 PATIENT CONTROLLED ANALGESIA SYRINGE INTRAVENOUS EVERY 5 MIN PRN
Status: CANCELLED | OUTPATIENT
Start: 2024-01-08

## 2024-01-08 RX ORDER — MODIFIED LANOLIN
OINTMENT (GRAM) TOPICAL
Status: DISCONTINUED | OUTPATIENT
Start: 2024-01-08 | End: 2024-01-11 | Stop reason: HOSPADM

## 2024-01-08 RX ORDER — BUPIVACAINE HYDROCHLORIDE 7.5 MG/ML
INJECTION, SOLUTION INTRASPINAL
Status: DISCONTINUED | OUTPATIENT
Start: 2024-01-08 | End: 2024-01-08

## 2024-01-08 RX ORDER — LIDOCAINE 40 MG/G
CREAM TOPICAL
Status: DISCONTINUED | OUTPATIENT
Start: 2024-01-08 | End: 2024-01-08 | Stop reason: HOSPADM

## 2024-01-08 RX ORDER — OXYTOCIN/0.9 % SODIUM CHLORIDE 30/500 ML
100-340 PLASTIC BAG, INJECTION (ML) INTRAVENOUS CONTINUOUS PRN
Status: DISCONTINUED | OUTPATIENT
Start: 2024-01-08 | End: 2024-01-11 | Stop reason: HOSPADM

## 2024-01-08 RX ORDER — PROCHLORPERAZINE 25 MG
25 SUPPOSITORY, RECTAL RECTAL EVERY 12 HOURS PRN
Status: DISCONTINUED | OUTPATIENT
Start: 2024-01-08 | End: 2024-01-11 | Stop reason: HOSPADM

## 2024-01-08 RX ORDER — OXYTOCIN/0.9 % SODIUM CHLORIDE 30/500 ML
340 PLASTIC BAG, INJECTION (ML) INTRAVENOUS CONTINUOUS PRN
Status: DISCONTINUED | OUTPATIENT
Start: 2024-01-08 | End: 2024-01-11 | Stop reason: HOSPADM

## 2024-01-08 RX ORDER — AMOXICILLIN 250 MG
1 CAPSULE ORAL 2 TIMES DAILY
Status: DISCONTINUED | OUTPATIENT
Start: 2024-01-08 | End: 2024-01-11 | Stop reason: HOSPADM

## 2024-01-08 RX ORDER — MISOPROSTOL 200 UG/1
800 TABLET ORAL
Status: DISCONTINUED | OUTPATIENT
Start: 2024-01-08 | End: 2024-01-08 | Stop reason: HOSPADM

## 2024-01-08 RX ORDER — SODIUM CHLORIDE, SODIUM LACTATE, POTASSIUM CHLORIDE, CALCIUM CHLORIDE 600; 310; 30; 20 MG/100ML; MG/100ML; MG/100ML; MG/100ML
INJECTION, SOLUTION INTRAVENOUS CONTINUOUS PRN
Status: DISCONTINUED | OUTPATIENT
Start: 2024-01-08 | End: 2024-01-08

## 2024-01-08 RX ORDER — ONDANSETRON 2 MG/ML
4 INJECTION INTRAMUSCULAR; INTRAVENOUS EVERY 6 HOURS PRN
Status: DISCONTINUED | OUTPATIENT
Start: 2024-01-08 | End: 2024-01-11 | Stop reason: HOSPADM

## 2024-01-08 RX ORDER — OXYCODONE HYDROCHLORIDE 5 MG/1
5 TABLET ORAL
Status: CANCELLED | OUTPATIENT
Start: 2024-01-08

## 2024-01-08 RX ORDER — TRANEXAMIC ACID 10 MG/ML
1 INJECTION, SOLUTION INTRAVENOUS EVERY 30 MIN PRN
Status: DISCONTINUED | OUTPATIENT
Start: 2024-01-08 | End: 2024-01-11 | Stop reason: HOSPADM

## 2024-01-08 RX ORDER — LIDOCAINE 40 MG/G
CREAM TOPICAL
Status: DISCONTINUED | OUTPATIENT
Start: 2024-01-08 | End: 2024-01-11 | Stop reason: HOSPADM

## 2024-01-08 RX ORDER — ACETAMINOPHEN 325 MG/1
975 TABLET ORAL EVERY 6 HOURS
Status: DISCONTINUED | OUTPATIENT
Start: 2024-01-08 | End: 2024-01-11 | Stop reason: HOSPADM

## 2024-01-08 RX ORDER — CITRIC ACID/SODIUM CITRATE 334-500MG
30 SOLUTION, ORAL ORAL
Status: COMPLETED | OUTPATIENT
Start: 2024-01-08 | End: 2024-01-08

## 2024-01-08 RX ORDER — NALOXONE HYDROCHLORIDE 0.4 MG/ML
0.4 INJECTION, SOLUTION INTRAMUSCULAR; INTRAVENOUS; SUBCUTANEOUS
Status: DISCONTINUED | OUTPATIENT
Start: 2024-01-08 | End: 2024-01-11 | Stop reason: HOSPADM

## 2024-01-08 RX ORDER — METOCLOPRAMIDE HYDROCHLORIDE 5 MG/ML
10 INJECTION INTRAMUSCULAR; INTRAVENOUS EVERY 6 HOURS PRN
Status: DISCONTINUED | OUTPATIENT
Start: 2024-01-08 | End: 2024-01-11 | Stop reason: HOSPADM

## 2024-01-08 RX ORDER — CARBOPROST TROMETHAMINE 250 UG/ML
250 INJECTION, SOLUTION INTRAMUSCULAR
Status: DISCONTINUED | OUTPATIENT
Start: 2024-01-08 | End: 2024-01-11 | Stop reason: HOSPADM

## 2024-01-08 RX ORDER — CARBOPROST TROMETHAMINE 250 UG/ML
250 INJECTION, SOLUTION INTRAMUSCULAR
Status: DISCONTINUED | OUTPATIENT
Start: 2024-01-08 | End: 2024-01-08 | Stop reason: HOSPADM

## 2024-01-08 RX ORDER — SIMETHICONE 80 MG
80 TABLET,CHEWABLE ORAL 4 TIMES DAILY PRN
Status: DISCONTINUED | OUTPATIENT
Start: 2024-01-08 | End: 2024-01-11 | Stop reason: HOSPADM

## 2024-01-08 RX ORDER — NALBUPHINE HYDROCHLORIDE 20 MG/ML
20 INJECTION, SOLUTION INTRAMUSCULAR; INTRAVENOUS; SUBCUTANEOUS EVERY 4 HOURS PRN
Status: DISCONTINUED | OUTPATIENT
Start: 2024-01-08 | End: 2024-01-09

## 2024-01-08 RX ORDER — METHYLERGONOVINE MALEATE 0.2 MG/ML
200 INJECTION INTRAVENOUS
Status: DISCONTINUED | OUTPATIENT
Start: 2024-01-08 | End: 2024-01-11 | Stop reason: HOSPADM

## 2024-01-08 RX ORDER — SODIUM CHLORIDE, SODIUM LACTATE, POTASSIUM CHLORIDE, CALCIUM CHLORIDE 600; 310; 30; 20 MG/100ML; MG/100ML; MG/100ML; MG/100ML
INJECTION, SOLUTION INTRAVENOUS CONTINUOUS
Status: DISCONTINUED | OUTPATIENT
Start: 2024-01-08 | End: 2024-01-08 | Stop reason: HOSPADM

## 2024-01-08 RX ORDER — GLYCOPYRROLATE 0.2 MG/ML
INJECTION, SOLUTION INTRAMUSCULAR; INTRAVENOUS PRN
Status: DISCONTINUED | OUTPATIENT
Start: 2024-01-08 | End: 2024-01-08

## 2024-01-08 RX ORDER — PROCHLORPERAZINE MALEATE 10 MG
10 TABLET ORAL EVERY 6 HOURS PRN
Status: DISCONTINUED | OUTPATIENT
Start: 2024-01-08 | End: 2024-01-11 | Stop reason: HOSPADM

## 2024-01-08 RX ORDER — TRANEXAMIC ACID 10 MG/ML
1 INJECTION, SOLUTION INTRAVENOUS EVERY 30 MIN PRN
Status: DISCONTINUED | OUTPATIENT
Start: 2024-01-08 | End: 2024-01-08 | Stop reason: HOSPADM

## 2024-01-08 RX ORDER — HYDROMORPHONE HCL IN WATER/PF 6 MG/30 ML
0.4 PATIENT CONTROLLED ANALGESIA SYRINGE INTRAVENOUS EVERY 5 MIN PRN
Status: CANCELLED | OUTPATIENT
Start: 2024-01-08

## 2024-01-08 RX ORDER — ONDANSETRON 4 MG/1
4 TABLET, ORALLY DISINTEGRATING ORAL EVERY 6 HOURS PRN
Status: DISCONTINUED | OUTPATIENT
Start: 2024-01-08 | End: 2024-01-11 | Stop reason: HOSPADM

## 2024-01-08 RX ORDER — OXYCODONE HYDROCHLORIDE 5 MG/1
10 TABLET ORAL
Status: CANCELLED | OUTPATIENT
Start: 2024-01-08

## 2024-01-08 RX ORDER — BUPIVACAINE HYDROCHLORIDE 2.5 MG/ML
INJECTION, SOLUTION EPIDURAL; INFILTRATION; INTRACAUDAL
Status: DISCONTINUED | OUTPATIENT
Start: 2024-01-08 | End: 2024-01-08

## 2024-01-08 RX ORDER — BISACODYL 10 MG
10 SUPPOSITORY, RECTAL RECTAL DAILY PRN
Status: DISCONTINUED | OUTPATIENT
Start: 2024-01-10 | End: 2024-01-11 | Stop reason: HOSPADM

## 2024-01-08 RX ORDER — MISOPROSTOL 200 UG/1
400 TABLET ORAL
Status: DISCONTINUED | OUTPATIENT
Start: 2024-01-08 | End: 2024-01-08 | Stop reason: HOSPADM

## 2024-01-08 RX ORDER — METOCLOPRAMIDE 10 MG/1
10 TABLET ORAL EVERY 6 HOURS PRN
Status: DISCONTINUED | OUTPATIENT
Start: 2024-01-08 | End: 2024-01-11 | Stop reason: HOSPADM

## 2024-01-08 RX ADMIN — GLYCOPYRROLATE 0.3 MCG: 0.2 INJECTION INTRAMUSCULAR; INTRAVENOUS at 13:14

## 2024-01-08 RX ADMIN — Medication 0.15 MG: at 13:08

## 2024-01-08 RX ADMIN — SODIUM CHLORIDE, POTASSIUM CHLORIDE, SODIUM LACTATE AND CALCIUM CHLORIDE 500 ML: 600; 310; 30; 20 INJECTION, SOLUTION INTRAVENOUS at 11:58

## 2024-01-08 RX ADMIN — SENNOSIDES AND DOCUSATE SODIUM 1 TABLET: 50; 8.6 TABLET ORAL at 21:43

## 2024-01-08 RX ADMIN — SODIUM CHLORIDE, POTASSIUM CHLORIDE, SODIUM LACTATE AND CALCIUM CHLORIDE: 600; 310; 30; 20 INJECTION, SOLUTION INTRAVENOUS at 13:39

## 2024-01-08 RX ADMIN — PHENYLEPHRINE HYDROCHLORIDE 100 MCG: 10 INJECTION INTRAVENOUS at 13:15

## 2024-01-08 RX ADMIN — BUPIVACAINE 20 ML: 13.3 INJECTION, SUSPENSION, LIPOSOMAL INFILTRATION at 13:57

## 2024-01-08 RX ADMIN — PHENYLEPHRINE HYDROCHLORIDE 0.3 MCG/KG/MIN: 10 INJECTION INTRAVENOUS at 13:14

## 2024-01-08 RX ADMIN — BUPIVACAINE HYDROCHLORIDE IN DEXTROSE 1.6 ML: 7.5 INJECTION, SOLUTION SUBARACHNOID at 13:08

## 2024-01-08 RX ADMIN — ACETAMINOPHEN 975 MG: 325 TABLET ORAL at 11:59

## 2024-01-08 RX ADMIN — Medication 320 ML/HR: at 13:26

## 2024-01-08 RX ADMIN — ACETAMINOPHEN 975 MG: 325 TABLET ORAL at 18:22

## 2024-01-08 RX ADMIN — SODIUM CITRATE AND CITRIC ACID MONOHYDRATE 30 ML: 500; 334 SOLUTION ORAL at 11:59

## 2024-01-08 RX ADMIN — KETOROLAC TROMETHAMINE 30 MG: 30 INJECTION, SOLUTION INTRAMUSCULAR; INTRAVENOUS at 21:44

## 2024-01-08 RX ADMIN — NALBUPHINE HYDROCHLORIDE 20 MG: 20 INJECTION, SOLUTION INTRAMUSCULAR; INTRAVENOUS; SUBCUTANEOUS at 19:47

## 2024-01-08 RX ADMIN — Medication 2 G: at 13:19

## 2024-01-08 RX ADMIN — SODIUM CHLORIDE, POTASSIUM CHLORIDE, SODIUM LACTATE AND CALCIUM CHLORIDE: 600; 310; 30; 20 INJECTION, SOLUTION INTRAVENOUS at 13:01

## 2024-01-08 RX ADMIN — OXYCODONE HYDROCHLORIDE 5 MG: 5 TABLET ORAL at 21:44

## 2024-01-08 RX ADMIN — BUPIVACAINE HYDROCHLORIDE 20 ML: 2.5 INJECTION, SOLUTION EPIDURAL; INFILTRATION; INTRACAUDAL at 13:57

## 2024-01-08 ASSESSMENT — ACTIVITIES OF DAILY LIVING (ADL)
ADLS_ACUITY_SCORE: 18
ADLS_ACUITY_SCORE: 18
ADLS_ACUITY_SCORE: 22
ADLS_ACUITY_SCORE: 22
ADLS_ACUITY_SCORE: 35
ADLS_ACUITY_SCORE: 18
ADLS_ACUITY_SCORE: 18

## 2024-01-08 NOTE — CONSULTS
Patient is a 32yo  @ 38 3/7wks.   RH neg.   Planning a repeat c.s on Friday with Dr. Amaya.  Arrives with painful contractions q 3-4 mins.      Dr. Davidson on way in.     Will move forward with RLTCS and tubal ligation once patient is ready for surgery.  NPO.     Essence Perkins DO, FACOG  2024 11:37 AM

## 2024-01-08 NOTE — PROGRESS NOTES
Pt here for lower abdominal and back pain. She is scheduled C/S this Friday 01/12/2024. She has had this pain for couple weeks now. She denies having vaginal leakage.  She is suzanne q 3-4 mins, mild per palpation. She has a category one tracing.  Dr Davidson was notified about pt's contractions status. She would come in to evaluate pt.   Dr Perkins also was notified about pt's contractions status and elective repeat c/s 01/12/2024.

## 2024-01-08 NOTE — ANESTHESIA PREPROCEDURE EVALUATION
Anesthesia Pre-Procedure Evaluation    Patient: Marisol Licea   MRN: [id number] : 1990        Procedure : Procedure(s):  REPEAT  SECTION AND BILATERAL TUBAL LIGATION          Past Medical History:   Diagnosis Date    Encounter for induction of labor 2022    Failed induction of labor 2022    S/P  2022      Past Surgical History:   Procedure Laterality Date     SECTION N/A 2022    Procedure:  SECTION;  Surgeon: Essence Perkins MD;  Location: [facility] OR    HEMORRHOIDECTOMY      LAPAROSCOPIC HERNIORRHAPHY UMBILICAL N/A 3/9/2023    Procedure: HERNIORRHAPHY, UMBILICAL, LAPAROSCOPIC;  Surgeon: Sahil Méndez MD;  Location: [facility] [place] OR      No Known Allergies   Social History     Tobacco Use    Smoking status: Former     Packs/day: .25     Types: Cigarettes     Quit date: 2022     Years since quittin.8     Passive exposure: Never    Smokeless tobacco: Never   Substance Use Topics    Alcohol use: Not Currently      Wt Readings from Last 1 Encounters:   24 100.2 kg (221 lb)        Anesthesia Evaluation   Pt has had prior anesthetic.     No history of anesthetic complications       ROS/MED HX  ENT/Pulmonary:  - neg pulmonary ROS     Neurologic:  - neg neurologic ROS     Cardiovascular:  - neg cardiovascular ROS     METS/Exercise Tolerance:     Hematologic:  - neg hematologic  ROS     Musculoskeletal:       GI/Hepatic:     (+) GERD,                   Renal/Genitourinary:       Endo:     (+)               Obesity,       Psychiatric/Substance Use:  - neg psychiatric ROS     Infectious Disease:       Malignancy:       Other:      (+)  , ,previous          Physical Exam    Airway        Mallampati: II   TM distance: > 3 FB   Neck ROM: full   Mouth opening: > 3 cm    Respiratory Devices and Support         Dental  no notable dental history         Cardiovascular   cardiovascular exam normal          Pulmonary  "  pulmonary exam normal                OUTSIDE LABS:  CBC:   Lab Results   Component Value Date    WBC 7.5 10/19/2023    WBC 5.9 06/23/2023    HGB 12.6 01/08/2024    HGB 11.5 (L) 10/19/2023    HCT 32.8 (L) 10/19/2023    HCT 34.4 (L) 06/23/2023     10/19/2023     06/23/2023     BMP:   Lab Results   Component Value Date     02/10/2023     01/07/2019    POTASSIUM 4.2 02/10/2023    POTASSIUM 3.5 01/07/2019    CHLORIDE 102 02/10/2023    CHLORIDE 105 01/07/2019    CO2 22 02/10/2023    CO2 23 01/07/2019    BUN 9.1 02/10/2023    BUN 6 (L) 01/07/2019    CR 0.88 02/10/2023    CR 0.85 01/07/2019    GLC 92 02/10/2023     01/07/2019     COAGS: No results found for: \"PTT\", \"INR\", \"FIBR\"  POC:   Lab Results   Component Value Date    HCG Positive (A) 05/10/2023    HCGS Negative 03/09/2023     HEPATIC:   Lab Results   Component Value Date    ALBUMIN 3.6 01/02/2024    PROTTOTAL 6.8 01/02/2024    ALT 15 01/02/2024    AST 16 01/02/2024    ALKPHOS 137 01/02/2024    BILITOTAL 0.2 01/02/2024     OTHER:   Lab Results   Component Value Date    A1C 4.9 06/23/2023    CLARISSA 9.4 02/10/2023    MAG 1.8 09/07/2022    LIPASE 14 01/07/2019    TSH 0.99 09/07/2022       Anesthesia Plan    ASA Status:  2       Anesthesia Type: Spinal.              Consents    Anesthesia Plan(s) and associated risks, benefits, and realistic alternatives discussed. Questions answered and patient/representative(s) expressed understanding.     - Discussed:     - Discussed with:  Patient            Postoperative Care            Comments:    Other Comments: 150 mcg intrathecal duramorph.         neg OB ROS.      John Arenas MD    I have reviewed the pertinent notes and labs in the chart from the past 30 days and (re)examined the patient.  Any updates or changes from those notes are reflected in this note.                  "

## 2024-01-08 NOTE — H&P
OB Admission H&P  Date: 2024  Time: 12:25 PM  Marisol Licea,  1990, MRN [id number]    CC: contractions     HPI: Marisol Licea is a 33 year old year old  at 38w3d weeks by [address] [place] [address] presenting with contractions. She denies leakage of fluid and vaginal bleeding and reports good fetal movement. Pt had repeat c/s scheduled for Friday.     Pregnancy complicated by: hx cholestasis, hx , hx  delivery, obesity, anxiety/depression  on selective serotonin reuptake inhibitor     ROS: She denies fevers, chills, chest pain, shortness of breath, nausea, vomiting, or dysuria    OB History  OB History    Para Term  AB Living   2 1 0 1 0 1   SAB IAB Ectopic Multiple Live Births   0 0 0 0 1      # Outcome Date GA Lbr Joselito/2nd Weight Sex Delivery Anes PTL Lv   2 Current            1  22 36w5d  3.75 kg (8 lb 4.3 oz) M CS-LTranv EPI  PEDRO      Complications: Failure to Progress in First Stage      Name: MARKO LICEA      Apgar1: 9  Apgar5: 9       Medical History:  Past Medical History:   Diagnosis Date    Encounter for induction of labor 2022    Failed induction of labor 2022    S/P  2022     Surgical History:   has a past surgical history that includes hemorrhoidectomy;  section (N/A, 2022); and Laparoscopic herniorrhaphy umbilical (N/A, 3/9/2023).    Social History  Reviewed, and she  reports that she quit smoking about 22 months ago. Her smoking use included cigarettes. She smoked an average of 0.3 packs per day. She has never been exposed to tobacco smoke. She has never used smokeless tobacco. She reports that she does not currently use alcohol. She reports that she does not currently use drugs.    Family History:    No Known Allergies    Medications Prior to Admission   Medication Sig Dispense Refill Last Dose    hydrOXYzine (ATARAX) 25 MG tablet Take 1-2 tablets (25-50 mg) by mouth nightly as needed  "(insomnia, sleep issues) 60 tablet 8     Magnesium Oxide, Laxative, (LAX) 500 MG TABS tablet Take 400 mg by mouth daily       Prenatal Vit-Fe Fumarate-FA (PRENATAL MULTIVITAMIN W/IRON) 27-0.8 MG tablet Take 1 tablet by mouth daily       sertraline (ZOLOFT) 25 MG tablet Take 1 tablet (25 mg) by mouth daily 90 tablet 3        Physical Exam:       Ht 1.651 m (5' 5\")   Wt 100.2 kg (221 lb)   LMP  (LMP Unknown)   BMI 36.78 kg/m      Gen: no acute distress, resting comfortably   CV: acyanotic   Pulm: unlabored respirations   Abd: gravid, soft, nontender   Extremities: soft, nontender    FHR: 130, mod lou, +accels, -decels  [place]:  q3-4min    Pertinent Labs  Admission on 01/08/2024   Component Date Value Ref Range Status    Hemoglobin 01/08/2024 12.6  11.7 - 15.7 g/dL Final    SPECIMEN EXPIRATION DATE 01/08/2024 [id number]   Preliminary   Prenatal Office Visit on 01/02/2024   Component Date Value Ref Range Status    Antibody Screen 01/02/2024 Negative  Negative Final    SPECIMEN EXPIRATION DATE 01/02/2024 [id number]   Final    Protein Total 01/02/2024 6.8  6.4 - 8.3 g/dL Final    Albumin 01/02/2024 3.6  3.5 - 5.2 g/dL Final    Bilirubin Total 01/02/2024 0.2  <=1.2 mg/dL Final    Alkaline Phosphatase 01/02/2024 137  40 - 150 U/L Final    Reference intervals for this test were updated on 11/14/2023 to more accurately reflect our healthy population. There may be differences in the flagging of prior results with similar values performed with this method. Interpretation of those prior results can be made in the context of the updated reference intervals.    AST 01/02/2024 16  0 - 45 U/L Final    Reference intervals for this test were updated on 6/12/2023 to more accurately reflect our healthy population. There may be differences in the flagging of prior results with similar values performed with this method. Interpretation of those prior results can be made in the context of the updated reference intervals.    ALT " 01/02/2024 15  0 - 50 U/L Final    Reference intervals for this test were updated on 6/12/2023 to more accurately reflect our healthy population. There may be differences in the flagging of prior results with similar values performed with this method. Interpretation of those prior results can be made in the context of the updated reference intervals.      Bilirubin Direct 01/02/2024 <0.20  0.00 - 0.30 mg/dL Final    Bile Acids Total 01/02/2024 2  0 - 10 umol/L Final    INTERPRETIVE INFORMATION: Bile Acids, Total    Reference Interval applies to fasting specimens.  Performed By: [facility]  [address] [place] [address]  : Sonny Huber MD, PhD  CLIA Number: [id number]   Prenatal Office Visit on 12/19/2023   Component Date Value Ref Range Status    Group B Strep PCR 12/19/2023 Negative  Negative Final    Presumed negative for Streptococcus agalactiae (Group B Streptococcus) or the number of organisms may be below the limit of detection of the assay.    Bile Acids Total 12/19/2023 4  0 - 10 umol/L Final    INTERPRETIVE INFORMATION: Bile Acids, Total    Reference Interval applies to fasting specimens.  Performed By: [facility]  [address] [place] [address]  : Sonny Huber MD, PhD  CLIA Number: [id number]    Protein Total 12/19/2023 6.6  6.4 - 8.3 g/dL Final    Albumin 12/19/2023 3.6  3.5 - 5.2 g/dL Final    Bilirubin Total 12/19/2023 0.2  <=1.2 mg/dL Final    Alkaline Phosphatase 12/19/2023 118  40 - 150 U/L Final    Reference intervals for this test were updated on 11/14/2023 to more accurately reflect our healthy population. There may be differences in the flagging of prior results with similar values performed with this method. Interpretation of those prior results can be made in the context of the updated reference intervals.    AST 12/19/2023 16  0 - 45 U/L Final    Reference intervals for this test were updated on 6/12/2023  to more accurately reflect our healthy population. There may be differences in the flagging of prior results with similar values performed with this method. Interpretation of those prior results can be made in the context of the updated reference intervals.    ALT 12/19/2023 12  0 - 50 U/L Final    Reference intervals for this test were updated on 6/12/2023 to more accurately reflect our healthy population. There may be differences in the flagging of prior results with similar values performed with this method. Interpretation of those prior results can be made in the context of the updated reference intervals.      Bilirubin Direct 12/19/2023 <0.20  0.00 - 0.30 mg/dL Final   Prenatal Office Visit on 11/07/2023   Component Date Value Ref Range Status    Bile Acids Total 11/07/2023 3  0 - 10 umol/L Final    INTERPRETIVE INFORMATION: Bile Acids, Total    Reference Interval applies to fasting specimens.  Performed By: [facility]  [address] [place] [id number]  : Sonny Huber MD, PhD  CLIA Number: [id number]    Protein Total 11/07/2023 6.5  6.4 - 8.3 g/dL Final    Albumin 11/07/2023 3.5  3.5 - 5.2 g/dL Final    Bilirubin Total 11/07/2023 <0.2  <=1.2 mg/dL Final    Alkaline Phosphatase 11/07/2023 69  35 - 104 U/L Final    AST 11/07/2023 14  0 - 45 U/L Final    Reference intervals for this test were updated on 6/12/2023 to more accurately reflect our healthy population. There may be differences in the flagging of prior results with similar values performed with this method. Interpretation of those prior results can be made in the context of the updated reference intervals.    ALT 11/07/2023 12  0 - 50 U/L Final    Reference intervals for this test were updated on 6/12/2023 to more accurately reflect our healthy population. There may be differences in the flagging of prior results with similar values performed with this method. Interpretation of those prior results can be made in  the context of the updated reference intervals.      Bilirubin Direct 11/07/2023 <0.20  0.00 - 0.30 mg/dL Final   Prenatal Office Visit on 10/19/2023   Component Date Value Ref Range Status    Glu Gest Screen 1hr 50g 10/19/2023 107  70 - 129 mg/dL Final    WBC Count 10/19/2023 7.5  4.0 - 11.0 10e3/uL Final    RBC Count 10/19/2023 3.67 (L)  3.80 - 5.20 10e6/uL Final    Hemoglobin 10/19/2023 11.5 (L)  11.7 - 15.7 g/dL Final    Hematocrit 10/19/2023 32.8 (L)  35.0 - 47.0 % Final    MCV 10/19/2023 89  78 - 100 fL Final    MCH 10/19/2023 31.3  26.5 - 33.0 pg Final    MCHC 10/19/2023 35.1  31.5 - 36.5 g/dL Final    RDW 10/19/2023 12.8  10.0 - 15.0 % Final    Platelet Count 10/19/2023 287  150 - 450 10e3/uL Final    Treponema Antibody Total 10/19/2023 Nonreactive  Nonreactive Final   Prenatal Office Visit on 09/21/2023   Component Date Value Ref Range Status    Trichomonas 09/21/2023 Absent  Absent Final    Yeast 09/21/2023 Present (A)  Absent Final    Clue Cells 09/21/2023 Absent  Absent Final    WBCs/high power field 09/21/2023 None  None Final   Office Visit on 07/20/2023   Component Date Value Ref Range Status    Group A Strep antigen 07/20/2023 Negative  Negative Final    Group A strep by PCR 07/20/2023 Not Detected  Not Detected Final   Prenatal Office Visit on 07/18/2023   Component Date Value Ref Range Status    Color Urine 07/18/2023 Yellow  Colorless, Straw, Light Yellow, Yellow Final    Appearance Urine 07/18/2023 Slightly Cloudy (A)  Clear Final    Glucose Urine 07/18/2023 Negative  Negative mg/dL Final    Bilirubin Urine 07/18/2023 Negative  Negative Final    Ketones Urine 07/18/2023 Negative  Negative mg/dL Final    Specific Gravity Urine 07/18/2023 1.025  1.005 - 1.030 Final    Blood Urine 07/18/2023 Negative  Negative Final    pH Urine 07/18/2023 7.0  5.0 - 8.0 Final    Protein Albumin Urine 07/18/2023 Negative  Negative mg/dL Final    Urobilinogen Urine 07/18/2023 0.2  0.2, 1.0 E.U./dL Final    Nitrite  Urine 07/18/2023 Negative  Negative Final    Leukocyte Esterase Urine 07/18/2023 Negative  Negative Final    Bacteria Urine 07/18/2023 Few (A)  None Seen /HPF Final    RBC Urine 07/18/2023 None Seen  0-2 /HPF /HPF Final    WBC Urine 07/18/2023 None Seen  0-5 /HPF /HPF Final    Squamous Epithelials Urine 07/18/2023 Few (A)  None Seen /LPF Final    Mucus Urine 07/18/2023 Present (A)  None Seen /LPF Final    Amorphous Crystals Urine 07/18/2023 Moderate (A)  None Seen /HPF Final   Lab on 06/23/2023   Component Date Value Ref Range Status    Hepatitis B Surface Antigen 06/23/2023 Nonreactive  Nonreactive Final    WBC Count 06/23/2023 5.9  4.0 - 11.0 10e3/uL Final    RBC Count 06/23/2023 3.94  3.80 - 5.20 10e6/uL Final    Hemoglobin 06/23/2023 12.0  11.7 - 15.7 g/dL Final    Hematocrit 06/23/2023 34.4 (L)  35.0 - 47.0 % Final    MCV 06/23/2023 87  78 - 100 fL Final    MCH 06/23/2023 30.5  26.5 - 33.0 pg Final    MCHC 06/23/2023 34.9  31.5 - 36.5 g/dL Final    RDW 06/23/2023 12.0  10.0 - 15.0 % Final    Platelet Count 06/23/2023 282  150 - 450 10e3/uL Final    HIV Antigen Antibody Combo 06/23/2023 Nonreactive  Nonreactive Final    HIV-1 p24 Ag & HIV-1/HIV-2 Ab Not Detected    Rubella Jane IgG Instrument Value 06/23/2023 2.90  <0.90 Index Final    Rubella Antibody IgG 06/23/2023 Positive   Final    Suggests previous exposure or immunization and probable immunity.    Treponema Antibody Total 06/23/2023 Nonreactive  Nonreactive Final    See Scanned Result 06/23/2023 INVITAE NON-INVASIVE PRENATAL SCREENING-Scanned   Final    Vitamin D, Total (25-Hydroxy) 06/23/2023 33  20 - 75 ug/L Final    Hemoglobin A1C 06/23/2023 4.9  0.0 - 5.6 % Final    Normal <5.7%   Prediabetes 5.7-6.4%    Diabetes 6.5% or higher     Note: Adopted from ADA consensus guidelines.    ABO/RH(D) 06/23/2023 O NEG   Final    Antibody Screen 06/23/2023 Negative  Negative Final    SPECIMEN EXPIRATION DATE 06/23/2023 [id number]   Final    Culture 06/23/2023  50,000-100,000 CFU/mL Mixture of urogenital nisa   Final    Color Urine 06/23/2023 Yellow  Colorless, Straw, Light Yellow, Yellow Final    Appearance Urine 06/23/2023 Clear  Clear Final    Glucose Urine 06/23/2023 Negative  Negative mg/dL Final    Bilirubin Urine 06/23/2023 Negative  Negative Final    Ketones Urine 06/23/2023 Negative  Negative mg/dL Final    Specific Gravity Urine 06/23/2023 1.025  1.005 - 1.030 Final    Blood Urine 06/23/2023 Trace (A)  Negative Final    pH Urine 06/23/2023 6.0  5.0 - 8.0 Final    Protein Albumin Urine 06/23/2023 Negative  Negative mg/dL Final    Urobilinogen Urine 06/23/2023 0.2  0.2, 1.0 E.U./dL Final    Nitrite Urine 06/23/2023 Negative  Negative Final    Leukocyte Esterase Urine 06/23/2023 Small (A)  Negative Final   Prenatal Office Visit on 06/20/2023   Component Date Value Ref Range Status    Trichomonas 06/20/2023 Absent  Absent Final    Yeast 06/20/2023 Present (A)  Absent Final    Clue Cells 06/20/2023 Absent  Absent Final    WBCs/high power field 06/20/2023 4+ (A)  None Final    Interpretation 06/20/2023 Negative for Intraepithelial Lesion or Malignancy (NILM)    Final    Comment 06/20/2023    Final                    Value:This result contains rich text formatting which cannot be displayed here.    Specimen Adequacy 06/20/2023 Satisfactory for evaluation, endocervical/transformation zone component absent   Final    Clinical Information 06/20/2023    Final                    Value:This result contains rich text formatting which cannot be displayed here.    Reflex Testing 06/20/2023 Yes regardless of result   Final    Previous Abnormal? 06/20/2023    Final                    Value:This result contains rich text formatting which cannot be displayed here.    Performing Labs 06/20/2023    Final                    Value:This result contains rich text formatting which cannot be displayed here.    Chlamydia trachomatis 06/20/2023 Negative  Negative Final    A negative result  by transcription mediated amplification does not preclude the presence of C. trachomatis infection because results are dependent on proper and adequate collection, absence of inhibitors and sufficient rRNA to be detected.    Neisseria gonorrhoeae 2023 Negative  Negative Final    Negative for N. gonorrhoeae rRNA by transcription mediated amplification. A negative result by transcription mediated amplification does not preclude the presence of C. trachomatis infection because results are dependent on proper and adequate collection, absence of inhibitors and sufficient rRNA to be detected.    Other HR HPV 2023 Positive (A)  Negative Final    HPV16 DNA 2023 Negative  Negative Final    HPV18 DNA 2023 Negative  Negative Final    FINAL DIAGNOSIS 2023    Final                    Value:This result contains rich text formatting which cannot be displayed here.   There may be more visits with results that are not included.       Prenatal Labs  Blood type: O neg  GBS neg   Rubella immune, Hep B negative, HIV negative, RPR non-reactive   GC / CT negative   1 hr GCT passed     Impression:  Marisol Licea is a 33 year old year old  at 38w3d weeks admitted for contractions - will do repeat  today at 1pm with Dr Perkins. Pregnancy complicated by obesity, hx   for cholestasis.     Plan:  1. Early Labor/contractions   Repeat  today - I will assist for visualization, retraction and delivery of fetus  2. Hx anxiety/depression - PTA zoloft ordered       Dee Davidson MD  [facility]

## 2024-01-08 NOTE — ANESTHESIA PROCEDURE NOTES
"Intrathecal injection Procedure Note    Pre-Procedure   Staff -        Anesthesiologist:  John Arenas MD       Performed By: anesthesiologist       Location: OR       Procedure Start/Stop Times: 1/8/2024 1:05 PM and 1/8/2024 1:08 PM       Pre-Anesthestic Checklist: patient identified, IV checked, risks and benefits discussed, informed consent, monitors and equipment checked, pre-op evaluation, at physician/surgeon's request and post-op pain management  Timeout:       Correct Patient: Yes        Correct Procedure: Yes        Correct Site: Yes        Correct Position: Yes   Procedure Documentation  Procedure: intrathecal injection       Patient Position: sitting       Skin prep: Chloraprep       Insertion Site: L3-4. (midline approach).       Needle Gauge: 24.        Needle Length (Inches): 3.5        Spinal Needle Type: Pencan       Introducer used       Introducer: 20 G       # of attempts:  # of redirects:  0    Assessment/Narrative         Paresthesias: No.       CSF fluid: clear.       Opening pressure was cmH2O while  Sitting.      Medication(s) Administered   0.75% Hyperbaric Bupivacaine (Intrathecal) - Intrathecal   1.6 mL - 1/8/2024 1:08:00 PM  Morphine PF 1 mg/mL (Intrathecal) - Intrathecal   0.15 mg - 1/8/2024 1:08:00 PM  Medication Administration Time: 1/8/2024 1:05 PM      FOR [facility] ([facility]) ONLY:   Pain Team Contact information: please page the Pain Team Via [employer]. Search \"Pain\". During daytime hours, please page the attending first. At night please page the resident first.      "

## 2024-01-08 NOTE — ANESTHESIA CARE TRANSFER NOTE
Patient: Marisol Licea    Procedure: Procedure(s):  REPEAT  SECTION AND BILATERAL TUBAL LIGATION       Diagnosis: History of  section [Z98.891]  Request for sterilization [Z30.2]  Diagnosis Additional Information: No value filed.    Anesthesia Type:   Spinal     Note:      Level of Consciousness: awake  Oxygen Supplementation: room air    Independent Airway: airway patency satisfactory and stable  Dentition: dentition unchanged  Vital Signs Stable: post-procedure vital signs reviewed and stable  Report to RN Given: handoff report given  Patient transferred to: Labor and Delivery    Handoff Report: Identifed the Patient, Identified the Reponsible Provider, Reviewed the pertinent medical history, Discussed the surgical course, Reviewed Intra-OP anesthesia mangement and issues during anesthesia, Set expectations for post-procedure period and Allowed opportunity for questions and acknowledgement of understanding      Vitals:  Vitals Value Taken Time   /70 24 1404   Temp     Pulse 77    Resp 22    SpO2 98 % 24 1403   Vitals shown include unfiled device data.    Electronically Signed By: MILKA Rodarte CRNA  2024  2:05 PM

## 2024-01-08 NOTE — ANESTHESIA PROCEDURE NOTES
"TAP Procedure Note    Pre-Procedure   Staff -        Anesthesiologist:  John Arenas MD       Performed By: anesthesiologist       Location: OR       Pre-Anesthestic Checklist: patient identified, IV checked, site marked, risks and benefits discussed, informed consent, monitors and equipment checked, pre-op evaluation, at physician/surgeon's request and post-op pain management  Timeout:       Correct Patient: Yes        Correct Procedure: Yes        Correct Site: Yes        Correct Position: Yes        Correct Laterality: Yes        Site Marked: Yes  Procedure Documentation  Procedure: TAP       Diagnosis: POST OPERATIVE PAIN       Laterality: bilateral       Patient Position: supine       Patient Prep/Sterile Barriers: sterile gloves, mask       Skin prep: Chloraprep       Needle Type: short bevel       Needle Gauge: 21.        Needle Length (millimeters): 110        Ultrasound guided       1. Ultrasound was used to identify targeted nerve, plexus, vascular marker, or fascial plane and place a needle adjacent to it in real-time.       2. Ultrasound was used to visualize the spread of anesthetic in close proximity to the above referenced structure.       3. A permanent image is entered into the patient's record.    Assessment/Narrative         The placement was negative for: blood aspirated, painful injection and site bleeding       Paresthesias: No.       Bolus given via needle..        Secured via.        Insertion/Infusion Method: Single Shot       Complications: none       Injection made incrementally with aspirations every 5 mL.    Medication(s) Administered   Bupivacaine 0.25% PF (Infiltration) - Infiltration   20 mL - 1/8/2024 1:57:00 PM  Bupivacaine liposome (Exparel) 1.3% LA inj susp (Infiltration) - Infiltration   20 mL - 1/8/2024 1:57:00 PM    FOR [facility] ([facility]) ONLY:   Pain Team Contact information: please page the Pain Team Via [employer]. Search \"Pain\". During daytime hours, please page " the attending first. At night please page the resident first.

## 2024-01-08 NOTE — PLAN OF CARE
Problem: Postpartum ( Delivery)  Goal: Hemostasis  Outcome: Progressing     Problem: Postpartum ( Delivery)  Goal: Optimal Pain Control and Function  Outcome: Progressing   Goal Outcome Evaluation:Client has unremarkable involution process and denies pain at this time.

## 2024-01-08 NOTE — OP NOTE
PROCEDURE NOTE:      NAME:  Marisol Licea   RECORD # [id number]   ADMIT DATE: 2024    DATE OF SERVICE: 2024     PREOPERATIVE DIAGNOSIS:  Prior c.s   38wks.  Labor.  Multiparity, desires sterilization.      PROCEDURE: Low transverse  section via pfanenstial with bilateral tubal ligation    SURGEON:  Essence Perkins DO, FACOG     ASSISTANT: Dee Davidson MD  essential for exposure, retraction and delivery of the baby girl.    ANESTHESIA: spinal    Delivery QBL (mL): 391     DRAINS: Moore catheter.    COMPLICATIONS: none apparent    FINDINGS: Normal uterus, tubes and ovaries bilateral. Normal appearance to the adnexae.  Live female infant born, Apgars of   9 at 1 minute,   9 at 5 minutes,   weight is 9lb 1 oz.     CONSENT: Patient was met preoperatively where we discussed the procedure and the risks associated with the procedure.  She understood these to include but not limited to injury to adjacent organs including bowel, bladder, ureter, infection and bleeding. Understanding these risks her consents were signed.      PROCEDURE: Patient was brought to the operating room in stable condition.  After induction of a spinal anesthetic, fetal heart tones were checked and were stable. She was prepped and draped in sterile fashion for the procedure.  A timeout was then performed.      A pfannensteil skin incision was made to the level of the fascia.  The fascia was incised laterally. Kocher clamps were applied to the superior aspect of the incision which was sharply dissected from the rectus muscles.  The kocher clamps were then reapplied to the inferior aspect of the incision which was similarly sharply dissected from the rectus muscles.  The rectus muscles were  bluntly in the midline.  The peritoneum was entered sharply. This incision was then extended.  A bladder blade was introduced. The vesicouterine peritoneum was identified and a bladder flap was was then created.  A  low transverse uterine incision was made and amniotic sac was ruptured revealing clear amniotic fluid.  The baby's head was then delivered.There was not a nuchal cord noted. There was a spontaneous cry and therefore bulb suction was performed. The remainder of the infant was easily delivered. The cord was clamped x 2 and cut and the infant handed off to waiting nursing personnel.    The placenta was then manually removed from the uterus.  The uterus was exteriorized, covered with a moist laparotomy sponge and cleared of all clots and debris.  The uterine incision was closed with 0 vicryl from both angles in a running locking suture and  A second imbricating suture of 0 was used for hemostasis.     The tubes were followed out to the fimbriated ends.   Ligasure was used to cauterize and cut the tubes along the mesosalpinx and at the cornual region of their attachments.   All pedicles appeared hemostatic.         The uterus was returned to the abdominopelvic cavity.  The pericolic gutters were cleared of all clots and debris.  The uterine incision was again inspected and noted to be hemostatic.  The rectus muscles were made hemostatic with the use of electrocautery. The fascia was brought together with vicryl from both angles in a running nonlocking suture, met at the midline, the subcutaneous tissues were irrigated, made hemostatic with use of electrocautery and brought together with 3-0 plain.  Skin was closed with INSORB staples.  Patient tolerated this procedure well.  Sponge, lap and needle counts were correct x two.      Essence Perkins DO, FACOG  1/8/2024 1:46 PM         CC: Dee Davidson Megan Lynn McEllistrem, MD

## 2024-01-08 NOTE — TELEPHONE ENCOUNTER
"OB Triage Call      Is patient's OB/Midwife with the [facility] or [facility]? [facility]- Proceed with triage     Reason for call: Patient is calling with a lot of pelvic, states she is very sore in the pelvis. Hurts to walk or stand. Patient is able to walk, has felt dizzy at times. Pelvic pain is worsening.     Assessment:   Pelvic pain is constant, moderate to severe  Patient is also feeling moderate to severe pain in lower abdomen  Standing increases pain, or if pressure is on one side or the other, the pain increase  Patient feels like she needs to hold something in    Plan: Plan to go to L&D now.    Patient plans to deliver at [facility]    Patient's primary OB Provider is Ti Alejandre .      Per protocol recommendations Patient to be evaluated in L&D. Patient's primary OB is [facility].  Labor and delivery at [facility] ([phone]) notified of patient's pending arrival.  Report given to Manuela.    Patient is scheduled to have  on, 23.     Is patient's delivering hospital on divert? No      38w3d    Estimated Date of Delivery: 2024        OB History    Para Term  AB Living   2 1 0 1 0 1   SAB IAB Ectopic Multiple Live Births   0 0 0 0 1      # Outcome Date GA Lbr Joselito/2nd Weight Sex Delivery Anes PTL Lv   2 Current            1  22 36w5d  3.75 kg (8 lb 4.3 oz) M CS-LTranv EPI  PEDRO      Complications: Failure to Progress in First Stage      Name: LAVERNE MONTERO-ILEANA      Apgar1: 9  Apgar5: 9       No results found for: \"GBS\"       Cherry Yousif RN 24 9:57 AM  [facility]  Reason for Disposition   MODERATE-SEVERE abdominal pain    Additional Information   Negative: Passed out (i.e., fainted, collapsed and was not responding)   Negative: Shock suspected (e.g., cold/pale/clammy skin, too weak to stand, low BP, rapid pulse)   Negative: Difficult to awaken or acting confused (e.g., disoriented, slurred speech)   " "Negative: SEVERE constant abdominal pain (e.g., excruciating) and present > 1 hour   Negative: SEVERE bleeding (e.g., continuous red blood from vagina, or large blood clots)   Negative: Umbilical cord hanging out of the vagina (shiny, white, curled appearance, \"like telephone cord\")   Negative: Uncontrollable urge to push (i.e., feels like baby is coming out now)   Negative: Can see baby   Negative: Sounds like a life-threatening emergency to the triager   Negative: Pregnant < 37 weeks (i.e., )   Negative: Uncertain delivery date and possibly pregnant < 37 weeks (i.e., )    Protocols used: Pregnancy - Labor-A-[place]    "

## 2024-01-08 NOTE — PROGRESS NOTES
Patient reports that overall she has been feeling well.  Dr. Davidson is out of clinic today and patient could not be seen another day and so is seeing me for her routine visit today.  She has a repeat  section scheduled at 39 weeks gestation.  Her blood type is O NEG and we could not find evidence of a Rh immunoglobulin administration in the 3rd trimester, nor could patient recall having this done.  For this reason RhoGAM was administered today.  Patient denies leakage of fluid or bleeding, denies RUQ pain, headaches or blurred vision.  She denies contractions.  She has no lower extremity edema.  She denies itching, but does have concern for cholestasis as she had this with her last pregnancy and is aware of the risk of recurrence.  LFTs and bile acids drawn today and returned normal.  Concerns: would like LFTs and bile acids checked  No vaginal bleeding, LOF, contractions.  No HA, RUQ pain, N/V, visual changes.  Discussed signs of labor and when to call or come in.  Discussed kick counts and fetal movement.  Reportable signs and symptoms discussed.  Labor precautions discussed.  RTC 1 week.    Kala Martel MD

## 2024-01-09 LAB
HGB BLD-MCNC: 11 G/DL (ref 11.7–15.7)
PATH REPORT.COMMENTS IMP SPEC: NORMAL
PATH REPORT.FINAL DX SPEC: NORMAL
PATH REPORT.FINAL DX SPEC: NORMAL
PATH REPORT.GROSS SPEC: NORMAL
PATH REPORT.GROSS SPEC: NORMAL
PATH REPORT.MICROSCOPIC SPEC OTHER STN: NORMAL
PATH REPORT.MICROSCOPIC SPEC OTHER STN: NORMAL
PATH REPORT.RELEVANT HX SPEC: NORMAL
PATH REPORT.RELEVANT HX SPEC: NORMAL
PHOTO IMAGE: NORMAL
PHOTO IMAGE: NORMAL

## 2024-01-09 PROCEDURE — 120N000001 HC R&B MED SURG/OB

## 2024-01-09 PROCEDURE — 250N000011 HC RX IP 250 OP 636: Performed by: OBSTETRICS & GYNECOLOGY

## 2024-01-09 PROCEDURE — 36415 COLL VENOUS BLD VENIPUNCTURE: CPT | Performed by: OBSTETRICS & GYNECOLOGY

## 2024-01-09 PROCEDURE — 250N000013 HC RX MED GY IP 250 OP 250 PS 637: Performed by: FAMILY MEDICINE

## 2024-01-09 PROCEDURE — 85018 HEMOGLOBIN: CPT | Performed by: OBSTETRICS & GYNECOLOGY

## 2024-01-09 PROCEDURE — 250N000013 HC RX MED GY IP 250 OP 250 PS 637: Performed by: OBSTETRICS & GYNECOLOGY

## 2024-01-09 RX ORDER — NALBUPHINE HYDROCHLORIDE 20 MG/ML
2.5-5 INJECTION, SOLUTION INTRAMUSCULAR; INTRAVENOUS; SUBCUTANEOUS EVERY 6 HOURS PRN
Status: DISCONTINUED | OUTPATIENT
Start: 2024-01-09 | End: 2024-01-11 | Stop reason: HOSPADM

## 2024-01-09 RX ADMIN — ACETAMINOPHEN 975 MG: 325 TABLET ORAL at 00:33

## 2024-01-09 RX ADMIN — IBUPROFEN 800 MG: 800 TABLET ORAL at 18:14

## 2024-01-09 RX ADMIN — NALBUPHINE HYDROCHLORIDE 2.5 MG: 20 INJECTION, SOLUTION INTRAMUSCULAR; INTRAVENOUS; SUBCUTANEOUS at 08:32

## 2024-01-09 RX ADMIN — KETOROLAC TROMETHAMINE 30 MG: 30 INJECTION, SOLUTION INTRAMUSCULAR; INTRAVENOUS at 11:41

## 2024-01-09 RX ADMIN — NALBUPHINE HYDROCHLORIDE 2.5 MG: 20 INJECTION, SOLUTION INTRAMUSCULAR; INTRAVENOUS; SUBCUTANEOUS at 14:45

## 2024-01-09 RX ADMIN — OXYCODONE HYDROCHLORIDE 5 MG: 5 TABLET ORAL at 01:36

## 2024-01-09 RX ADMIN — OXYCODONE HYDROCHLORIDE 5 MG: 5 TABLET ORAL at 06:37

## 2024-01-09 RX ADMIN — NALBUPHINE HYDROCHLORIDE 2.5 MG: 20 INJECTION, SOLUTION INTRAMUSCULAR; INTRAVENOUS; SUBCUTANEOUS at 21:21

## 2024-01-09 RX ADMIN — KETOROLAC TROMETHAMINE 30 MG: 30 INJECTION, SOLUTION INTRAMUSCULAR; INTRAVENOUS at 03:39

## 2024-01-09 RX ADMIN — OXYCODONE HYDROCHLORIDE 5 MG: 5 TABLET ORAL at 21:01

## 2024-01-09 RX ADMIN — SENNOSIDES AND DOCUSATE SODIUM 1 TABLET: 50; 8.6 TABLET ORAL at 08:02

## 2024-01-09 RX ADMIN — OXYCODONE HYDROCHLORIDE 5 MG: 5 TABLET ORAL at 11:41

## 2024-01-09 RX ADMIN — SERTRALINE HYDROCHLORIDE 25 MG: 25 TABLET ORAL at 08:02

## 2024-01-09 RX ADMIN — ACETAMINOPHEN 975 MG: 325 TABLET ORAL at 06:17

## 2024-01-09 RX ADMIN — SENNOSIDES AND DOCUSATE SODIUM 2 TABLET: 8.6; 5 TABLET ORAL at 21:02

## 2024-01-09 RX ADMIN — ACETAMINOPHEN 975 MG: 325 TABLET ORAL at 11:41

## 2024-01-09 RX ADMIN — ACETAMINOPHEN 975 MG: 325 TABLET ORAL at 18:14

## 2024-01-09 ASSESSMENT — ACTIVITIES OF DAILY LIVING (ADL)
ADLS_ACUITY_SCORE: 18
ADLS_ACUITY_SCORE: 22
ADLS_ACUITY_SCORE: 18
ADLS_ACUITY_SCORE: 22

## 2024-01-09 NOTE — PLAN OF CARE
"  Problem: Postpartum ( Delivery)  Goal: Successful Parent Role Transition  Outcome: Progressing  Intervention: Support Parent Role Transition  Recent Flowsheet Documentation  Taken 2024 190 by Kimberli Huddleston RN  Supportive Measures:   active listening utilized   self-care encouraged   relaxation techniques promoted  Parent-Child Attachment Promotion: strengths emphasized  Goal: Optimal Pain Control and Function  Outcome: Progressing  Intervention: Prevent or Manage Pain  Recent Flowsheet Documentation  Taken 2024 by Kimberli Huddleston RN  Pain Management Interventions:   medication (see MAR)   emotional support   rest  Goal: Effective Urinary Elimination  Outcome: Progressing   Goal Outcome Evaluation:       VSS, FFU with scant lochia. Pt reports tingling and numbness in left leg at this time, so will wait to ambulate. Dangled at edge of bed and tolerated moderately, was able to bear weight and stand. Very anxious, reports \"very low pain tolerance.\" This writer discussed pain regimen of scheduled Tylenol, Ibuprofen, and PRN oxycodone for pain. Pt also reports itching that is \"driving her crazy.\" MD called and order obtained for PRN Nubain, given to pt and pt became drowsy, oxygen saturations 97-99% and arouses easily with good eye contact. This writer encouraged pt to take a nap, if possible, and then we plan to ambulate to bathroom.       This writer called into room at  to assist with breastfeeding. Good, deep latch obtained on both breasts with under arm hold. Lots of swallowing heard.      PRN oxycodone and scheduled Toradol given 30 minutes before ambulation and umana removal.       "

## 2024-01-09 NOTE — PROGRESS NOTES
Notified Dr. Jasmine of Nubain orders for itching was not usual dose. Discussed normal orders for Nubain for itching and MD requested RN correct the order. Order corrected.Angi Patel RN

## 2024-01-09 NOTE — PLAN OF CARE
Problem: Postpartum ( Delivery)  Goal: Effective Urinary Elimination  Outcome: Not Progressing     Pt unable void, bladder scan showed 489 and patient was straight cath for 600cc at 0315. Encouraged pt to drink plenty of fluids and also educated her on our bladder management protocol. Pt is due to void by 0730.    Problem: Postpartum ( Delivery)  Goal: Hemostasis  Outcome: Progressing  Goal: Absence of Infection Signs and Symptoms  Outcome: Progressing  Goal: Optimal Pain Control and Function  Outcome: Progressing  Intervention: Prevent or Manage Pain  Recent Flowsheet Documentation  Taken 2024 0339 by Breonna Sterling, RN  Pain Management Interventions: medication (see MAR)  Taken 2024 2315 by Breonna Sterling, RN  Pain Management Interventions: medication (see MAR)     VSS, continues to c/o pain in right abdomen, oxycodone give twice this shift. Pt states the pain meds do help to relieve the pain. Fundus is firm and bleeding is light.  Incision dressing has scant old drainage. Pt is bonding well with infant.

## 2024-01-09 NOTE — PROGRESS NOTES
"Csection - Post operative day 1    ASSESSMENT: PLAN:   POD#1    Repeat with bilateral tubal ligation (BTL)  Doing well  Continue routine cares   aniticipate discharge in am    SUBJECTIVE:    The patient feels well: Catheter is out, bleeding decreased, tolerating normal diet, and passing flatus.  Pain is well controlled. The patient has no emotional concerns.  The baby is well and being fed    OBJECTIVE:  /79 (BP Location: Left arm, Patient Position: Semi-Solis's, Cuff Size: Adult Regular)   Pulse 74   Temp 98.1  F (36.7  C) (Oral)   Resp 18   Ht 1.651 m (5' 5\")   Wt 100.2 kg (221 lb)   LMP  (LMP Unknown)   SpO2 98%   Breastfeeding Unknown   BMI 36.78 kg/m      Fundus firm  Incision- dressing dry   Ext- nontender      Lab  Hemoglobin   Date Value Ref Range Status   01/08/2024 12.6 11.7 - 15.7 g/dL Final   ]        Leonor Jasmine MD  [facility]  [phone]    "

## 2024-01-09 NOTE — ANESTHESIA POSTPROCEDURE EVALUATION
Patient: Marisol Licea    Procedure: Procedure(s):  REPEAT  SECTION AND BILATERAL TUBAL LIGATION       Anesthesia Type:  Spinal    Note:  Disposition: Inpatient   Postop Pain Control: Uneventful            Sign Out: Well controlled pain   PONV: No   Neuro/Psych: Uneventful            Sign Out: Acceptable/Baseline neuro status   Airway/Respiratory: Uneventful            Sign Out: Acceptable/Baseline resp. status   CV/Hemodynamics: Uneventful            Sign Out: Acceptable CV status; No obvious hypovolemia; No obvious fluid overload   Other NRE: NONE   DID A NON-ROUTINE EVENT OCCUR? No           Last vitals:  Vitals:    24 0315 24 0748 24 1143   BP: 122/79 114/67 120/70   Pulse:  70 75   Resp: 18 16 18   Temp: 36.7  C (98.1  F) 36.4  C (97.5  F) 36.4  C (97.5  F)   SpO2:  99% 99%       Electronically Signed By: Piotr Tierney MD  2024  2:17 PM

## 2024-01-09 NOTE — PLAN OF CARE
"  Problem: Adult Inpatient Plan of Care  Goal: Plan of Care Review  Description: The Plan of Care Review/Shift note should be completed every shift.  The Outcome Evaluation is a brief statement about your assessment that the patient is improving, declining, or no change.  This information will be displayed automatically on your shift  note.  Outcome: Progressing  Flowsheets (Taken 1/9/2024 1538)  Plan of Care Reviewed With: patient  Overall Patient Progress: improving  Goal: Patient-Specific Goal (Individualized)  Description: You can add care plan individualizations to a care plan. Examples of Individualization might be:  \"Parent requests to be called daily at 9am for status\", \"I have a hard time hearing out of my right ear\", or \"Do not touch me to wake me up as it startles  me\".  Outcome: Progressing  Goal: Absence of Hospital-Acquired Illness or Injury  Outcome: Progressing  Intervention: Prevent Skin Injury  Recent Flowsheet Documentation  Taken 1/9/2024 0800 by Moriah Beaulieu RN  Body Position: position changed independently  Intervention: Prevent and Manage VTE (Venous Thromboembolism) Risk  Recent Flowsheet Documentation  Taken 1/9/2024 0800 by Moriah Beaulieu RN  VTE Prevention/Management: SCDs (sequential compression devices) on  Goal: Optimal Comfort and Wellbeing  Outcome: Progressing  Intervention: Monitor Pain and Promote Comfort  Recent Flowsheet Documentation  Taken 1/9/2024 1141 by Moriah Beaulieu RN  Pain Management Interventions: rest  Taken 1/9/2024 0847 by Moriah Beaulieu RN  Pain Management Interventions: rest  Taken 1/9/2024 0722 by Moriah Beaulieu RN  Pain Management Interventions:   medication (see MAR)   emotional support  Taken 1/9/2024 0702 by Moriah Beaulieu RN  Pain Management Interventions:   medication (see MAR)   emotional support  Intervention: Provide Person-Centered Care  Recent Flowsheet Documentation  Taken 1/9/2024 0800 by Moriah Beaulieu RN  Trust " Relationship/Rapport:   care explained   choices provided   emotional support provided   reassurance provided   thoughts/feelings acknowledged  Goal: Readiness for Transition of Care  Outcome: Progressing   Goal Outcome Evaluation:      Plan of Care Reviewed With: patient    Overall Patient Progress: improvingOverall Patient Progress: improving       Patient had a lot of itching this morning so she requested and received a couple doses of 2.5mg Nubain throughout the shift.  She was rating her pain from the right side of her incision as 6/10 when she got up.  She has been taking scheduled Tylenol and Toradol as well as prn Oxycodone.  She states the medications are helping.      She had urinary retention and had to be straight cathed this morning for 1000ml.  She has since voided once for 200 ml at noon.  Patient was instructed to not empty the hat or take it out of the toilet because we need to measure again.  She was very anxious this morning but she seems to be in better spirits now that she is moving about in her room more and breastfeeding has been going well.  Her fundus is firm without massage and lochia is light.  Vital signs WDL.

## 2024-01-09 NOTE — PROGRESS NOTES
Patient was assisted to the bathroom right away at about 8am this morning and she was only able to void about 10 ml. She sat with water running and fingers in warm water to try to stimulate the start of urination but that was not successful.  She was bladder scanned for 892 ml and she was then straight cathed for 1000ml.  Her fundus is firm and lochia is light.  Will continue to monitor and bladder scan.

## 2024-01-10 PROCEDURE — 250N000013 HC RX MED GY IP 250 OP 250 PS 637: Performed by: OBSTETRICS & GYNECOLOGY

## 2024-01-10 PROCEDURE — 120N000001 HC R&B MED SURG/OB

## 2024-01-10 PROCEDURE — 250N000013 HC RX MED GY IP 250 OP 250 PS 637: Performed by: FAMILY MEDICINE

## 2024-01-10 RX ORDER — AMOXICILLIN 250 MG
1 CAPSULE ORAL 2 TIMES DAILY PRN
COMMUNITY
Start: 2024-01-10

## 2024-01-10 RX ORDER — HYDROXYZINE HYDROCHLORIDE 25 MG/1
25-50 TABLET, FILM COATED ORAL EVERY 6 HOURS PRN
Status: DISCONTINUED | OUTPATIENT
Start: 2024-01-10 | End: 2024-01-11 | Stop reason: HOSPADM

## 2024-01-10 RX ORDER — ACETAMINOPHEN 325 MG/1
975 TABLET ORAL EVERY 6 HOURS
COMMUNITY
Start: 2024-01-10

## 2024-01-10 RX ORDER — IBUPROFEN 800 MG/1
800 TABLET, FILM COATED ORAL EVERY 6 HOURS
COMMUNITY
Start: 2024-01-10

## 2024-01-10 RX ORDER — OXYCODONE HYDROCHLORIDE 5 MG/1
5 TABLET ORAL EVERY 4 HOURS PRN
Qty: 12 TABLET | Refills: 0 | Status: SHIPPED | OUTPATIENT
Start: 2024-01-10 | End: 2024-02-23

## 2024-01-10 RX ADMIN — IBUPROFEN 800 MG: 800 TABLET ORAL at 06:27

## 2024-01-10 RX ADMIN — SENNOSIDES AND DOCUSATE SODIUM 2 TABLET: 8.6; 5 TABLET ORAL at 08:15

## 2024-01-10 RX ADMIN — IBUPROFEN 800 MG: 800 TABLET ORAL at 18:15

## 2024-01-10 RX ADMIN — HYDROXYZINE HYDROCHLORIDE 25 MG: 25 TABLET, FILM COATED ORAL at 10:18

## 2024-01-10 RX ADMIN — OXYCODONE HYDROCHLORIDE 5 MG: 5 TABLET ORAL at 06:27

## 2024-01-10 RX ADMIN — OXYCODONE HYDROCHLORIDE 5 MG: 5 TABLET ORAL at 10:18

## 2024-01-10 RX ADMIN — SERTRALINE HYDROCHLORIDE 25 MG: 25 TABLET ORAL at 08:15

## 2024-01-10 RX ADMIN — ACETAMINOPHEN 975 MG: 325 TABLET ORAL at 00:33

## 2024-01-10 RX ADMIN — ACETAMINOPHEN 975 MG: 325 TABLET ORAL at 18:15

## 2024-01-10 RX ADMIN — ACETAMINOPHEN 975 MG: 325 TABLET ORAL at 06:27

## 2024-01-10 RX ADMIN — SENNOSIDES AND DOCUSATE SODIUM 2 TABLET: 8.6; 5 TABLET ORAL at 21:27

## 2024-01-10 RX ADMIN — IBUPROFEN 800 MG: 800 TABLET ORAL at 00:33

## 2024-01-10 RX ADMIN — HYDROXYZINE HYDROCHLORIDE 25 MG: 25 TABLET, FILM COATED ORAL at 23:17

## 2024-01-10 RX ADMIN — OXYCODONE HYDROCHLORIDE 5 MG: 5 TABLET ORAL at 00:33

## 2024-01-10 RX ADMIN — IBUPROFEN 800 MG: 800 TABLET ORAL at 11:54

## 2024-01-10 RX ADMIN — OXYCODONE HYDROCHLORIDE 5 MG: 5 TABLET ORAL at 23:17

## 2024-01-10 RX ADMIN — ACETAMINOPHEN 975 MG: 325 TABLET ORAL at 11:55

## 2024-01-10 ASSESSMENT — ACTIVITIES OF DAILY LIVING (ADL)
ADLS_ACUITY_SCORE: 18

## 2024-01-10 NOTE — PLAN OF CARE
"Goal Outcome Evaluation: Progressing      Plan of Care Reviewed With: patient    Overall Patient Progress: improvingOverall Patient Progress: improving    Patient's VSS. Voiding spontaneously and adequately. Fundus is firm 1 cm below umbilicus with light lochia. Up ad rizwan and performing self-cares independently. Showered this shift, dressing removed per orders, steri strips in place with old/dried blood/drainage. Reporting incisional pain being treated with acetaminophen, ibuprofen, oxycodone, and rest. Also reporting pruritus being treated with nubain. Patient's significant other is in the room with her and infant, supportive and participating in cares.       /75 (BP Location: Right arm, Patient Position: Semi-Solis's, Cuff Size: Adult Regular)   Pulse 78   Temp 97.8  F (36.6  C) (Oral)   Resp 18   Ht 1.651 m (5' 5\")   Wt 100.2 kg (221 lb)   LMP  (LMP Unknown)   SpO2 98%   Breastfeeding Unknown   BMI 36.78 kg/m      MATT SYKES RN on 2024 at 10:49 PM      Problem: Adult Inpatient Plan of Care  Goal: Optimal Comfort and Wellbeing  Outcome: Progressing     Problem: Postpartum ( Delivery)  Goal: Effective Urinary Elimination  Outcome: Progressing       "

## 2024-01-10 NOTE — PLAN OF CARE
Problem: Adult Inpatient Plan of Care  Goal: Optimal Comfort and Wellbeing  Outcome: Progressing  Intervention: Monitor Pain and Promote Comfort  Recent Flowsheet Documentation  Taken 1/10/2024 0033 by Ale Potter RN  Pain Management Interventions: medication (see MAR)  Intervention: Provide Person-Centered Care  Recent Flowsheet Documentation  Taken 1/10/2024 0000 by Ale Potter RN  Trust Relationship/Rapport:   care explained   choices provided   questions encouraged   questions answered   thoughts/feelings acknowledged   reassurance provided   empathic listening provided   emotional support provided     Problem: Postpartum ( Delivery)  Goal: Optimal Pain Control and Function  Outcome: Progressing  Intervention: Prevent or Manage Pain  Recent Flowsheet Documentation  Taken 1/10/2024 0033 by Ale Potter RN  Pain Management Interventions: medication (see MAR)  Taken 1/10/2024 0000 by Ale Potter RN  Perineal Care:   perineal hygiene encouraged   perineal spray bottle/warm water use encouraged   Goal Outcome Evaluation:      Plan of Care Reviewed With: patient    Overall Patient Progress: improvingOverall Patient Progress: improving       Marisol's Vitals and Post-C/Section assessment are within normal limit. Lung sounds clear,Positive bowel sounds Tolerating regular diet , Positive Flatus. Lower abdominal incision with steri-strips has old dry drainage, voiding without difficulty.   Mom breastfeeding and requiring assistance. Writer helped mom with Breastfeeding using football hold.  She bonding well with Baby. Significant other present in the room.     Care Plan - Latch Difficulty      Place baby skin to skin on mom's chest up to an hour before feeding.   Attempt breastfeeding on infant's early hunger cues (hand in mouth, rooting).  Position baby in cross cradle or football hold, which may help achieve latch.   If latched, watch for rhythmic sucking and occasional  swallows.  Limit latch attempts to 5-10 minutes.  If latch difficulty or few/no swallows noted:  Hand express colostrum and offer via spoon before or after feeding attempt to increase baby's energy level.  Pump breasts for 15 minutes to stimulate milk production.   Feed expressed milk to baby using the amounts below as a guideline, give more as baby cues.  If necessary, make up the difference with donor milk or formula as a bridge until milk supply increases:    0-24 hours     2-10 ml each feeding (may use spoon)  24-48 hours   5-15 ml each feeding  48-72 hours   15-30 ml each feeding  72-96 hours   30-60 ml each feeding    Ale Potter RN

## 2024-01-10 NOTE — LACTATION NOTE
Reason for Initial Lactation Visit: Lactation consultant to patient room to assess breastfeeding due to latch difficulty. Declined lactation services 1/9/23.    Breastfeeding goals: breastfeeding    Past breastfeeding experience: primarily pumped and bottle fed first child, now 12 months.    Maternal health risks that may affect breastfeeding: breast implants    Pump for home use: no, needs pump    Breastfeeding since birth: attempting, and then supplementing with colostrum and donor milk.     Education: Infant crying lustily when I entered the room. Bedside RN and Mother report this is how baby behaves at every breastfeeding attempt. Instructed on benefit of skin to skin prior to feeding to waken and ready for feeding, importance of feeding baby on early hunger cues. Also suggested feeding infant colostrum prior to feeding if very fussy to calm baby before attempting breastfeeding. Assisted with feeding bilaterally. Infant latched to R breast, but very fussy. Gave small amounts of colostrum with syringe at the breast and baby rhythmically sucking. Calmed baby through walking and rocking prior to attempting 2nd breast. Infant able to latch on L breast in cross cradle hold when calm, and rhythmic sucking noted. Mom needs continuous coaching to hold baby tummy to tummy and bring infant onto the nipple rather than stuffing nipple into baby's mouth. Suggested feeding small amount of colostrum or donor milk prior to latch if infant is really fussy, breastfeed, and then supplement after prn. Instructed to pump bilaterally after every feeding of a supplement for stimulation.    Reviewed importance of breastfeeding 8-12 times, both breasts, in 24 hours for adequate infant nutrition and hydration as well as for building an optimal milk supply. Education given on importance of optimal infant positioning for deep, comfortable latch and effective milk transfer.Reviewed techniques for keeping infant actively sucking, including  breast compressions.    Anticipatory guidance given on input/output goals, normal feeding volumes in the  period, and pumping. Instructed on breast lift technique to assist with encouragement, which may be increased due to implants. Reviewed online and outpatient lactation resources for breastfeeding help after discharge.     Answered questions and gave encouragement.

## 2024-01-10 NOTE — PROGRESS NOTES
"POSTPARTUM DAY #2/     Subjective:  The patient feels tired, more pain on right, still not quite in control of pain management.  Has been up to void, trying to pump/nurse.  Baby in room.  Needing assistance still with getting out of bed and with baby.    Objective   The patient has a blood pressure which is within the normal range. Urinary output is adequate.     Exam:   /65 (BP Location: Right arm, Patient Position: Semi-Solis's, Cuff Size: Adult Regular)   Pulse 75   Temp 98.3  F (36.8  C) (Oral)   Resp 19   Ht 1.651 m (5' 5\")   Wt 100.2 kg (221 lb)   LMP  (LMP Unknown)   SpO2 97%   Breastfeeding Unknown   BMI 36.78 kg/m    General: NAD, alert and orientated   Abdomen: soft, NT   Fundus: firm, nontender  Incision: steris placed  Ext: no pain 1+ edema    LAB:  Lab Results   Component Value Date    HGB 11.0 (L) 2024         I/Os    Intake/Output Summary (Last 24 hours) at 1/10/2024 0656  Last data filed at 1/10/2024 0030  Gross per 24 hour   Intake 200 ml   Output 1435 ml   Net -1235 ml         Impression:   Normal postpartum course, POD#2 repeat c/s and tubal for history of c/s,desired repeat, in labor    Plan: - encourage ambulation   - Continue current care  -add vistaril prn  -possible discharge later today but likely tomorrow    Krista Richard MD FACOG  [facility]  [phone]    "

## 2024-01-10 NOTE — PLAN OF CARE
Problem: Postpartum ( Delivery)  Goal: Successful Parent Role Transition  Outcome: Progressing  Intervention: Support Parent Role Transition  Recent Flowsheet Documentation  Taken 1/10/2024 0900 by Stacey Kim RN  Supportive Measures:   active listening utilized   self-care encouraged  Parent-Child Attachment Promotion:   interaction encouraged   parent/caregiver presence encouraged   participation in care promoted   Goal Outcome Evaluation:       Pt bonding with , attentive to all needs. Breastfeeding, pumping & supplementing with donor milk. Pt is eager to learn.      Problem: Postpartum ( Delivery)  Goal: Optimal Pain Control and Function  Outcome: Progressing  Intervention: Prevent or Manage Pain  Recent Flowsheet Documentation  Taken 1/10/2024 0900 by Stacey Kim RN  Pain Management Interventions: declines  Perineal Care:   perineal hygiene encouraged   perineal spray bottle/warm water use encouraged    Pain controlled with Tylenol/Ibuprofen/Oxycodone/Vistaril prn. Pt states she is extremely exhausted. Enc walking in hallways, resting as able. Dressing old bloody, changed dressing, applied ice.    Pt expressed lack of support from S.O. Listened, reassured, came up with a plan. Enc FOB support with next feeding before he leaves to go home.      Birth Certificate/ROP yet to be filed.    Stacey Kim, CLAIR

## 2024-01-11 ENCOUNTER — MYC MEDICAL ADVICE (OUTPATIENT)
Dept: FAMILY MEDICINE | Facility: CLINIC | Age: 34
End: 2024-01-11

## 2024-01-11 ENCOUNTER — E-VISIT (OUTPATIENT)
Dept: URGENT CARE | Facility: CLINIC | Age: 34
End: 2024-01-11
Payer: COMMERCIAL

## 2024-01-11 VITALS
DIASTOLIC BLOOD PRESSURE: 70 MMHG | HEIGHT: 65 IN | TEMPERATURE: 98.3 F | HEART RATE: 79 BPM | WEIGHT: 221 LBS | BODY MASS INDEX: 36.82 KG/M2 | RESPIRATION RATE: 16 BRPM | OXYGEN SATURATION: 97 % | SYSTOLIC BLOOD PRESSURE: 96 MMHG

## 2024-01-11 DIAGNOSIS — K13.0 LESION OF LIP: Primary | ICD-10-CM

## 2024-01-11 DIAGNOSIS — B00.1 COLD SORE: Primary | ICD-10-CM

## 2024-01-11 PROCEDURE — 99207 PR NON-BILLABLE SERV PER CHARTING: CPT | Performed by: NURSE PRACTITIONER

## 2024-01-11 PROCEDURE — 250N000013 HC RX MED GY IP 250 OP 250 PS 637: Performed by: OBSTETRICS & GYNECOLOGY

## 2024-01-11 PROCEDURE — 250N000013 HC RX MED GY IP 250 OP 250 PS 637: Performed by: FAMILY MEDICINE

## 2024-01-11 RX ADMIN — SENNOSIDES AND DOCUSATE SODIUM 2 TABLET: 8.6; 5 TABLET ORAL at 07:42

## 2024-01-11 RX ADMIN — IBUPROFEN 800 MG: 800 TABLET ORAL at 12:21

## 2024-01-11 RX ADMIN — ACETAMINOPHEN 975 MG: 325 TABLET ORAL at 06:44

## 2024-01-11 RX ADMIN — OXYCODONE HYDROCHLORIDE 5 MG: 5 TABLET ORAL at 12:21

## 2024-01-11 RX ADMIN — ACETAMINOPHEN 975 MG: 325 TABLET ORAL at 00:56

## 2024-01-11 RX ADMIN — IBUPROFEN 800 MG: 800 TABLET ORAL at 06:44

## 2024-01-11 RX ADMIN — IBUPROFEN 800 MG: 800 TABLET ORAL at 00:55

## 2024-01-11 RX ADMIN — ACETAMINOPHEN 975 MG: 325 TABLET ORAL at 12:21

## 2024-01-11 RX ADMIN — SERTRALINE HYDROCHLORIDE 25 MG: 25 TABLET ORAL at 07:42

## 2024-01-11 ASSESSMENT — ACTIVITIES OF DAILY LIVING (ADL)
ADLS_ACUITY_SCORE: 18

## 2024-01-11 NOTE — DISCHARGE INSTRUCTIONS
Postop  Birth Instructions    Activity     Do not lift more than 10 pounds for 6 weeks after surgery.  Ask family and friends for help when you need it.  No driving until you have stopped taking your pain medications (usually two weeks after surgery).  No heavy exercise or activity for 6 weeks.  Don't do anything that will put a strain on your surgery site.  Don't strain when using the toilet.  Your care team may prescribe a stool softener if you have problems with your bowel movements.     To care for your incision:     Keep the incision clean and dry.  Do not soak your incision in water. No swimming or hot tubs until it has fully healed. You may soak in the bathtub if the water level is below your incision.  Do not use peroxide, gel, cream, lotion, or ointment on your incision.  Adjust your clothes to avoid pressure on your surgery site (check the elastic in your underwear for example).     You may see a small amount of clear or pink drainage and this is normal.  Check with your health care provider:     If the drainage increases or has an odor.  If the incision reddens, you have swelling, or develop a rash.  If you have increased pain and the medicine we prescribed doesn't help.  If you have a fever above 100.4 F (38 C) with or without chills when placing thermometer under your tongue.   The area around your incision (surgery wound), will feel numb.  This is normal. The numbness should go away in less than a year.     Keep your hands clean:  Always wash your hands before touching your incision (surgery wound). This helps reduce your risk of infection. If your hands aren't dirty, you may use an alcohol hand-rub to clean your hands. Keep your nails clean and short.    Call your healthcare provider if you have any of these symptoms:     You soak a sanitary pad with blood within 1 hour, or you see blood clots larger than a golf ball.  Bleeding that lasts more than 6 weeks.  Vaginal discharge that smells  bad.  Severe pain, cramping or tenderness in your lower belly area.  A need to urinate more frequently (use the toilet more often), more urgently (use the toilet very quickly), or it burns when you urinate.  Nausea and vomiting.  Redness, swelling or pain around a vein in your leg.  Problems breastfeeding or a red or painful area on your breast.  Chest pain and cough or are gasping for air.  Problems with coping with sadness, anxiety or depression. If you have concerns about hurting yourself or the baby, call your provider immediately.    You have questions or concerns after you return home.                Warning Signs after Having a Baby    Keep this paper on your fridge or somewhere else where you can see it.    Call your provider if you have any of these symptoms up to 12 weeks after having your baby.    Thoughts of hurting yourself or your baby  Pain in your chest or trouble breathing  Severe headache not helped by pain medicine  Eyesight concerns (blurry vision, seeing spots or flashes of light, other changes to eyesight)  Fainting, shaking or other signs of a seizure    Call 9-1-1 if you feel that it is an emergency.     The symptoms below can happen to anyone after giving birth. They can be very serious. Call your provider if you have any of these warning signs.    My provider s phone number: _______________________    Losing too much blood (hemorrhage)    Call your provider if you soak through a pad in less than an hour or pass blood clots bigger than a golf ball. These may be signs that you are bleeding too much.    Blood clots in the legs or lungs    After you give birth, your body naturally clots its blood to help prevent blood loss. Sometimes this increased clotting can happen in other areas of the body, like the legs or lungs. This can block your blood flow and be very dangerous.     Call your provider if you:  Have a red, swollen spot on the back of your leg that is warm or painful when you touch it.    Are coughing up blood.     Infection    Call your provider if you have any of these symptoms:  Fever of 100.4 F (38 C) or higher.  Pain or redness around your stitches if you had an incision.   Any yellow, white, or green fluid coming from places where you had stitches or surgery.    Mood Problems (postpartum depression)    Many people feel sad or have mood changes after having a baby. But for some people, these mood swings are worse.     Call your provider right away if you feel so anxious or nervous that you can't care for yourself or your baby.    Preeclampsia (high blood pressure)    Even if you didn't have high blood pressure when you were pregnant, you are at risk for the high blood pressure disease called preeclampsia. This risk can last up to 12 weeks after giving birth.     Call your provider if you have:   Pain on your right side under your rib cage  Sudden swelling in the hands and face    Remember: You know your body. If something doesn't feel right, get medical help.     For informational purposes only. Not to replace the advice of your health care provider. Copyright 2020 [facility]. All rights reserved. Clinically reviewed by Luli Villareal, RNC-OB, MSN. Zwittle [id number] - Rev 02/23.

## 2024-01-11 NOTE — PLAN OF CARE
VSS. Up ad rizwan. Breastfeeding and tolerating well, pumping intermittently, also bottle feeding human donor milk d/t 9% weight loss. Light amount of lochia, no clots noted. Incision covered with steri strips and dry gauze, CDI. Tylenol, Ibuprofen, and oxycodone for pain control. BS audible/active x4, tolerating PO, denies N/V. Voiding. Bonding well with infant, attentive to cares. Significant other at bedside, supportive of patient, needs some encouragement for helping with infant cares.

## 2024-01-11 NOTE — CONSULTS
INITIAL SOCIAL WORK MATERNITY ASSESSMENT     DATA:      Reason for Social Work Consult: history of postpartum depression, minimal support from FOB, pt sober for 4.5 years     Presenting Information: SW reviewed chart and met with pt in her postpartum room. Pts  baby was asleep in the bassinet during SW visit.    Living Situation: Pt reports that she lives with her boyfriend, their one year old, and her boyfriend's 7 year 50% of the time.     Social Support/Professional Community Support:  Pt reports having a very strong support system which includes her mom and her boyfriend's mom. She reports that she has a strong group of friends from  that have become support people that she can call on when needed. She reports that her mom has done in-home  for the last 30 years and is someone that she can utilize for support for questions about her kids as well.     Insurance: [employer] through her employment     Source of Financial Support: Employment. Pt reports that she works full-time and gets maternity leave but it is unpaid. She reports she went back to work after six weeks when her first child was born a year ago. She reports that she makes too much money to qualify for [facility] or other county benefits.     Baby Supplies: Pt reports having all needed baby supplies including crib and car seat.     Mental Health History: Pt confirms that she has been dealing with anxiety and depression over the last year. She reports that she did not have a history of mental health concerns prior to the pregnancy and delivery of her first baby. She attributes much of her mental health struggles to situational stress in addition to hormonal changes in pregnancy and postpartum. She reports that she has had suicidal thoughts during this pregnancy which she informed her PCP about. She reports that she started zoloft at that time. She reports that some suicidal thoughts have continued but reports that she has never thought about a plan  "or had any suicidal intent. She reports that she knows she could never act on these thoughts and provides strong future focused thinking and protective factors, primarily being present for her kids. She reports that she does not have a therapist currently.      Chemical Health History: Pt reports that she has been sober since 2019. She notes that maintaining her sobriety is her highest priority. SW praised pts sobriety over multiple years and with multiple stressors.  No tox screen sent on the pt or her . Pt does report that her boyfriend has been dealing with active addiction during her pregnancy. She reports that he has been sober since mid-December and is in outpatient treatment.        INTERVENTION:      SW completed chart review and collaborated with the multidisciplinary team.   Psychosocial Assessment   Introduction to Maternal Child Health  role and scope of practice   Reviewed Hospital and Community Resources   Assessed Chemical Health History and Current Symptoms   Assessed Mental Health History and Current Symptoms   Identified stressors, barriers and family concerns   Provided support and active empathetic listening and validation.   Provided psychoeducation on  mood and anxiety disorders, assessed for any current symptoms or history    ASSESSMENT:    Pt was initially sleepy when SW came into the room but was open to meeting with SW. As assessment continued, pt became more awake and engaged. She was participative in the assessment and open with sharing information noted above. Pt expresses having a large and supportive \"army\" that she can call on to help her in the postpartum period. She reports that they have already been using support people as her boyfriend goes to treatment program Monday-Thursday evenings and she typically works until 7. She reports that her mom provides  for her older child and when her mom or her boyfriend's mom cannot assist she has additional " friends that she can utilize to help her. She reports plan to continue using that support in the postpartum period as she recovers since she is not able to lift her one year old. SW offered a public health nurse referral as an additional home support for the pt. Pt declines after discussing program with SW. She reports that her mom is highly knowledgable about child development and a very good listener.     As noted above, pt reports that the last two years have been filled with stressors related to a new relationship, two pregnancies, her boyfriend's struggles with addiction, and her own mental health concerns. Pt denies any concerns about interpersonal violence. SW validated the difficulty of what she is dealing with and her strength in maintaining her sobriety during this time. SW also praised pts disclosure of her mental health needs and suicidal thoughts to her PCP during pregnancy. SW encouraged her to continue being open about her needs. Pt reports that in managing her sobriety she has found that being open with others when she is struggling is necessary and helpful. She reports that she is also very open with her mom who she reports is a good listener. Pts suicidal thoughts were discussed as noted above. Pt denies having any suicidal plans or history of that and denies any suicidal intent. Pt reports that she is considering talking about increasing her zoloft with her provider. Discussed options for pt starting therapy as she had noted that she was interested in this. SW offered to set up an appointment. Pt declines. She reports that she did try therapy and did not have a good experience. She reports that she would be interested if she could get specific therapy focused on training her brain, such as CBT or another directed therapy method. MEGHAN informed her that this could be an option. MEGHAN offered to ask pts provider about placing an outpatient therapy referral so that someone would call her to assist in  scheduling. Pt also declines this at this time. SW encouraged her to continue considering therapy and discuss with her provider. Pt reports understanding.    SW provided additional education on postpartum mood disorders including when to seek help. SW encouraged continued close communication and openness about her symptoms with her mom and provider. Discussed that increased suicidal thoughts or other concerns should be considered an emergency and care sought right away. Pt reports understanding.     PLAN:    SW provided pt with parent resource list and noted the public health nurse program numbers and the mental health resources for pt to utilize as needed. Pt reports understanding of these resources. She denies further needs or questions for SW at this time. SW does not identify any further needs but will remain available and can re-visit as requested by pt or staff.    ADELA Mesa on 1/11/2024 at 1:27 PM

## 2024-01-11 NOTE — PLAN OF CARE
Problem: Postpartum ( Delivery)  Goal: Successful Parent Role Transition  Outcome: Progressing  Intervention: Support Parent Role Transition  Recent Flowsheet Documentation  Taken 2024 0800 by Stacey Kim RN  Supportive Measures: active listening utilized  Parent-Child Attachment Promotion: caring behavior modeled   Goal Outcome Evaluation:  Pt  bonding with , breastfeeding & supplementing with donor milk. Encouraged to be more independent with feedings as we near discharge. Nursing has been doing all feedings in nursery to allow mom to sleep. No support in room.   Sw Consult.    Problem: Postpartum ( Delivery)  Goal: Optimal Pain Control and Function  Outcome: Progressing  Intervention: Prevent or Manage Pain  Recent Flowsheet Documentation  Taken 2024 0800 by Stacey Kim RN  Pain Management Interventions: declines   POD # 3 Pain controlled with Tylenol/Ibuprofen & Oxycodone. Enc ambulation. Pt requesting to stay 1 more night. Provider Dr. Jose Luis noe. Reassess this  evening.      Breast pump sold to pt for discharge.  Birth Certificate- sent ROP with pt as S.O. not available today  Stacey Kim, RN

## 2024-01-11 NOTE — DISCHARGE SUMMARY
[facility]    Discharge Summary  Obstetrics    Date of Admission:  2024  Date of Discharge:  2024  Discharging Provider: Carlitos Amaya MD    Discharge Diagnoses   History of  section [Z98.891]  Request for sterilization [Z30.2]    Procedure/Surgery Information   Procedure: Procedure(s):  REPEAT  SECTION AND BILATERAL TUBAL LIGATION   Surgeon(s): Surgeon(s) and Role:     * Essence Perkins MD - Primary     * Dee Davidson MD - Assisting     History of Present Illness   Marisol Licea is a 33 year old female who presented with plan for repeat c section    Hospital Course   The patient's hospital course was unremarkable.  She recovered as anticipated and experienced no post-operative complications.  On discharge, her pain was well controlled. Vaginal bleeding is similar to peak menstrual flow.  Voiding without difficulty.  Ambulating well and tolerating a normal diet.  No fever or significant wound drainage.  Breastfeeding well.  Infant is stable.  She was discharged on post-partum day #3.    Post-partum hemoglobin:   Hemoglobin   Date Value Ref Range Status   2024 11.0 (L) 11.7 - 15.7 g/dL Final       [place] Depression Scale  Thoughts of Harming Self: Never  Total Score: 5      Carlitos Amaya MD    Discharge Disposition   Discharged to home   Condition at discharge: Good      Unresulted Labs Ordered in the Past 30 Days of this Admission       Date and Time Order Name Status Description    2024 12:59 PM Prepare red blood cells (unit) Preliminary     2024 12:59 PM Prepare red blood cells (unit) Preliminary             Primary Care Physician   Dee Davidson    Consultations This Hospital Stay   LACTATION IP CONSULT    Discharge Orders      Activity    Activity as tolerated     Reason for your hospital stay    Maternity care     Follow Up    Follow up with provider in 2 weeks and 6 weeks for post-delivery  checks     After your  Section    Check your incision each day for signs of infection: redness, drainage, warmth, or discomfort. Contact your provider if you have any of these signs or heavy vaginal bleeding.     Do NOT lift anything more than the baby or what you can lift with one hand such as a jug of milk. Avoid lifting with 2 hands and putting strain on your incision.     Check with your provider before taking tub baths.     It is OK to take showers, you may clean your incision with Hibiclens or soapy water and pat it dry. If you have white tapes (steri-strips) leave them on until they fall off.     You may start driving a car two weeks after delivery. You need to be off narcotic pain meds, be able to turn around and back-up the car, and slam on the brakes safely.     Gradually increase your physical activity and exercise level according to how comfortable or tired you feel. Walking and stairs are ok.     You should have an appointment with your surgeon in 2 weeks for an incision check     Postpartum Anemia    Helpful tips to increase the iron levels in your blood:     Take prenatal vitamins as recommended and extra iron supplement which you can buy over the counter (ferrous sulfate, ferrous gluconate, Slow Fe or slow release iron, Floradix, or liquid chlorophyll)     Consume a vitamin C source with supplement to improve absorption (citrus fruits, orange juice, potatoes, tomatoes, broccoli, green leafy vegetables, strawberries, or 500 mg table of vitamin C)     Avoid taking in dairy products or an antacid near the same time as you take your iron supplement     If stomach upset occurs, try taking supplement immediately after a meal instead of on an empty stomach or take it less frequently, just don't stop taking it completely.     If constipation occurs take a stool softener or fiber supplement daily.     Activity    Activity as tolerated     Reason for your hospital stay    Maternity care     Follow Up     Follow up with provider in 2 weeks and 6 weeks for post-delivery checks     After your  Section    Check your incision each day for signs of infection: redness, drainage, warmth, or discomfort. Contact your provider if you have any of these signs or heavy vaginal bleeding.     Do NOT lift anything more than the baby or what you can lift with one hand such as a jug of milk. Avoid lifting with 2 hands and putting strain on your incision.     Check with your provider before taking tub baths.     It is OK to take showers, you may clean your incision with Hibiclens or soapy water and pat it dry. If you have white tapes (steri-strips) leave them on until they fall off.     You may start driving a car two weeks after delivery. You need to be off narcotic pain meds, be able to turn around and back-up the car, and slam on the brakes safely.     Gradually increase your physical activity and exercise level according to how comfortable or tired you feel. Walking and stairs are ok.     You should have an appointment with your surgeon in 2 weeks for an incision check     Diet    Resume previous diet     Diet    Resume previous diet     Discharge Medications   Current Discharge Medication List        START taking these medications    Details   acetaminophen (TYLENOL) 325 MG tablet Take 3 tablets (975 mg) by mouth every 6 hours    Associated Diagnoses: S/P repeat low transverse       ibuprofen (ADVIL/MOTRIN) 800 MG tablet Take 1 tablet (800 mg) by mouth every 6 hours    Associated Diagnoses: S/P repeat low transverse       oxyCODONE (ROXICODONE) 5 MG tablet Take 1 tablet (5 mg) by mouth every 4 hours as needed for moderate to severe pain  Qty: 12 tablet, Refills: 0    Associated Diagnoses: S/P repeat low transverse       senna-docusate (SENOKOT-S/PERICOLACE) 8.6-50 MG tablet Take 1 tablet by mouth 2 times daily as needed for constipation    Associated Diagnoses: S/P repeat low transverse             CONTINUE these medications which have NOT CHANGED    Details   hydrOXYzine (ATARAX) 25 MG tablet Take 1-2 tablets (25-50 mg) by mouth nightly as needed (insomnia, sleep issues)  Qty: 60 tablet, Refills: 8    Associated Diagnoses: Generalized anxiety disorder; Depression affecting pregnancy      Magnesium Oxide, Laxative, (LAX) 500 MG TABS tablet Take 400 mg by mouth daily      Prenatal Vit-Fe Fumarate-FA (PRENATAL MULTIVITAMIN W/IRON) 27-0.8 MG tablet Take 1 tablet by mouth daily      sertraline (ZOLOFT) 25 MG tablet Take 1 tablet (25 mg) by mouth daily  Qty: 90 tablet, Refills: 3    Associated Diagnoses: Generalized anxiety disorder; Depression affecting pregnancy           Allergies   No Known Allergies

## 2024-01-11 NOTE — PROGRESS NOTES
All Discharge education completed including review of warning signs. Pt verbalized understanding & deny having additional questions. Follow-up is planned for  2 & 6 weeks.    Stacey Kim RN

## 2024-01-12 RX ORDER — VALACYCLOVIR HYDROCHLORIDE 1 G/1
2000 TABLET, FILM COATED ORAL 2 TIMES DAILY
Qty: 4 TABLET | Refills: 3 | Status: SHIPPED | OUTPATIENT
Start: 2024-01-12 | End: 2024-01-13

## 2024-01-12 NOTE — TELEPHONE ENCOUNTER
Dr Davidson,  Please see MyChart message from patient needing provider's direction.  Please respond directly to patient if appropriate.    TONY CornellN, RN  [facility]

## 2024-01-12 NOTE — PATIENT INSTRUCTIONS
Dear Marisol Licea,    We are sorry you are not feeling well. Based on the responses you provided, it is recommended that you be seen in-person in urgent care so we can better evaluate your symptoms. Please click here to find the nearest urgent care location to you.   You will not be charged for this Visit. Thank you for trusting us with your care.    Jenifer Swanson NP

## 2024-01-13 ENCOUNTER — MYC MEDICAL ADVICE (OUTPATIENT)
Dept: FAMILY MEDICINE | Facility: CLINIC | Age: 34
End: 2024-01-13
Payer: COMMERCIAL

## 2024-01-14 ENCOUNTER — MYC MEDICAL ADVICE (OUTPATIENT)
Dept: FAMILY MEDICINE | Facility: CLINIC | Age: 34
End: 2024-01-14
Payer: COMMERCIAL

## 2024-01-15 ENCOUNTER — NURSE TRIAGE (OUTPATIENT)
Dept: FAMILY MEDICINE | Facility: CLINIC | Age: 34
End: 2024-01-15

## 2024-01-15 ENCOUNTER — ALLIED HEALTH/NURSE VISIT (OUTPATIENT)
Dept: FAMILY MEDICINE | Facility: CLINIC | Age: 34
End: 2024-01-15
Payer: COMMERCIAL

## 2024-01-15 ENCOUNTER — TELEPHONE (OUTPATIENT)
Dept: FAMILY MEDICINE | Facility: CLINIC | Age: 34
End: 2024-01-15

## 2024-01-15 ENCOUNTER — HOSPITAL ENCOUNTER (EMERGENCY)
Facility: CLINIC | Age: 34
Discharge: HOME | End: 2024-01-15
Attending: EMERGENCY MEDICINE | Admitting: EMERGENCY MEDICINE
Payer: COMMERCIAL

## 2024-01-15 VITALS
DIASTOLIC BLOOD PRESSURE: 86 MMHG | RESPIRATION RATE: 24 BRPM | HEART RATE: 77 BPM | TEMPERATURE: 97.9 F | SYSTOLIC BLOOD PRESSURE: 136 MMHG | OXYGEN SATURATION: 99 %

## 2024-01-15 VITALS
HEIGHT: 64 IN | TEMPERATURE: 98.2 F | DIASTOLIC BLOOD PRESSURE: 80 MMHG | BODY MASS INDEX: 37.93 KG/M2 | HEART RATE: 77 BPM | OXYGEN SATURATION: 98 % | SYSTOLIC BLOOD PRESSURE: 131 MMHG | RESPIRATION RATE: 14 BRPM

## 2024-01-15 DIAGNOSIS — O13.9 GESTATIONAL HYPERTENSION, ANTEPARTUM: Primary | ICD-10-CM

## 2024-01-15 DIAGNOSIS — Z98.891 S/P C-SECTION: Primary | ICD-10-CM

## 2024-01-15 LAB
ALBUMIN SERPL BCG-MCNC: 4.3 G/DL (ref 3.5–5.2)
ALBUMIN UR-MCNC: 50 MG/DL
ALP SERPL-CCNC: 119 U/L (ref 40–150)
ALT SERPL W P-5'-P-CCNC: 26 U/L (ref 0–50)
ANION GAP SERPL CALCULATED.3IONS-SCNC: 10 MMOL/L (ref 7–15)
APPEARANCE UR: ABNORMAL
AST SERPL W P-5'-P-CCNC: 22 U/L (ref 0–45)
BASOPHILS # BLD AUTO: 0 10E3/UL (ref 0–0.2)
BASOPHILS NFR BLD AUTO: 1 %
BILIRUB DIRECT SERPL-MCNC: <0.2 MG/DL (ref 0–0.3)
BILIRUB SERPL-MCNC: 0.2 MG/DL
BILIRUB UR QL STRIP: NEGATIVE
BUN SERPL-MCNC: 13.2 MG/DL (ref 6–20)
CALCIUM SERPL-MCNC: 9.6 MG/DL (ref 8.6–10)
CHLORIDE SERPL-SCNC: 104 MMOL/L (ref 98–107)
COLOR UR AUTO: YELLOW
CREAT SERPL-MCNC: 0.8 MG/DL (ref 0.51–0.95)
DEPRECATED HCO3 PLAS-SCNC: 26 MMOL/L (ref 22–29)
EGFRCR SERPLBLD CKD-EPI 2021: >90 ML/MIN/1.73M2
EOSINOPHIL # BLD AUTO: 0.4 10E3/UL (ref 0–0.7)
EOSINOPHIL NFR BLD AUTO: 6 %
ERYTHROCYTE [DISTWIDTH] IN BLOOD BY AUTOMATED COUNT: 12.5 % (ref 10–15)
FLUAV RNA SPEC QL NAA+PROBE: NEGATIVE
FLUBV RNA RESP QL NAA+PROBE: NEGATIVE
GLUCOSE SERPL-MCNC: 82 MG/DL (ref 70–99)
GLUCOSE UR STRIP-MCNC: NEGATIVE MG/DL
HCT VFR BLD AUTO: 38.6 % (ref 35–47)
HGB BLD-MCNC: 13 G/DL (ref 11.7–15.7)
HGB UR QL STRIP: ABNORMAL
IMM GRANULOCYTES # BLD: 0.1 10E3/UL
IMM GRANULOCYTES NFR BLD: 1 %
KETONES UR STRIP-MCNC: NEGATIVE MG/DL
LDH SERPL L TO P-CCNC: 274 U/L (ref 0–250)
LEUKOCYTE ESTERASE UR QL STRIP: ABNORMAL
LYMPHOCYTES # BLD AUTO: 1.6 10E3/UL (ref 0.8–5.3)
LYMPHOCYTES NFR BLD AUTO: 21 %
MAGNESIUM SERPL-MCNC: 2.2 MG/DL (ref 1.7–2.3)
MCH RBC QN AUTO: 30 PG (ref 26.5–33)
MCHC RBC AUTO-ENTMCNC: 33.7 G/DL (ref 31.5–36.5)
MCV RBC AUTO: 89 FL (ref 78–100)
MONOCYTES # BLD AUTO: 0.3 10E3/UL (ref 0–1.3)
MONOCYTES NFR BLD AUTO: 4 %
MUCOUS THREADS #/AREA URNS LPF: PRESENT /LPF
NEUTROPHILS # BLD AUTO: 5.3 10E3/UL (ref 1.6–8.3)
NEUTROPHILS NFR BLD AUTO: 67 %
NITRATE UR QL: NEGATIVE
NRBC # BLD AUTO: 0 10E3/UL
NRBC BLD AUTO-RTO: 0 /100
PH UR STRIP: 8 [PH] (ref 5–7)
PLATELET # BLD AUTO: 459 10E3/UL (ref 150–450)
POTASSIUM SERPL-SCNC: 4.1 MMOL/L (ref 3.4–5.3)
PROT SERPL-MCNC: 7.9 G/DL (ref 6.4–8.3)
RBC # BLD AUTO: 4.34 10E6/UL (ref 3.8–5.2)
RBC URINE: >182 /HPF
RSV RNA SPEC NAA+PROBE: NEGATIVE
SARS-COV-2 RNA RESP QL NAA+PROBE: NEGATIVE
SODIUM SERPL-SCNC: 140 MMOL/L (ref 135–145)
SP GR UR STRIP: 1.02 (ref 1–1.03)
SQUAMOUS EPITHELIAL: 1 /HPF
URATE SERPL-MCNC: 6.6 MG/DL (ref 2.4–5.7)
UROBILINOGEN UR STRIP-MCNC: <2 MG/DL
WBC # BLD AUTO: 7.7 10E3/UL (ref 4–11)
WBC URINE: 11 /HPF

## 2024-01-15 PROCEDURE — 99207 PR NO CHARGE NURSE ONLY: CPT

## 2024-01-15 PROCEDURE — 87637 SARSCOV2&INF A&B&RSV AMP PRB: CPT | Performed by: STUDENT IN AN ORGANIZED HEALTH CARE EDUCATION/TRAINING PROGRAM

## 2024-01-15 PROCEDURE — 80053 COMPREHEN METABOLIC PANEL: CPT | Performed by: STUDENT IN AN ORGANIZED HEALTH CARE EDUCATION/TRAINING PROGRAM

## 2024-01-15 PROCEDURE — 83615 LACTATE (LD) (LDH) ENZYME: CPT | Performed by: STUDENT IN AN ORGANIZED HEALTH CARE EDUCATION/TRAINING PROGRAM

## 2024-01-15 PROCEDURE — 87086 URINE CULTURE/COLONY COUNT: CPT | Performed by: STUDENT IN AN ORGANIZED HEALTH CARE EDUCATION/TRAINING PROGRAM

## 2024-01-15 PROCEDURE — 250N000013 HC RX MED GY IP 250 OP 250 PS 637: Performed by: STUDENT IN AN ORGANIZED HEALTH CARE EDUCATION/TRAINING PROGRAM

## 2024-01-15 PROCEDURE — 250N000011 HC RX IP 250 OP 636: Performed by: STUDENT IN AN ORGANIZED HEALTH CARE EDUCATION/TRAINING PROGRAM

## 2024-01-15 PROCEDURE — 36415 COLL VENOUS BLD VENIPUNCTURE: CPT | Performed by: STUDENT IN AN ORGANIZED HEALTH CARE EDUCATION/TRAINING PROGRAM

## 2024-01-15 PROCEDURE — 99284 EMERGENCY DEPT VISIT MOD MDM: CPT | Mod: 25

## 2024-01-15 PROCEDURE — 99285 EMERGENCY DEPT VISIT HI MDM: CPT | Mod: 25

## 2024-01-15 PROCEDURE — 250N000013 HC RX MED GY IP 250 OP 250 PS 637: Performed by: EMERGENCY MEDICINE

## 2024-01-15 PROCEDURE — 85025 COMPLETE CBC W/AUTO DIFF WBC: CPT | Performed by: STUDENT IN AN ORGANIZED HEALTH CARE EDUCATION/TRAINING PROGRAM

## 2024-01-15 PROCEDURE — 81001 URINALYSIS AUTO W/SCOPE: CPT | Performed by: STUDENT IN AN ORGANIZED HEALTH CARE EDUCATION/TRAINING PROGRAM

## 2024-01-15 PROCEDURE — 84550 ASSAY OF BLOOD/URIC ACID: CPT | Performed by: STUDENT IN AN ORGANIZED HEALTH CARE EDUCATION/TRAINING PROGRAM

## 2024-01-15 PROCEDURE — 83735 ASSAY OF MAGNESIUM: CPT | Performed by: STUDENT IN AN ORGANIZED HEALTH CARE EDUCATION/TRAINING PROGRAM

## 2024-01-15 RX ORDER — ONDANSETRON 2 MG/ML
4 INJECTION INTRAMUSCULAR; INTRAVENOUS ONCE
Status: COMPLETED | OUTPATIENT
Start: 2024-01-15 | End: 2024-01-15

## 2024-01-15 RX ORDER — NIFEDIPINE 30 MG
30 TABLET, EXTENDED RELEASE ORAL ONCE
Status: COMPLETED | OUTPATIENT
Start: 2024-01-15 | End: 2024-01-15

## 2024-01-15 RX ORDER — NIFEDIPINE 30 MG
30 TABLET, EXTENDED RELEASE ORAL DAILY
Qty: 10 TABLET | Refills: 0 | Status: SHIPPED | OUTPATIENT
Start: 2024-01-15 | End: 2024-02-13

## 2024-01-15 RX ORDER — ACETAMINOPHEN 325 MG/1
650 TABLET ORAL ONCE
Status: COMPLETED | OUTPATIENT
Start: 2024-01-15 | End: 2024-01-15

## 2024-01-15 RX ADMIN — ONDANSETRON 4 MG: 2 INJECTION INTRAMUSCULAR; INTRAVENOUS at 18:10

## 2024-01-15 RX ADMIN — NIFEDIPINE 30 MG: 30 TABLET, EXTENDED RELEASE ORAL at 19:49

## 2024-01-15 RX ADMIN — ACETAMINOPHEN 650 MG: 325 TABLET ORAL at 18:55

## 2024-01-15 ASSESSMENT — ACTIVITIES OF DAILY LIVING (ADL)
ADLS_ACUITY_SCORE: 35
ADLS_ACUITY_SCORE: 33

## 2024-01-15 NOTE — ED PROVIDER NOTES
Emergency Department Encounter   NAME: Marisol Licea ; AGE: 33 year old female ; YOB: 1990 ; MRN: [id number] ; PCP: Dee Davidson   ED PROVIDER: Amy Mcneill PA-C    Evaluation Date & Time:   1/15/2024  4:52 PM    CHIEF COMPLAINT:  Hypertension        Impression and Plan   FINAL IMPRESSION:    ICD-10-CM    1. Gestational hypertension, antepartum  O13.9 Home Blood Pressure Monitor Order      2. Mild pre-eclampsia, postpartum  O14.05           MDM:  Marisol Licea is a 33 year old female with PMH of PCOS and 1 week s/p  24 presenting to the ED from her [facility] practice clinic for evaluation of high blood pressure. She states she had a headache for about 30 hours that went away yesterday afternoon. She does not currently have a headache. She denies any associated vision changes but states the pain was present bilaterally across the front of her forehead with associated nausea and photophobia.  She states she has had headaches in the past but this felt quite a bit worse and lasted much longer than any previous headache she has had. She denies any history of migraines. She states she has had nausea the past few days and has been using Zofran as needed. She also endorses occasional chills, no fevers.  No cough, sore throat, or bodyaches. Denies abdominal pain or urinary symptoms.    Vitals reviewed and unremarkable aside from a BP of 139/82. On exam she is resting comfortably, she is not ill appearing. No focal neurologic deficits or AMS.  Abdomen soft and nontender.  She has very mild edema of the ankles bilaterally, no facial swelling. Differential diagnosis includes but not limited to tension headache, migraine, preeclampsia, HELLP syndrome,  site infection, stroke. Her  incision looks very good, no dehiscence or surrounding edema, erythema, or pus drainage from the incision site to indicate infection. I do not suspect stroke as she does not have any  focal neurologic deficits or headache currently and is overall very well-appearing and speaking in full sentences. Her CBC found no thrombocytopenia or LFT changes to indicated HELLP syndrome.  Mg level within normal range. Creatinine is not elevated.    UA found 50 mg/dL of protein present today. There is also blood present in her urine with 25 leukocyte esterase. Her UA has reflexed to culture and she will be called if the results warrant starting an antibiotic. LDH is mildly elevated at 274. Uric acid is also elevated at 6.6. Repeat /91 indicating possible pre-eclampsia in the setting of mild proteinuria and elevated LDH and uric acid. I spoke with Dr. Richard from OB/Gyn regarding the case. She felt that this was overall very mild and should resolve fairly quickly with Nifedipine. Will plan to start her on Nifedipine 30 mg daily and have her follow up with OB at the end of the week for re-check. She was given a BP cuff to take home. I recommended she record her BP in a journal a few times per day and bring this to her follow-up visit later this week with OB. We also discussed her very mild leg swelling, I do not feel a diuretic is indicated today given the minimal amount of swelling and risk for contaminating breast milk as patient currently breastfeeding. Patient is in agreement.  I recommended frequent ambulation and compression stockings to help with swelling. We discussed concerning symptoms that would warrant return to the emergency department, she is understanding and agreeable to this plan.        Medical Decision Making    History:  Supplemental history from: Documented in chart  External Record(s) reviewed: I personally performed chart review of family practice visit from 1/15/2024 and hospital admission for  on 2024    Work Up:  Chart documentation includes differential considered and any EKGs or imaging independently interpreted by provider, where specified.  In additional to work  up documented, I considered the following work up: Documented in chart, if applicable.    External consultation:  Discussion of management with another provider: Dr. Pascual, Dr. Richard    Complicating factors:  Care impacted by chronic illness: N/A  Care affected by social determinants of health: N/A    Disposition considerations: Discharge. I prescribed additional prescription strength medication(s) as charted. See documentation for any additional details.      ED COURSE:  5:40 PM I met and introduced myself to the patient. I gathered initial history and performed my physical exam. We discussed plan for initial workup.   6:00 PM I have staffed the patient with Dr. Pascual, ED MD, who has evaluated the patient and agrees with all aspects of today's care.   8:05 PM I rechecked the patient and discussed results, discharge, follow up, and reasons to return to the ED.     At the conclusion of the encounter I discussed the results of all the tests and the disposition. The questions were answered. The patient or family acknowledged understanding and was agreeable with the care plan.        MEDICATIONS GIVEN IN THE EMERGENCY DEPARTMENT:  Medications   ondansetron (ZOFRAN) injection 4 mg (4 mg Intravenous $Given 1/15/24 1810)   acetaminophen (TYLENOL) tablet 650 mg (650 mg Oral $Given 1/15/24 1855)   NIFEdipine ER (ADALAT CC) 24 hr tablet 30 mg (30 mg Oral $Given 1/15/24 1949)         NEW PRESCRIPTIONS STARTED AT TODAY'S ED VISIT:  New Prescriptions    NIFEDIPINE ER (ADALAT CC) 30 MG 24 HR TABLET    Take 1 tablet (30 mg) by mouth daily for 10 days         HPI   Patient information was obtained from: patient  Use of Intrepreter: N/A     Marisol Licea is a 33 year old female with PMH of PCOS and 1 week s/p  24 presenting to the ED from her family practice clinic for evaluation of high blood pressure. She states she had a headache for about 30 hours that went away yesterday afternoon. She does not  currently have a headache. She denies any associated vision changes but states the pain was present bilaterally across the front of her forehead with associated nausea and photophobia.  She states she has had headaches in the past but this felt quite a bit worse and lasted much longer than any previous headache she has had. She denies any history of migraines. She states she has had nausea the past few days and has been using Zofran as needed. She also endorses occasional chills, no fevers.    Medical History     Past Medical History:   Diagnosis Date    Encounter for induction of labor 2022    Failed induction of labor 2022    S/P  2022       Past Surgical History:   Procedure Laterality Date     SECTION N/A 2022    Procedure:  SECTION;  Surgeon: Essence Perkins MD;  Location: [facility]     SECTION, TUBAL LIGATION, COMBINED Bilateral 2024    Procedure: REPEAT  SECTION AND BILATERAL TUBAL LIGATION;  Surgeon: Essence Perkins MD;  Location: [facility] OR    HEMORRHOIDECTOMY      LAPAROSCOPIC HERNIORRHAPHY UMBILICAL N/A 3/9/2023    Procedure: HERNIORRHAPHY, UMBILICAL, LAPAROSCOPIC;  Surgeon: Sahil Méndez MD;  Location: [facility] OR       Family History   Problem Relation Age of Onset    No Known Problems Mother     No Known Problems Father     No Known Problems Sister     No Known Problems Brother     Chronic Obstructive Pulmonary Disease Maternal Grandmother     Chronic Obstructive Pulmonary Disease Maternal Grandfather     Other - See Comments Maternal Grandfather         heart condition    Cancer Paternal Grandmother     Myocardial Infarction Paternal Grandfather        Social History     Tobacco Use    Smoking status: Former     Packs/day: .25     Types: Cigarettes     Quit date: 2022     Years since quittin.8     Passive exposure: Never    Smokeless tobacco: Never   Vaping Use    Vaping Use: Never  "used   Substance Use Topics    Alcohol use: Not Currently    Drug use: Not Currently       NIFEdipine ER (ADALAT CC) 30 MG 24 hr tablet  acetaminophen (TYLENOL) 325 MG tablet  hydrOXYzine (ATARAX) 25 MG tablet  ibuprofen (ADVIL/MOTRIN) 800 MG tablet  Magnesium Oxide, Laxative, (LAX) 500 MG TABS tablet  oxyCODONE (ROXICODONE) 5 MG tablet  Prenatal Vit-Fe Fumarate-FA (PRENATAL MULTIVITAMIN W/IRON) 27-0.8 MG tablet  senna-docusate (SENOKOT-S/PERICOLACE) 8.6-50 MG tablet  sertraline (ZOLOFT) 25 MG tablet  valACYclovir (VALTREX) 1000 mg tablet          Physical Exam     First Vitals:  Patient Vitals for the past 24 hrs:   BP Temp Temp src Pulse Resp SpO2 Height   01/15/24 2000 131/80 -- -- -- -- -- --   01/15/24 1930 128/76 -- -- -- -- -- --   01/15/24 1909 139/75 -- -- -- -- -- --   01/15/24 1830 136/82 -- -- -- -- -- --   01/15/24 1815 (!) 149/91 -- -- -- -- -- --   01/15/24 1800 (!) 147/93 -- -- -- -- -- --   01/15/24 1745 (!) 153/96 -- -- -- -- -- --   01/15/24 1742 (!) 146/91 -- -- -- -- -- --   01/15/24 1646 139/82 98.2  F (36.8  C) Temporal 77 14 98 % 1.626 m (5' 4\")         PHYSICAL EXAM    General Appearance:  Alert, cooperative, no distress, appears stated age. She is not ill appearing. GCS 15. No facial swelling.  Respiratory: No distress. Lungs clear to ausculation bilaterally. No wheezes, rhonchi or stridor  Cardiovascular: Regular rate and rhythm, no murmur. Normal cap refill. No peripheral edema  GI: Abdomen soft, nontender  Musculoskeletal: Moving all extremities. No gross deformities  Integument: Warm, dry, no rashes or lesions. There is a well healing horizontal  incision present in the suprapubic region, no evidence of dehiscence.  There is no surrounding edema, erythema, or pus drainage.  There is 1+ pitting edema present bilaterally in both ankles, this does not extend past the distal one third of the calf.  Neurologic: Alert and orientated x3. No focal deficits. Cranial nerves III through " XII intact.  No facial asymmetry.  Sensation to light touch intact to face and all extremities.  Finger/nose/finger intact.  Intact straight leg raise.  5 out of 5 strength with , flexion extension at elbow joints, flexion at shoulder and hip joint against resistance.  Dorsiflexion and plantarflexion are intact.  Answering questions appropriately with normal speech.  No aphasia or dysarthria.  Following commands.    Psych: Normal mood and affect        Results     LAB:  All pertinent labs reviewed and interpreted  Labs Ordered and Resulted from Time of ED Arrival to Time of ED Departure   ROUTINE UA WITH MICROSCOPIC REFLEX TO CULTURE - Abnormal       Result Value    Color Urine Yellow      Appearance Urine Turbid (*)     Glucose Urine Negative      Bilirubin Urine Negative      Ketones Urine Negative      Specific Gravity Urine 1.018      Blood Urine >1.0 mg/dL (*)     pH Urine 8.0 (*)     Protein Albumin Urine 50 (*)     Urobilinogen Urine <2.0      Nitrite Urine Negative      Leukocyte Esterase Urine 25 [place]/uL (*)     Mucus Urine Present (*)     RBC Urine >182 (*)     WBC Urine 11 (*)     Squamous Epithelials Urine 1     LACTATE DEHYDROGENASE - Abnormal    Lactate Dehydrogenase 274 (*)    CBC WITH PLATELETS AND DIFFERENTIAL - Abnormal    WBC Count 7.7      RBC Count 4.34      Hemoglobin 13.0      Hematocrit 38.6      MCV 89      MCH 30.0      MCHC 33.7      RDW 12.5      Platelet Count 459 (*)     % Neutrophils 67      % Lymphocytes 21      % Monocytes 4      % Eosinophils 6      % Basophils 1      % Immature Granulocytes 1      NRBCs per 100 WBC 0      Absolute Neutrophils 5.3      Absolute Lymphocytes 1.6      Absolute Monocytes 0.3      Absolute Eosinophils 0.4      Absolute Basophils 0.0      Absolute Immature Granulocytes 0.1      Absolute NRBCs 0.0     URIC ACID - Abnormal    Uric Acid 6.6 (*)    HEPATIC FUNCTION PANEL - Normal    Protein Total 7.9      Albumin 4.3      Bilirubin Total 0.2      Alkaline  Phosphatase 119      AST 22      ALT 26      Bilirubin Direct <0.20     BASIC METABOLIC PANEL - Normal    Sodium 140      Potassium 4.1      Chloride 104      Carbon Dioxide (CO2) 26      Anion Gap 10      Urea Nitrogen 13.2      Creatinine 0.80      GFR Estimate >90      Calcium 9.6      Glucose 82     MAGNESIUM - Normal    Magnesium 2.2     INFLUENZA A/B, RSV, & SARS-COV2 PCR - Normal    Influenza A PCR Negative      Influenza B PCR Negative      RSV PCR Negative      SARS CoV2 PCR Negative     URINE CULTURE       RADIOLOGY:  No orders to display             Amy Mcneill PA-C   Emergency Medicine   [facility] EMERGENCY ROOM            Amy Mcneill PA-C  01/15/24 2033       Amy Mcneill PA-C  01/15/24 2036

## 2024-01-15 NOTE — TELEPHONE ENCOUNTER
I am also not worried about the incision as she describes it - sounds like normal healing    We are not able to discuss her concerns at child's appointment, would need her own appointment and at this time I do not have extra openings on 1/17. Ideally pt would check blood pressure at home/pharmacy/clinic today because if elevated then needs to be seen immediately for possible postpartum preeclampsia

## 2024-01-15 NOTE — ED TRIAGE NOTES
Pt reports she was here from her OB clinic due to postpartum high blood pressure.  Pt is one week post  delivery with no complications during pregnancy of 38 weeks.  Pt reports has been having headaches at home but none today along with nausea.

## 2024-01-15 NOTE — PROGRESS NOTES
Patient came in for BP check as instructed by PCP.    Vitals at 1524:  /88 left arm, sitting, large cuff, manual  Temp 97.9  P 77  RR 24  O2 sats 99%     BP rechecked at 1535: 136/86, left arm, large cuff, sitting, manual.    Please see Nurse Triage encounter from today for details. Patient headed to  ER for further eval.    TONY CornellN, RN  [facility]

## 2024-01-15 NOTE — TELEPHONE ENCOUNTER
"S-(situation):   Patient calling clinic to discuss [employer] messages,  \"Hi!      I just noticed my incision is bleeding a little bit. It's hardly anything on my underwear but it's still bleeding      Don't know what that means or anything but just thought I should let you know\"     And      \"I've had a pounding headache for over 24 hours now and Tylenol and ibuprofen don't do anything. Is this normal? Is there something to alleviate this?\"     B-(background):    on 24    A-(assessment):   Denies headache currently   No BP concerns during pregnancy   Headache that patient discussed in [employer] lasted for about 30 hours - tylenol and ibuprofen did not provide relief    Incision bleeding was very minimal - has stopped   No openings or concerns that any stitches opened   No new or concerning pain - feels same as 1st  - hurts more on right then on left     R-(recommendations):   Recommend that if headache return patient call clinic and ask to speak to a nurse to triage symptoms. Patient agreed.     Patient only wants to give FYI to PCP about incision, she is not concerned about it at this time. Writer did recommended that if bleeding worsens or there are concerns that any stitches have opened up to call clinic. Patient agreed.     Patient's baby has weight/feeding check on 24 and patient is wondering if she could check in with PCP about these concerns at appointment or if she needs a separate appointment.     Routing to provider to review and advise.    DENIS Xie, RN  [facility]    "

## 2024-01-15 NOTE — TELEPHONE ENCOUNTER
Writer called patient and relayed message per PCP. Patient does not have a BP cuff at home. Writer was able to schedule patient in clinic for a BP check today.    DENIS Xie, RN  [facility]

## 2024-01-15 NOTE — TELEPHONE ENCOUNTER
"Writer called patient to discuss both of her mychart messages,    \"Hi!      I just noticed my incision is bleeding a little bit. It's hardly anything on my underwear but it's still bleeding      Don't know what that means or anything but just thought I should let you know\"    And     \"I've had a pounding headache for over 24 hours now and Tylenol and ibuprofen don't do anything. Is this normal? Is there something to alleviate this?\"       Left message for patient to return call to clinic and mychart sent to patient requesting she call clinic. If patient returns call please route to nurse queue to triage symptoms.    TONY XieN, RN  [facility]      "

## 2024-01-15 NOTE — TELEPHONE ENCOUNTER
Nurse Triage SBAR    Is this a 2nd Level Triage? NO    Situation:   Patient came in for BP check but complained of feeling nauseated.     Background:   Delivered baby on 23 via .  Anxiety.    Assessment:   Patient stated that she had a headache continuously for 30 some hours 500 mg Tylenol and 800mg IBU Q6H were not helping. Took some Oxy and it helps. Headache slowly went away and pt denied having a headache now,    Patient stated she was told to come in to check her BP.     Vitals at 1524:  /88 left arm, sitting, large cuff, manual  Temp 97.9  P 77  RR 24  O2 sats 99%    BP rechecked at 1535: 136/86, left arm, large cuff, sitting, manual.    Feeling nauseous since this morning around 10 AM. Haven't taken anything for it, has some zofran from pregnancy but haven't taken it.    A friend was over earlier and she was feeling like she have to stop talking to catch her breath as they were talking to each other. Standing at kitchen table talking with friend and feeling lightheadedness and dizzy.    Has anxiety and feeling lightheadedness.   Doesn't feel right since having the headache. She feels as though something is wrong.    No problem without urination or BM. Have to remind self to urinate d/t not having sensation to go.     Nausea is triggering her anxiety. When feeling off then anxiety started to increase.    Denied chest pain.  SOB when talking earlier.  Denied sore throat.  Denied fever, but feeling cold.  Denied body aches.  Denied visual disturbance.   Eating and drinking okay.    Last dose of oxycodone was last night. Been home since Thursday and have only taken 2 tab of oxycodone.      Protocol Recommended Disposition:   Home Care    Recommendation:   Huddled with Dr Carey and left voicemail to return call for Dr Davidson. Patient stated that she is comfortable going to be seen at ER as that's what the previous nurse had told her.      Per Telephone call encounter from today, Dr Davidson  "stated:  \"...Ideally pt would check blood pressure at home/pharmacy/clinic today because if elevated then needs to be seen immediately for possible postpartum preeclampsia.\"    Patient stated that her boyfriend is in the car waiting for her, so she will just have him take her to the closest ER ([facility]).       Patient headed to  ER for further eval d/t elevated BP.    Does the patient meet one of the following criteria for ADS visit consideration? 16+ years old, with an [facility] PCP     TIP  Providers, please consider if this condition is appropriate for management at one of our Acute and Diagnostic Services sites.     If patient is a good candidate, please use dotphrase <dot>triageresponse and select Refer to ADS to document.      Reason for Disposition   Unexplained nausea    Additional Information   Negative: Shock suspected (e.g., cold/pale/clammy skin, too weak to stand, low BP, rapid pulse)   Negative: Sounds like a life-threatening emergency to the triager   Negative: Menstrual Period - Missed or Late (i.e., pregnancy suspected)   Negative: Nausea or vomiting and pregnancy < 20 weeks   Negative: Heat exhaustion suspected (i.e., dehydration from heat exposure)   Negative: Anxiety or stress suspected (i.e., nausea with anxiety attacks or stressful situations)   Negative: Traumatic Brain Injury (TBI) suspected   Negative: Vomiting occurs   Negative: Other symptom is present, see that guideline.  (e.g., chest pain, headache, dizziness, abdominal pain, colds, sore throat, etc.).   Negative: Unable to walk, or can only walk with assistance (e.g., requires support)   Negative: Difficulty breathing   Negative: Insulin-dependent diabetes (Type I) and glucose > 400 mg/dL (22 mmol/L)   Negative: Drinking very little and dehydration suspected (e.g., no urine > 12 hours, very dry mouth, very lightheaded)   Negative: Patient sounds very sick or weak to the triager   Negative: Fever > 104 F  (40 C)   Negative: Fever > 101 " F  (38.3 C) and over 60 years of age   Negative: Fever > 100.0 F  (37.8 C) and bedridden (e.g., CVA, chronic illness, recovering from surgery)   Negative: Fever > 100.0 F  (37.8 C) and diabetes mellitus or weak immune system (e.g., HIV positive, cancer chemo, splenectomy, chronic steroids)   Negative: Taking any of the following medications: digoxin (Lanoxin), lithium, theophylline, phenytoin (Dilantin)   Negative: Yellowish color of the skin or white of the eye (i.e., jaundice)   Negative: Fever present > 3 days (72 hours)   Negative: Patient wants to be seen   Negative: Receiving cancer chemotherapy medication   Negative: Taking prescription medication that could cause nausea (e.g., narcotics/opiates, antibiotics, OCPs, many others)   Negative: Nausea lasts > 1 week   Negative: Substance use (drug use) or unhealthy alcohol use, known or suspected   Negative: Nausea is a chronic symptom (recurrent or ongoing AND present > 4 weeks)    Protocols used: Nausea-A-OH    TONY CornellN, RN  [facility]

## 2024-01-16 NOTE — DISCHARGE INSTRUCTIONS
You were seen in the emergency department for high blood pressure.  Your blood pressure and lab work today revealed possible mild preeclampsia.  We will plan to start you on a calcium channel blocker medication for blood pressure called nifedipine. Please take 30 mg once per day. Please take your blood pressure 3-4 times throughout the day and document the readings on a notepad or journal. I spoke with OB/Gyn tonight and they felt you will likely get better quickly.  I recommend calling [facility]/OB clinic to schedule a follow up for later this week and bring your blood pressure readings to this visit.    Return to the ED if you get a blood pressure reading higher than 160/110 or you develop severe worsening headaches, severe upper abdominal pain, difficulty breathing, vision changes, or altered mental status.   Patient Education Med Reconciliation Misc

## 2024-01-16 NOTE — ED PROVIDER NOTES
"Emergency Department Midlevel Supervisory Note     I had a face to face encounter with this patient seen by the Advanced Practice Provider (TEN). I personally made/approved the management plan and take responsibility for the patient management. I personally saw patient and performed a substantive portion of the visit including all aspects of the medical decision making.     ED Course:  5:50 PM Amy Mcneill PA-C staffed patient with me. I agree with their assessment and plan of management, and I will see the patient.  6:10 PM I met with the patient to introduce myself, gather additional history, perform my initial exam, and discuss the plan.     Brief HPI:     Marisol Licea is a 33 year old female who presents for evaluation of high blood pressure in the setting of recent .  Had associated headache, now resolved.      Brief Physical Exam: /80   Pulse 77   Temp 98.2  F (36.8  C) (Temporal)   Resp 14   Ht 1.626 m (5' 4\")   LMP  (LMP Unknown)   SpO2 98%   Breastfeeding Yes   BMI 37.93 kg/m    Constitutional:  Alert, in no acute distress  EYES: Conjunctivae clear  HENT:  Atraumatic  Respiratory:  Respirations even, unlabored, in no acute respiratory distress  Cardiovascular:  Regular rate and rhythm, good peripheral perfusion  GI: Soft, non-distended, non-tender  Musculoskeletal:  Moves all 4 extremities equally, grossly symmetrical strength  Integument: Warm & dry. No appreciable rash, erythema.  Neurologic:  Alert & oriented, speech clear and fluent, no focal deficits noted  Psych: Normal mood and affect       MDM:  Again, patient is 33-year-old female who presents with high blood pressure.  Patient is afebrile with no tachycardia or hypoxia.  Exam is benign, patient has no reproducible abdominal tenderness on my exam, no chest or pulmonary complaints, lung sounds are clear.  Denies any falls or trauma, neurologic exam is completely benign.  States that she had recent headache but now " resolved.  Patient underwent recent  section performed on 2024.  Was seen by primary today noted to have high blood pressure and sent to the emergency department.  Certainly considered preeclampsia, nothing to suggest hypertensive crisis as she has a benign neurologic exam, she is not altered or confused, no chest or pulmonary complaints suggest ACS, CHF.  Considered head CT though she has a benign neurologic exam and again headache resolved, no falls or trauma, nothing suggest depressed call fracture or intracranial bleed, no indication for emergent CT imaging at this time.  We will obtain screening labs and urine studies.       1. Gestational hypertension, antepartum    2. Mild pre-eclampsia, postpartum        Consults:    Labs and Imaging:  Results for orders placed or performed during the hospital encounter of 01/15/24   UA with Microscopic reflex to Culture    Specimen: Urine, Clean Catch   Result Value Ref Range    Color Urine Yellow Colorless, Straw, Light Yellow, Yellow    Appearance Urine Turbid (A) Clear    Glucose Urine Negative Negative mg/dL    Bilirubin Urine Negative Negative    Ketones Urine Negative Negative mg/dL    Specific Gravity Urine 1.018 1.001 - 1.030    Blood Urine >1.0 mg/dL (A) Negative    pH Urine 8.0 (H) 5.0 - 7.0    Protein Albumin Urine 50 (A) Negative mg/dL    Urobilinogen Urine <2.0 <2.0 mg/dL    Nitrite Urine Negative Negative    Leukocyte Esterase Urine 25 Kamryn/uL (A) Negative    Mucus Urine Present (A) None Seen /LPF    RBC Urine >182 (H) <=2 /HPF    WBC Urine 11 (H) <=5 /HPF    Squamous Epithelials Urine 1 <=1 /HPF   Hepatic function panel   Result Value Ref Range    Protein Total 7.9 6.4 - 8.3 g/dL    Albumin 4.3 3.5 - 5.2 g/dL    Bilirubin Total 0.2 <=1.2 mg/dL    Alkaline Phosphatase 119 40 - 150 U/L    AST 22 0 - 45 U/L    ALT 26 0 - 50 U/L    Bilirubin Direct <0.20 0.00 - 0.30 mg/dL   Basic metabolic panel   Result Value Ref Range    Sodium 140 135 - 145 mmol/L     Potassium 4.1 3.4 - 5.3 mmol/L    Chloride 104 98 - 107 mmol/L    Carbon Dioxide (CO2) 26 22 - 29 mmol/L    Anion Gap 10 7 - 15 mmol/L    Urea Nitrogen 13.2 6.0 - 20.0 mg/dL    Creatinine 0.80 0.51 - 0.95 mg/dL    GFR Estimate >90 >60 mL/min/1.73m2    Calcium 9.6 8.6 - 10.0 mg/dL    Glucose 82 70 - 99 mg/dL   Result Value Ref Range    Lactate Dehydrogenase 274 (H) 0 - 250 U/L   CBC with platelets and differential   Result Value Ref Range    WBC Count 7.7 4.0 - 11.0 10e3/uL    RBC Count 4.34 3.80 - 5.20 10e6/uL    Hemoglobin 13.0 11.7 - 15.7 g/dL    Hematocrit 38.6 35.0 - 47.0 %    MCV 89 78 - 100 fL    MCH 30.0 26.5 - 33.0 pg    MCHC 33.7 31.5 - 36.5 g/dL    RDW 12.5 10.0 - 15.0 %    Platelet Count 459 (H) 150 - 450 10e3/uL    % Neutrophils 67 %    % Lymphocytes 21 %    % Monocytes 4 %    % Eosinophils 6 %    % Basophils 1 %    % Immature Granulocytes 1 %    NRBCs per 100 WBC 0 <1 /100    Absolute Neutrophils 5.3 1.6 - 8.3 10e3/uL    Absolute Lymphocytes 1.6 0.8 - 5.3 10e3/uL    Absolute Monocytes 0.3 0.0 - 1.3 10e3/uL    Absolute Eosinophils 0.4 0.0 - 0.7 10e3/uL    Absolute Basophils 0.0 0.0 - 0.2 10e3/uL    Absolute Immature Granulocytes 0.1 <=0.4 10e3/uL    Absolute NRBCs 0.0 10e3/uL   Result Value Ref Range    Uric Acid 6.6 (H) 2.4 - 5.7 mg/dL   Result Value Ref Range    Magnesium 2.2 1.7 - 2.3 mg/dL   Symptomatic Influenza A/B, RSV, & SARS-CoV2 PCR (COVID-19) Nasopharyngeal    Specimen: Nasopharyngeal; Swab   Result Value Ref Range    Influenza A PCR Negative Negative    Influenza B PCR Negative Negative    RSV PCR Negative Negative    SARS CoV2 PCR Negative Negative       I have reviewed the relevant laboratory studies above.    I independently interpreted the following imaging study(s):   No signs of acute kidney injury with a creatinine of 0.80.  Did note elevated lactate dehydrogenase and uric acid, will discuss patient case with OB/GYN.    EKG: I reviewed and independently interpreted the patient's  EKG, with comments made as listed below. Please see scanned EKG for full report.       Procedures:  I was present for the key portions of procedures documented in TEN/midlevel note, see midlevel note for further details.    CONNER PASCUAL MD  [facility] EMERGENCY ROOM  [address] [place] [id number]  [phone]     Conner Pascual MD  01/15/24 [id number]

## 2024-01-16 NOTE — TELEPHONE ENCOUNTER
Writer huddled with PCP regarding patient concerns.     PCP does not feel patient needs urgent evaluation at this time.    PCP made following recommendations:  Take Tylenol 1000 mg PO TID  Take Ibuprofen 800 mg TID  Drink a lot of water to keep self hydrated  Drink caffeine    Writer sent mychart to patient with these recommendations      DENIS Xie, RN  [facility]

## 2024-01-17 LAB — BACTERIA UR CULT: NORMAL

## 2024-01-21 ENCOUNTER — MYC MEDICAL ADVICE (OUTPATIENT)
Dept: FAMILY MEDICINE | Facility: CLINIC | Age: 34
End: 2024-01-21
Payer: COMMERCIAL

## 2024-01-22 NOTE — TELEPHONE ENCOUNTER
"See [employer] message into the clinic from the patient on 1/21/24:    \"Hi!      I just have a question about itching.. I am soooo itchy I could scratch my skin off. It's mainly my hands my and my chest. Is there a reason for this? And anything I can do to resolve this discomfort?\"    Writer sent the patient a [employer] message with directions on how to complete an e-visit for skin concerns for the provider to review and advise next steps.    Thelma Monroy RN, BSN  [facility]  "

## 2024-01-26 ENCOUNTER — NURSE TRIAGE (OUTPATIENT)
Dept: FAMILY MEDICINE | Facility: CLINIC | Age: 34
End: 2024-01-26
Payer: COMMERCIAL

## 2024-01-26 NOTE — TELEPHONE ENCOUNTER
huddled with Dr. Carbajal regarding the nurse triage from 1/26/24 as PCP is out of the office at this time.    Dr. Carbajal stated that the headache signs are concerning and that the patient should be seen evaluated tonight at ED or  due to increased headaches after giving birth on 1/8/24, as well as diagnoses of gestational HTN and pre-clampsia on 1/15/24.    Writer called and relayed the above information to the patient, who requested writer to see if there is an on-call OB/GYN provider that the patient could be referred to tonight.    Writer called the [facility] to try to be connected to an OB/GYN provider or to see if the hospital has a phone number for the patient to call after hours about her headache symptoms, but the the  at [facility], Shari, contacted the OB/GYN group at [facility] and they stated that the patient would have to have a primary doctor through OB/GYN and that Dr. Davidson is a family medicine provider.    It is recommended that the patient be seen in the ED if she does not have a PCP in OB/GYN.    Writer called the patient back and relayed the above information to the patient.    Provider Recommendation Follow Up:   Reached patient/caregiver. Informed of provider's recommendations. Patient verbalized understanding and partially agrees with the plan.          Patient stated she will monitor her BP tonight, as well as monitor her headache pain, and if either one start to climb, she will go directly to the ED.    Otherwise, the patient stated she will set up an appointment with the [facility] tomorrow and be evaluated by them tomorrow.    Writer reiterated the importance to be seen tonight in the ED for ANY worsening symptoms.    Patient verbalized understanding and agrees with the plan.    Denies other questions or concerns at this time.    Thelma Monroy RN, BSN  [facility]

## 2024-01-26 NOTE — TELEPHONE ENCOUNTER
"Nurse Triage SBAR    Is this a 2nd Level Triage? YES, LICENSED PRACTITIONER REVIEW IS REQUIRED    Situation:  See [employer] message form the patient into the clinic on 24:    \"I have had headaches again and today it was really bad all day from the time I woke up until I'm going to bed. It hurts to be in the light, watch tv, etc.      I haven't had headaches like this until after the birth of her.      I took my BP multiple times today and it wasn't elevated.      I took Tylenol and ibuprofen and neither even touched my headache.\"    Background:   Hx of birth of daughter via  at [facility] on 24    Also see ED visit notes from 1/15/24 at [facility] as patient was diagnosed with:  1. Gestational hypertension, antepartum    2. Mild pre-eclampsia, postpartum       Assessment:   Patient reports an ongoing headache since Wednesday, 24, evening    Was seen for an intense headache on 1/15/24 at [facility] ED and was given nifedipine for diagnoses of gestational HTN and pre-clampsia, which was effective    Pain is located behind right eye, but last night, pain was located behind left eye    Pain is constant, but goes between bad to worse-started originally a slight headache on Monday, 24, but has steadily gotten worse    Rated headache pain as 4 out of ten right now, but was 9 out of ten pain yesterday    Was told to monitor her BP due to headaches by the ED provider, but she states that she has been taking BP readings since she started having a headache and the highest BP reading has been 130/90    States she had migraines as a child, but has not had any headaches like this since becoming an adult, other than headache which caused her ED visit on 1/15/24    Has been having increased light sensitivity, as well as difficulty bending down or getting up from sitting without an increase in headache pain-Lightheaded somewhat yesterday, 24    Saw the OB/GYN provider who performed the " patient's  yesterday, who stated to her that she should stop the nifedipine she was taking because her BP has not been high enough to continue the medication    Has been using Tylenol and ibuprofen for pain, without much success-patient has been laying in bed all day today due to pain    Denies chest pain, trouble breathing, weakness or numbness on either side of the body, increased swelling of lower extremities, nausea, vomiting, diarrhea, dysuria, double vision or blurry vision, head injury, or dizziness or lightheadedness today     Protocol Recommended Disposition:   Callback By PCP Within 1 Hour    Recommendation:  Gave care advice. Recommended to the patient to go directly to the ED for worsening symptoms, but that the above information should be sent to the PCP to review per disposition.    Patient verbalized understanding and agrees with the plan.    Writer will send the above information to the PCP to review and advise next steps.    Routed to provider    Does the patient meet one of the following criteria for ADS visit consideration? 16+ years old, with an [facility] PCP     TIP  Providers, please consider if this condition is appropriate for management at one of our Acute and Diagnostic Services sites.     If patient is a good candidate, please use dotphrase <dot>triageresponse and select Refer to ADS to document.     Reason for Disposition   SEVERE headache (e.g., excruciating) and has had severe headaches before   SEVERE headache and not relieved by pain meds    Additional Information   Negative: Difficult to awaken or acting confused (e.g., disoriented, slurred speech)   Negative: Weakness of the face, arm or leg on one side of the body and new-onset   Negative: Numbness of the face, arm or leg on one side of the body and new-onset   Negative: Loss of speech or garbled speech and new-onset   Negative: Passed out (i.e., fainted, collapsed and was not responding)   Negative: Sounds like a  "life-threatening emergency to the triager   Negative: Followed a head injury within last 3 days   Negative: Traumatic Brain Injury (TBI) is suspected   Negative: Sinus pain of forehead and yellow or green nasal discharge   Negative: Pregnant   Negative: Unable to walk without falling   Negative: Stiff neck (can't touch chin to chest)   Negative: Possibility of carbon monoxide exposure   Negative: SEVERE headache, states 'worst headache' of life   Negative: SEVERE headache, sudden-onset (i.e., reaching maximum intensity within seconds to 1 hour)   Negative: Severe pain in one eye   Negative: Loss of vision or double vision  (Exception: Same as prior migraines.)   Negative: Patient sounds very sick or weak to the triager   Negative: Fever > 103 F (39.4 C)   Negative: Fever > 100.0 F (37.8 C) and has diabetes mellitus or a weak immune system (e.g., HIV positive, cancer chemotherapy, organ transplant, splenectomy, chronic steroids)    Answer Assessment - Initial Assessment Questions  1. LOCATION: \"Where does it hurt?\"         Pain is located behind right eye, last night behind left eye    2. ONSET: \"When did the headache start?\" (Minutes, hours or days)         1/24/24 Wednesday evening    3. PATTERN: \"Does the pain come and go, or has it been constant since it started?\"        Constant,but goes between bad to worse    4. SEVERITY: \"How bad is the pain?\" and \"What does it keep you from doing?\"  (e.g., Scale 1-10; mild, moderate, or severe)    - MILD (1-3): doesn't interfere with normal activities     - MODERATE (4-7): interferes with normal activities or awakens from sleep     - SEVERE (8-10): excruciating pain, unable to do any normal activities           Rated pain as 4 out of ten right now, but was 9 out of ten pain    5. RECURRENT SYMPTOM: \"Have you ever had headaches before?\" If Yes, ask: \"When was the last time?\" and \"What happened that time?\"         Migraines as a child    6. CAUSE: \"What do you think is causing " "the headache?\"        Unknown-possibly related to recent birth of daughter    7. MIGRAINE: \"Have you been diagnosed with migraine headaches?\" If Yes, ask: \"Is this headache similar?\"         Unsure-different type of headache than anything she has had as an adult    8. HEAD INJURY: \"Has there been any recent injury to the head?\"         No recent head injury    9. OTHER SYMPTOMS: \"Do you have any other symptoms?\" (fever, stiff neck, eye pain, sore throat, cold symptoms)        No fever, stiff neck, sore throat, cold symptoms    10. PREGNANCY: \"Is there any chance you are pregnant?\" \"When was your last menstrual period?\"          Gave birth to daughter on January 8th    Protocols used: Headache-A-OH    Thelma Monroy RN, BSN  [facility]    "

## 2024-02-12 PROBLEM — Z34.90 PREGNANCY: Status: RESOLVED | Noted: 2024-01-08 | Resolved: 2024-02-12

## 2024-02-12 PROBLEM — O34.219 PREVIOUS CESAREAN SECTION COMPLICATING PREGNANCY: Status: RESOLVED | Noted: 2024-01-07 | Resolved: 2024-02-12

## 2024-02-12 PROBLEM — O09.893 SUPERVISION OF OTHER HIGH RISK PREGNANCIES, THIRD TRIMESTER: Status: RESOLVED | Noted: 2024-01-07 | Resolved: 2024-02-12

## 2024-02-12 PROBLEM — O26.893 RH NEGATIVE STATE IN ANTEPARTUM PERIOD, THIRD TRIMESTER: Status: RESOLVED | Noted: 2024-01-02 | Resolved: 2024-02-12

## 2024-02-12 PROBLEM — Z67.91 RH NEGATIVE STATE IN ANTEPARTUM PERIOD, THIRD TRIMESTER: Status: RESOLVED | Noted: 2024-01-02 | Resolved: 2024-02-12

## 2024-02-12 NOTE — PROGRESS NOTES
Clinic Note - Postpartum Visit    Marisol Licea is a 33 year old  female presenting for routine postpartum follow up.    Date of delivery was  via repeat  at 38+3 weeks  Complications noted during the pregnancy include hx cholestasis, hx , hx  delivery, obesity, COVID infection during pregnancy, anxiety/depression.    Complications at the time of delivery include none - tubal also performed.      ER 1/15 - dx postpartum HTN - started on nifedipine, home BP cuff - off med now, BP normal today    Having joint stiffness lately, getting in and out of bed is really hard, back and legs  Back hurting - thinks somewhat from large breasts, does have implants in    The patient notes no worrisome bleeding since since the time of delivery - still a bit on active days.  She has no continued pain.  Patient is breast feeding.  Patient denies concerns with postpartum blues/depression.  Patient has had tubal ligation completed for contraception.     ROS:  General: No fevers.  Cardiac: No chest pain or palpitations.  Breasts: No redness, warmth, pain.   Pulmonary: No shortness of breath or respiratory distress.  GI: No abdominal pain.  Normal bowel habits, no incontinence.  : No dysuria, hematuria, no incontinence.  Extremities: No edema.    No Known Allergies    Current Outpatient Medications   Medication Sig Dispense Refill    acetaminophen (TYLENOL) 325 MG tablet Take 3 tablets (975 mg) by mouth every 6 hours      hydrOXYzine (ATARAX) 25 MG tablet Take 1-2 tablets (25-50 mg) by mouth nightly as needed (insomnia, sleep issues) 60 tablet 8    ibuprofen (ADVIL/MOTRIN) 800 MG tablet Take 1 tablet (800 mg) by mouth every 6 hours      Magnesium Oxide, Laxative, (LAX) 500 MG TABS tablet Take 400 mg by mouth daily      oxyCODONE (ROXICODONE) 5 MG tablet Take 1 tablet (5 mg) by mouth every 4 hours as needed for moderate to severe pain 12 tablet 0    Prenatal Vit-Fe Fumarate-FA (PRENATAL  "MULTIVITAMIN W/IRON) 27-0.8 MG tablet Take 1 tablet by mouth daily      senna-docusate (SENOKOT-S/PERICOLACE) 8.6-50 MG tablet Take 1 tablet by mouth 2 times daily as needed for constipation      sertraline (ZOLOFT) 25 MG tablet Take 1 tablet (25 mg) by mouth daily 90 tablet 3    valACYclovir (VALTREX) 1000 mg tablet Take 2 tablets (2,000 mg) by mouth 2 times daily for 1 day 4 tablet 3       Past Medical History:   Diagnosis Date    Encounter for induction of labor 2022    Failed induction of labor 2022    S/P  2022       OB History    Para Term  AB Living   2 2 1 1 0 2   SAB IAB Ectopic Multiple Live Births   0 0 0 0 2      # Outcome Date GA Lbr Joselito/2nd Weight Sex Delivery Anes PTL Lv   2 Term 24 38w3d  4.12 kg (9 lb 1.3 oz) F CS-LTranv   PEDRO      Name: Yanet Gibson  22 36w5d  3.75 kg (8 lb 4.3 oz) M CS-LTranv EPI  PEDRO      Complications: Failure to Progress in First Stage      Name: LAVERNE MONTERO-ILEANA      Apgar1: 9  Apgar5: 9       /70   Pulse 71   Temp 97.4  F (36.3  C) (Temporal)   Resp 18   Ht 1.651 m (5' 5\")   Wt 89.4 kg (197 lb 3.2 oz)   LMP  (LMP Unknown)   SpO2 98%   BMI 32.82 kg/m     General: no apparent distress, appears well  Pulmonary: breathing comfortably on room air  Abdomen: Soft, non-tender.  No rebound or guarding.   GYN: uterus is normal non-gravid size and nontender  Lower extremity: no significant swelling    Assessment/Plan  Routine postpartum follow-up  Overall doing well and adjusting to new baby. Post partum depression has not been a concern. Recommended continuing prenatal vitamin during breastfeeding. Ok to resume normal activities without restriction.     Joint stiffness  Back stiffness  Suspect some back stiffness/pain is related to large breast size as pt also is worried about; however given joint stiffness and severity of symptoms will check labs as below.   - Basic metabolic panel  (Ca, Cl, CO2, " Creat, Gluc, K, Na, BUN)  - CBC with platelets  - Vitamin D deficiency screening  - Magnesium  - Phosphorus  - Anti Nuclear Jane IgG by IFA with Reflex    Dee Davidson MD  [facility]

## 2024-02-13 ENCOUNTER — PRENATAL OFFICE VISIT (OUTPATIENT)
Dept: FAMILY MEDICINE | Facility: CLINIC | Age: 34
End: 2024-02-13
Payer: COMMERCIAL

## 2024-02-13 VITALS
TEMPERATURE: 97.4 F | HEIGHT: 65 IN | DIASTOLIC BLOOD PRESSURE: 70 MMHG | WEIGHT: 197.2 LBS | RESPIRATION RATE: 18 BRPM | HEART RATE: 71 BPM | BODY MASS INDEX: 32.86 KG/M2 | OXYGEN SATURATION: 98 % | SYSTOLIC BLOOD PRESSURE: 110 MMHG

## 2024-02-13 DIAGNOSIS — M25.69 BACK STIFFNESS: ICD-10-CM

## 2024-02-13 DIAGNOSIS — M25.60 JOINT STIFFNESS: ICD-10-CM

## 2024-02-13 LAB
ANION GAP SERPL CALCULATED.3IONS-SCNC: 13 MMOL/L (ref 7–15)
BUN SERPL-MCNC: 19.7 MG/DL (ref 6–20)
CALCIUM SERPL-MCNC: 9.8 MG/DL (ref 8.6–10)
CHLORIDE SERPL-SCNC: 103 MMOL/L (ref 98–107)
CREAT SERPL-MCNC: 0.88 MG/DL (ref 0.51–0.95)
DEPRECATED HCO3 PLAS-SCNC: 24 MMOL/L (ref 22–29)
EGFRCR SERPLBLD CKD-EPI 2021: 89 ML/MIN/1.73M2
ERYTHROCYTE [DISTWIDTH] IN BLOOD BY AUTOMATED COUNT: 11.8 % (ref 10–15)
GLUCOSE SERPL-MCNC: 96 MG/DL (ref 70–99)
HCT VFR BLD AUTO: 40.2 % (ref 35–47)
HGB BLD-MCNC: 13.6 G/DL (ref 11.7–15.7)
MAGNESIUM SERPL-MCNC: 2.1 MG/DL (ref 1.7–2.3)
MCH RBC QN AUTO: 29.8 PG (ref 26.5–33)
MCHC RBC AUTO-ENTMCNC: 33.8 G/DL (ref 31.5–36.5)
MCV RBC AUTO: 88 FL (ref 78–100)
PHOSPHATE SERPL-MCNC: 3.9 MG/DL (ref 2.5–4.5)
PLATELET # BLD AUTO: 285 10E3/UL (ref 150–450)
POTASSIUM SERPL-SCNC: 4.4 MMOL/L (ref 3.4–5.3)
RBC # BLD AUTO: 4.56 10E6/UL (ref 3.8–5.2)
SODIUM SERPL-SCNC: 140 MMOL/L (ref 135–145)
VIT D+METAB SERPL-MCNC: 37 NG/ML (ref 20–50)
WBC # BLD AUTO: 6.3 10E3/UL (ref 4–11)

## 2024-02-13 PROCEDURE — 36415 COLL VENOUS BLD VENIPUNCTURE: CPT | Performed by: FAMILY MEDICINE

## 2024-02-13 PROCEDURE — 85027 COMPLETE CBC AUTOMATED: CPT | Performed by: FAMILY MEDICINE

## 2024-02-13 PROCEDURE — 84100 ASSAY OF PHOSPHORUS: CPT | Performed by: FAMILY MEDICINE

## 2024-02-13 PROCEDURE — 80048 BASIC METABOLIC PNL TOTAL CA: CPT | Performed by: FAMILY MEDICINE

## 2024-02-13 PROCEDURE — 82306 VITAMIN D 25 HYDROXY: CPT | Performed by: FAMILY MEDICINE

## 2024-02-13 PROCEDURE — 86038 ANTINUCLEAR ANTIBODIES: CPT | Performed by: FAMILY MEDICINE

## 2024-02-13 PROCEDURE — 83735 ASSAY OF MAGNESIUM: CPT | Performed by: FAMILY MEDICINE

## 2024-02-13 PROCEDURE — 99213 OFFICE O/P EST LOW 20 MIN: CPT | Mod: 25 | Performed by: FAMILY MEDICINE

## 2024-02-13 ASSESSMENT — PAIN SCALES - GENERAL: PAINLEVEL: NO PAIN (0)

## 2024-02-14 LAB — ANA SER QL IF: NEGATIVE

## 2024-02-17 ENCOUNTER — MYC MEDICAL ADVICE (OUTPATIENT)
Dept: FAMILY MEDICINE | Facility: CLINIC | Age: 34
End: 2024-02-17
Payer: COMMERCIAL

## 2024-02-17 ENCOUNTER — NURSE TRIAGE (OUTPATIENT)
Dept: NURSING | Facility: CLINIC | Age: 34
End: 2024-02-17
Payer: COMMERCIAL

## 2024-02-17 NOTE — TELEPHONE ENCOUNTER
"About 5 weeks post op from C section 1/8/2024.Increased tenderness over last couple days so felt down there and see white \"stitch\" coming out of incision.  No drainage from site. Afebrile. Is tender to touch. Patient did send a picture of incision on line which this writer viewed.     Protocol reviewed, DISPOSITION: See PCP within 24 hours. Patient will go to [facility]. Call back with worsening symptoms, further questions/concerns.     OLIVIA GIORDANO RN  [facility] nurse advisors  2/17/2024  12:20 PM  Reason for Disposition   [1] INCREASING pain in incision AND [2] > 2 days (48 hours) since surgery    Additional Information   Negative: [1] Major abdominal surgical incision AND [2] wound gaping open AND [3] visible internal organs   Negative: Sounds like a life-threatening emergency to the triager   Negative: Patient has a Negative Pressure Wound Therapy device   Negative: Patient is followed by a wound clinic or wound specialist for this wound   Negative: [1] Bleeding from incision AND [2] won't stop after 10 minutes of direct pressure   Negative: [1] Bleeding (more than a few drops) from incision AND [2] tracheostomy or blood vessel surgery (e.g., carotid endarterectomy, femoral bypass graft, kidney dialysis fistula)   Negative: [1] Widespread rash AND [2] bright red, sunburn-like   Negative: Severe pain in the incision   Negative: [1] Incision gaping open AND [2] < 48 hours since wound re-opened   Negative: [1] Incision gaping open AND [2] length of opening > 2 inches (5 cm)   Negative: Patient sounds very sick or weak to the triager   Negative: Sounds like a serious complication to the triager   Negative: Fever > 100.4 F (38.0 C)   Negative: [1] Incision looks infected (spreading redness, pain) AND [2] fever > 99.5 F (37.5 C)   Negative: [1] Incision looks infected (spreading redness, pain) AND [2] large red area (> 2 in. or 5 cm)   Negative: [1] Incision looks infected (spreading redness, pain) AND [2] face " wound   Negative: [1] Red streak runs from the incision AND [2] longer than 1 inch (2.5 cm)   Negative: [1] Pus or bad-smelling fluid draining from incision AND [2] no fever   Negative: [1] Post-op pain AND [2] not controlled with pain medications   Negative: Dressing soaked with blood or body fluid (e.g., drainage)   Negative: [1] Scant bleeding (e.g., few drops) from incision AND AND [2] tracheostomy or blood vessel surgery (e.g., carotid endarterectomy, femoral bypass graft, kidney dialysis fistula)   Negative: [1] Raised bruise and [2] size > 2 inches (5 cm) and expanding   Negative: [1] Caller has URGENT question AND [2] triager unable to answer question    Protocols used: Post-Op Incision Symptoms and [id number]-OS

## 2024-02-19 NOTE — TELEPHONE ENCOUNTER
See nurse triage with patient from 2/17/24, Satirday, as well as ongoing MyChart messaging between the patient and the [facility] team on the 2/17/24 MyChart encounter.    Patient is scheduled to be evaluated in-clinic today, 2/19/24, Monday, at the [facility] location, at 3:30 pm, with the surgical nurse at [facility]-see appointment scheduling below:      Tamera Parra RN  to Marisol Licea   2/19/24 11:22 AM    Yes, that would be fine.   I will put you on the schedule for 3:30 pm today.      [address] [facility] [address]  [facility]     Last read by Marisol Licea at 11:23 AM on 2/19/2024.    Writer will close this encounter.    Thelma Monroy RN, BSN  [facility]

## 2024-02-23 ENCOUNTER — E-VISIT (OUTPATIENT)
Dept: FAMILY MEDICINE | Facility: CLINIC | Age: 34
End: 2024-02-23
Payer: COMMERCIAL

## 2024-02-23 DIAGNOSIS — N89.8 VAGINAL DISCHARGE: Primary | ICD-10-CM

## 2024-02-23 PROCEDURE — 99421 OL DIG E/M SVC 5-10 MIN: CPT | Performed by: FAMILY MEDICINE

## 2024-02-25 RX ORDER — FLUCONAZOLE 150 MG/1
TABLET ORAL
Qty: 2 TABLET | Refills: 0 | Status: SHIPPED | OUTPATIENT
Start: 2024-02-25

## 2024-02-26 NOTE — TELEPHONE ENCOUNTER
Dr Davidson,  Please see MyChart response and advise.    TONY CornellN, RN  [facility]

## 2024-02-27 ENCOUNTER — OFFICE VISIT (OUTPATIENT)
Dept: FAMILY MEDICINE | Facility: CLINIC | Age: 34
End: 2024-02-27
Payer: COMMERCIAL

## 2024-02-27 VITALS
OXYGEN SATURATION: 96 % | HEART RATE: 67 BPM | RESPIRATION RATE: 16 BRPM | TEMPERATURE: 98 F | SYSTOLIC BLOOD PRESSURE: 108 MMHG | DIASTOLIC BLOOD PRESSURE: 65 MMHG

## 2024-02-27 DIAGNOSIS — J02.9 SORE THROAT: ICD-10-CM

## 2024-02-27 DIAGNOSIS — J02.9 EXUDATIVE PHARYNGITIS: Primary | ICD-10-CM

## 2024-02-27 LAB
DEPRECATED S PYO AG THROAT QL EIA: NEGATIVE
GROUP A STREP BY PCR: NOT DETECTED

## 2024-02-27 PROCEDURE — 87651 STREP A DNA AMP PROBE: CPT | Performed by: PHYSICIAN ASSISTANT

## 2024-02-27 PROCEDURE — 99213 OFFICE O/P EST LOW 20 MIN: CPT | Performed by: PHYSICIAN ASSISTANT

## 2024-02-27 RX ORDER — PENICILLIN V POTASSIUM 500 MG/1
500 TABLET, FILM COATED ORAL 2 TIMES DAILY
Qty: 20 TABLET | Refills: 0 | Status: SHIPPED | OUTPATIENT
Start: 2024-02-27 | End: 2024-03-08

## 2024-02-27 NOTE — PATIENT INSTRUCTIONS
Take the antibiotic as written  Noncontagious after 24 hours on the antibiotic  Use of over-the-counter Cepacol (benzocaine) throat lozenges for comfort.  Follow packaging directions  Increased rest increase fluids    Follow up with primary care provider if you do not get resolution with the course of treatment.  Return to walk-in care if complication or new symptoms arise in the interim.

## 2024-02-27 NOTE — PROGRESS NOTES
Patient presents with:  Throat Problem: Has sore throat started yesterday       Clinical Decision Making:  Strep test was obtained and was negative.  Culture is to follow.  COVID-19 screening test was not done at home.  Patient is treated empirically with Pen-Vee K for the exudative tonsillitis.  Patient did have strep throat exposure with a nephew.  Patient is currently breast-feeding and we reviewed the risks and benefits of antibiotic treatment with breast-feeding.  Reviewed pump and dump with formula as an option. Symptomatic care was gone over. Expected course of resolution and indication for return was gone over and questions were answered to patient/parent's satisfaction before discharge.        ICD-10-CM    1. Exudative pharyngitis  J02.9 penicillin V (VEETID) 500 MG tablet      2. Sore throat  J02.9 Streptococcus A Rapid Screen w/Reflex to PCR - Clinic Collect     Group A Streptococcus PCR Throat Swab      3. Breast feeding status of mother  Z39.1           Patient Instructions   Take the antibiotic as written  Noncontagious after 24 hours on the antibiotic  Use of over-the-counter Cepacol (benzocaine) throat lozenges for comfort.  Follow packaging directions  Increased rest increase fluids    Follow up with primary care provider if you do not get resolution with the course of treatment.  Return to walk-in care if complication or new symptoms arise in the interim.         HPI:  Marisol Licea is a 33 year old female who is 7 weeks postpartum and is currently breast-feeding who presents today one day acute onset of sore throat and odynophagia.  Patient denies fever, chills, night sweats, fatigue, vomiting, diarrhea, skin rash, abdominal pain or urinary symptoms.  No reported respiratory symptoms.  No COVID testing was performed at home.  Patient shares that she has been removing quite a bit of tonsil stones and exudate from the crypts of the tonsils.    Known sick contacts for strep throat with  nephew.    Has not tried treatment for this over-the-counter.    History obtained from chart review and the patient.    Problem List:  2024: Pregnancy  2024: Supervision of other high risk pregnancies, third trimester  2024: Previous  section complicating pregnancy  2024: Rh negative state in antepartum period, third trimester  2023: Cervical high risk HPV (human papillomavirus) test positive  2023: History of cholestasis during pregnancy  2022: Encounter for induction of labor  2022: Failed induction of labor  2022: S/P   2022: Cholestasis during pregnancy (H28)  2022: PCOS (polycystic ovarian syndrome)  2022: Hemorrhoids, unspecified hemorrhoid type  2022: Supervision of normal first pregnancy, antepartum  2009: ACL tear      Past Medical History:   Diagnosis Date    Encounter for induction of labor 2022    Failed induction of labor 2022    S/P  2022       Social History     Tobacco Use    Smoking status: Former     Packs/day: .25     Types: Cigarettes     Quit date: 2022     Years since quittin.9     Passive exposure: Never    Smokeless tobacco: Never   Substance Use Topics    Alcohol use: Not Currently       Review of Systems  As above in HPI otherwise negative.    Vitals:    24 1017   BP: 108/65   Pulse: 67   Resp: 16   Temp: 98  F (36.7  C)   TempSrc: Oral   SpO2: 96%       General: Patient is resting comfortably no acute distress is afebrile  HEENT: Head is normocephalic atraumatic   eyes are PERRL EOMI sclera anicteric   TMs are clear bilaterally  Throat is with mild pharyngeal wall erythema and right tonsil has more exudate than the left.  Uvula is midline soft palate is not deformed  No cervical lymphadenopathy present  LUNGS: Clear to auscultation bilaterally  HEART: Regular rate and rhythm  Skin: Without rash non-diaphoretic    Physical Exam      Labs:  Results for orders placed or performed in visit on  02/27/24   Streptococcus A Rapid Screen w/Reflex to PCR - Clinic Collect     Status: Normal    Specimen: Throat; Swab   Result Value Ref Range    Group A Strep antigen Negative Negative     At the end of the encounter, I discussed results, diagnosis, medications. Discussed red flags for immediate return to clinic/ER, as well as indications for follow up if no improvement. Patient understood and agreed to plan. Patient was stable for discharge.

## 2024-03-09 ENCOUNTER — HEALTH MAINTENANCE LETTER (OUTPATIENT)
Age: 34
End: 2024-03-09

## 2024-04-12 ENCOUNTER — MYC REFILL (OUTPATIENT)
Dept: FAMILY MEDICINE | Facility: CLINIC | Age: 34
End: 2024-04-12
Payer: COMMERCIAL

## 2024-04-12 DIAGNOSIS — F41.1 GENERALIZED ANXIETY DISORDER: ICD-10-CM

## 2024-04-12 DIAGNOSIS — F32.A DEPRESSION AFFECTING PREGNANCY: ICD-10-CM

## 2024-04-12 DIAGNOSIS — O99.340 DEPRESSION AFFECTING PREGNANCY: ICD-10-CM

## 2024-04-15 RX ORDER — SERTRALINE HYDROCHLORIDE 25 MG/1
25 TABLET, FILM COATED ORAL DAILY
Qty: 90 TABLET | Refills: 3 | Status: SHIPPED | OUTPATIENT
Start: 2024-04-15 | End: 2024-07-22

## 2024-05-23 ENCOUNTER — TRANSFERRED RECORDS (OUTPATIENT)
Dept: HEALTH INFORMATION MANAGEMENT | Facility: CLINIC | Age: 34
End: 2024-05-23
Payer: COMMERCIAL

## 2024-06-03 ENCOUNTER — TRANSFERRED RECORDS (OUTPATIENT)
Dept: HEALTH INFORMATION MANAGEMENT | Facility: CLINIC | Age: 34
End: 2024-06-03
Payer: COMMERCIAL

## 2024-06-06 ENCOUNTER — PATIENT OUTREACH (OUTPATIENT)
Dept: FAMILY MEDICINE | Facility: CLINIC | Age: 34
End: 2024-06-06
Payer: COMMERCIAL

## 2024-06-06 PROBLEM — R87.810 CERVICAL HIGH RISK HPV (HUMAN PAPILLOMAVIRUS) TEST POSITIVE: Status: ACTIVE | Noted: 2023-06-20

## 2024-06-25 ENCOUNTER — TRANSFERRED RECORDS (OUTPATIENT)
Dept: HEALTH INFORMATION MANAGEMENT | Facility: CLINIC | Age: 34
End: 2024-06-25
Payer: COMMERCIAL

## 2024-07-22 ENCOUNTER — MYC MEDICAL ADVICE (OUTPATIENT)
Dept: FAMILY MEDICINE | Facility: CLINIC | Age: 34
End: 2024-07-22
Payer: COMMERCIAL

## 2024-07-22 DIAGNOSIS — O99.340 DEPRESSION AFFECTING PREGNANCY: ICD-10-CM

## 2024-07-22 DIAGNOSIS — F32.A DEPRESSION AFFECTING PREGNANCY: ICD-10-CM

## 2024-07-22 DIAGNOSIS — F41.1 GENERALIZED ANXIETY DISORDER: ICD-10-CM

## 2024-07-29 NOTE — TELEPHONE ENCOUNTER
FYI to provider - Patient is lost to pap tracking follow-up. Attempts to contact pt have been made per reminder process and there has been no reply and/or no appt scheduled.       2014 NIL pap (last pap in chart)  6/20/23 NIL pap, +HR HPV, not 16/18, pregnant. Plan 1 yr co-test / notified by phone  06/6/24 Reminder Mychart  06/27/24 Reminder call-lm  7/29/24 Lost to follow up      Ashlie Dillon RN, BSN

## 2024-09-16 ENCOUNTER — OFFICE VISIT (OUTPATIENT)
Dept: FAMILY MEDICINE | Facility: CLINIC | Age: 34
End: 2024-09-16
Payer: COMMERCIAL

## 2024-09-16 VITALS
OXYGEN SATURATION: 97 % | DIASTOLIC BLOOD PRESSURE: 82 MMHG | HEART RATE: 77 BPM | TEMPERATURE: 97.9 F | SYSTOLIC BLOOD PRESSURE: 122 MMHG | RESPIRATION RATE: 16 BRPM

## 2024-09-16 DIAGNOSIS — R07.0 THROAT PAIN: ICD-10-CM

## 2024-09-16 DIAGNOSIS — J03.90 TONSILLITIS: Primary | ICD-10-CM

## 2024-09-16 LAB
BASOPHILS # BLD AUTO: 0 10E3/UL (ref 0–0.2)
BASOPHILS NFR BLD AUTO: 0 %
DEPRECATED S PYO AG THROAT QL EIA: NEGATIVE
EOSINOPHIL # BLD AUTO: 0.1 10E3/UL (ref 0–0.7)
EOSINOPHIL NFR BLD AUTO: 1 %
ERYTHROCYTE [DISTWIDTH] IN BLOOD BY AUTOMATED COUNT: 11.6 % (ref 10–15)
FLUAV AG SPEC QL IA: NEGATIVE
FLUBV AG SPEC QL IA: NEGATIVE
GROUP A STREP BY PCR: NOT DETECTED
HCT VFR BLD AUTO: 36.2 % (ref 35–47)
HGB BLD-MCNC: 12.1 G/DL (ref 11.7–15.7)
IMM GRANULOCYTES # BLD: 0 10E3/UL
IMM GRANULOCYTES NFR BLD: 0 %
LYMPHOCYTES # BLD AUTO: 1.3 10E3/UL (ref 0.8–5.3)
LYMPHOCYTES NFR BLD AUTO: 24 %
MCH RBC QN AUTO: 30.6 PG (ref 26.5–33)
MCHC RBC AUTO-ENTMCNC: 33.4 G/DL (ref 31.5–36.5)
MCV RBC AUTO: 92 FL (ref 78–100)
MONOCYTES # BLD AUTO: 0.4 10E3/UL (ref 0–1.3)
MONOCYTES NFR BLD AUTO: 8 %
MONOCYTES NFR BLD AUTO: NEGATIVE %
NEUTROPHILS # BLD AUTO: 3.7 10E3/UL (ref 1.6–8.3)
NEUTROPHILS NFR BLD AUTO: 67 %
PLATELET # BLD AUTO: 226 10E3/UL (ref 150–450)
RBC # BLD AUTO: 3.95 10E6/UL (ref 3.8–5.2)
WBC # BLD AUTO: 5.5 10E3/UL (ref 4–11)

## 2024-09-16 PROCEDURE — 99214 OFFICE O/P EST MOD 30 MIN: CPT | Performed by: STUDENT IN AN ORGANIZED HEALTH CARE EDUCATION/TRAINING PROGRAM

## 2024-09-16 PROCEDURE — 87804 INFLUENZA ASSAY W/OPTIC: CPT | Performed by: STUDENT IN AN ORGANIZED HEALTH CARE EDUCATION/TRAINING PROGRAM

## 2024-09-16 PROCEDURE — 36415 COLL VENOUS BLD VENIPUNCTURE: CPT | Performed by: STUDENT IN AN ORGANIZED HEALTH CARE EDUCATION/TRAINING PROGRAM

## 2024-09-16 PROCEDURE — 85025 COMPLETE CBC W/AUTO DIFF WBC: CPT | Performed by: STUDENT IN AN ORGANIZED HEALTH CARE EDUCATION/TRAINING PROGRAM

## 2024-09-16 PROCEDURE — 87651 STREP A DNA AMP PROBE: CPT | Performed by: STUDENT IN AN ORGANIZED HEALTH CARE EDUCATION/TRAINING PROGRAM

## 2024-09-16 PROCEDURE — 86308 HETEROPHILE ANTIBODY SCREEN: CPT | Performed by: STUDENT IN AN ORGANIZED HEALTH CARE EDUCATION/TRAINING PROGRAM

## 2024-09-16 RX ORDER — LIDOCAINE HYDROCHLORIDE 20 MG/ML
15 SOLUTION OROPHARYNGEAL EVERY 4 HOURS PRN
Qty: 200 ML | Refills: 0 | Status: SHIPPED | OUTPATIENT
Start: 2024-09-16

## 2024-09-16 NOTE — PROGRESS NOTES
ASSESSMENT & PLAN:   Diagnoses and all orders for this visit:  Tonsillitis  -     lidocaine, viscous, (XYLOCAINE) 2 % solution; Swish and spit 15 mLs in mouth every 4 hours as needed for pain. ; Max 8 doses/24 hour period.  Throat pain  -     Streptococcus A Rapid Screen w/Reflex to PCR - Clinic Collect  -     Group A Streptococcus PCR Throat Swab  -     CBC with platelets and differential; Future  -     Mononucleosis screen; Future  -     Influenza A & B Antigen - Clinic Collect    Sore throat x 3 days. On exam tonsils 1+ with exudate bilaterally. No evidence of PTA. RST negative, PCR pending. Influenza test negative. Mono screen negative - possibly too early for true results. CBC normal. Likely viral. Symptomatic treatment discussed including rest, fluids, Tylenol/ibuprofen, viscous lidocaine as needed.    At the end of the encounter, I discussed results, diagnosis, medications. Discussed red flags for immediate return to clinic/ER, as well as indications for follow up if no improvement. Patient and/or caregiver understood and agreed to plan. Patient was stable for discharge.    Patient Instructions   Rapid strep test negative. Culture is pending -we will call you if this is positive.  For your sore throat, you may try salt water gargles, tea with honey, throat lozenges/spray (Cepacol), using a humidifier.  Take tylenol and/or ibuprofen as needed for pain/fever.  Stay well-hydrated, get plenty of rest, and wash your hands often.   Follow-up with your PCP in 7-10 days if symptoms persist, sooner if symptoms worsen.      No follow-ups on file.    ------------------------------------------------------------------------  SUBJECTIVE  History was obtained from patient.    Patient presents with:  Pharyngitis: Sore throat and white spots, did take some old penicillin sat sun, will pick a pharmacy if needed     HPI  Marisol Licea is a(n) 33 year old female presenting to urgent care for sore throat x 3 days.  Developed white spots the next day. Reports fatigue and body aches. No cough, congestion, rhinorrhea, fever, nausea, vomiting, diarrhea. Took leftover penicillin for the past few days. Has 2 kids at home - sick with rhinorrhea and pinkeye symptoms.     Review of Systems    Current Outpatient Medications   Medication Sig Dispense Refill    lidocaine, viscous, (XYLOCAINE) 2 % solution Swish and spit 15 mLs in mouth every 4 hours as needed for pain. ; Max 8 doses/24 hour period. 200 mL 0    sertraline (ZOLOFT) 50 MG tablet Take 1 tablet (50 mg) by mouth daily 90 tablet 3    acetaminophen (TYLENOL) 325 MG tablet Take 3 tablets (975 mg) by mouth every 6 hours (Patient not taking: Reported on 2024)      fluconazole (DIFLUCAN) 150 MG tablet Take 1 tablet now; take second tablet 5-7 days later if symptoms persist. (Patient not taking: Reported on 2024) 2 tablet 0    hydrOXYzine (ATARAX) 25 MG tablet Take 1-2 tablets (25-50 mg) by mouth nightly as needed (insomnia, sleep issues) (Patient not taking: Reported on 2024) 60 tablet 8    ibuprofen (ADVIL/MOTRIN) 800 MG tablet Take 1 tablet (800 mg) by mouth every 6 hours (Patient not taking: Reported on 2024)      Magnesium Oxide, Laxative, (LAX) 500 MG TABS tablet Take 400 mg by mouth daily (Patient not taking: Reported on 2024)      Prenatal Vit-Fe Fumarate-FA (PRENATAL MULTIVITAMIN W/IRON) 27-0.8 MG tablet Take 1 tablet by mouth daily (Patient not taking: Reported on 2024)      senna-docusate (SENOKOT-S/PERICOLACE) 8.6-50 MG tablet Take 1 tablet by mouth 2 times daily as needed for constipation (Patient not taking: Reported on 2024)      valACYclovir (VALTREX) 1000 mg tablet Take 2 tablets (2,000 mg) by mouth 2 times daily for 1 day 4 tablet 3     Problem List:  2024: Pregnancy  2024: Supervision of other high risk pregnancies, third trimester  2024: Previous  section complicating pregnancy  2024: Rh negative state in  antepartum period, third trimester  2023: Cervical high risk HPV (human papillomavirus) test positive  2023: History of cholestasis during pregnancy  2022: Encounter for induction of labor  2022: Failed induction of labor  2022: S/P   2022: Cholestasis during pregnancy  2022: PCOS (polycystic ovarian syndrome)  2022: Hemorrhoids, unspecified hemorrhoid type  2022: Supervision of normal first pregnancy, antepartum  2009: ACL tear    No Known Allergies      OBJECTIVE  Vitals:    24 1731   BP: 122/82   Pulse: 77   Resp: 16   Temp: 97.9  F (36.6  C)   TempSrc: Oral   SpO2: 97%     Physical Exam   GENERAL: healthy, alert, no acute distress.   PSYCH: mentation appears normal. Normal affect  HEAD: normocephalic, atraumatic.  EYE: PERRL. EOMs intact. No scleral injection bilaterally.   EAR: external ear normal. Bilateral ear canals normal and nonpainful. Bilateral TM intact, pearly, translucent without bulging.  NOSE: external nose atraumatic without lesions.  OROPHARYNX: moist mucous membranes. Tonsils 1+, mild erythema, exudate bilaterally. Uvula midline. Patent airway.  LUNGS: no increased work of breathing. Clear lung sounds bilaterally. No wheezing, rhonchi, or rales.   CV: regular rate and rhythm. No clicks, murmurs, or rubs.    Results for orders placed or performed in visit on 24   Mononucleosis screen     Status: Normal   Result Value Ref Range    Mononucleosis Screen Negative Negative   CBC with platelets and differential     Status: None   Result Value Ref Range    WBC Count 5.5 4.0 - 11.0 10e3/uL    RBC Count 3.95 3.80 - 5.20 10e6/uL    Hemoglobin 12.1 11.7 - 15.7 g/dL    Hematocrit 36.2 35.0 - 47.0 %    MCV 92 78 - 100 fL    MCH 30.6 26.5 - 33.0 pg    MCHC 33.4 31.5 - 36.5 g/dL    RDW 11.6 10.0 - 15.0 %    Platelet Count 226 150 - 450 10e3/uL    % Neutrophils 67 %    % Lymphocytes 24 %    % Monocytes 8 %    % Eosinophils 1 %    % Basophils 0 %    % Immature  Granulocytes 0 %    Absolute Neutrophils 3.7 1.6 - 8.3 10e3/uL    Absolute Lymphocytes 1.3 0.8 - 5.3 10e3/uL    Absolute Monocytes 0.4 0.0 - 1.3 10e3/uL    Absolute Eosinophils 0.1 0.0 - 0.7 10e3/uL    Absolute Basophils 0.0 0.0 - 0.2 10e3/uL    Absolute Immature Granulocytes 0.0 <=0.4 10e3/uL   Streptococcus A Rapid Screen w/Reflex to PCR - Clinic Collect     Status: Normal    Specimen: Throat; Swab   Result Value Ref Range    Group A Strep antigen Negative Negative   Influenza A & B Antigen - Clinic Collect     Status: Normal    Specimen: Nose; Swab   Result Value Ref Range    Influenza A antigen Negative Negative    Influenza B antigen Negative Negative    Narrative    Test results must be correlated with clinical data. If necessary, results should be confirmed by a molecular assay or viral culture.   CBC with platelets and differential     Status: None    Narrative    The following orders were created for panel order CBC with platelets and differential.  Procedure                               Abnormality         Status                     ---------                               -----------         ------                     CBC with platelets and d...[[id number]]                      Final result                 Please view results for these tests on the individual orders.

## 2024-09-16 NOTE — LETTER
September 16, 2024      Marisol Licea  [address] [place] [id number]        To Whom It May Concern:    Marisol Licea  was seen on 9/16/24. She should either work from home or be off work while she has symptoms as she is still contagious. She may return to work when her symptoms have improved.        Sincerely,        Rashida Cormier PA-C

## 2024-09-18 ENCOUNTER — MYC MEDICAL ADVICE (OUTPATIENT)
Dept: FAMILY MEDICINE | Facility: CLINIC | Age: 34
End: 2024-09-18
Payer: COMMERCIAL

## 2024-11-14 ENCOUNTER — MYC MEDICAL ADVICE (OUTPATIENT)
Dept: FAMILY MEDICINE | Facility: CLINIC | Age: 34
End: 2024-11-14
Payer: COMMERCIAL

## 2025-01-07 NOTE — PATIENT INSTRUCTIONS
Phone Numbers:  [facility]: [phone]  [facility]: [phone]    When to call or come in to the hospital   If you have any bleeding or leakage of fluids.   If you have uterine cramping that does not resolve with rest and fluids.  If you have a headache not resolved with tylenol, right upper abdominal pain, or sudden onset of swelling.  You know your body best. Never hesitate to call or go to the hospital if something doesn't feel right!    Genetic Screening  Sampling of your blood to screen for genetic abnormalities, like Down's syndrome, in your baby  Screening test only - further testing, like advanced ultrasound or amniocentesis, would be needed to confirm positive test  Recommended for mothers over age 35, history of genetic abnormalities - but offered to all pregnant women.  Talk to your doctor if you have further questions, or are interested in this screening test.     Breastfeeding  Breast feeding is best, for you and baby!   Recommendations are for exclusive breastfeeding for babies first 6 months of life.  Talk to your doctor if you have more questions about breastfeeding your baby.    Other Pregnancy Concerns  Continue to take a prenatal vitamin daily  Maintain an active, healthy lifestyle.   If this is your first baby, you can expect to start feeling movement at 20-24 weeks gestation. If this is your second or more pregnancy, you will generally start feeling movement at 18-22 weeks gestation.   [place] parenting classes [email or web address]  [place] parenting classes [email or web address]  Free online classes through [employer]    Pre-Endoscopy History and Physical   Lev Olvera MRN# [id number]   YOB: 1970 Age: 54 year old   Date of Procedure: 1/7/2025   Primary care provider: Deejay De Los Santos   Type of Endoscopy: colonoscopy   Reason for Procedure: screening   Type of Anesthesia Anticipated: Moderate Sedation   HPI:   Lev is a 54 year old male for screening colonoscopy.  He has never had a colonoscopy before.  He denies BRBPR, abdominal pain, nausea/vomiting, changes in bowel habits or unintentional weight loss.  He denies a FH of CRC.    Allergies   Allergen Reactions    Penicillins Hives    Trazodone Hcl      dizzy      Prior to Admission Medications   Prescriptions Last Dose Informant Patient Reported? Taking?   amLODIPine (NORVASC) 5 MG tablet 1/6/2025  No Yes   Sig: TAKE 1 TABLET BY MOUTH EVERY DAY   aspirin (ASA) 81 MG chewable tablet 1/6/2025  No Yes   Sig: Take 1 tablet (81 mg) by mouth daily   atorvastatin (LIPITOR) 80 MG tablet 1/6/2025  No Yes   Sig: TAKE 1 TABLET BY MOUTH EVERY EVENING   lisinopril (ZESTRIL) 20 MG tablet 1/6/2025  No Yes   Sig: TAKE 1 TABLET BY MOUTH EVERY DAY      Facility-Administered Medications: None      Patient Active Problem List   Diagnosis    Benign essential hypertension    KATHERINE (obstructive sleep apnea)    Nasal obstruction    Nasal valve collapse    Deviated nasal septum    Status post rhinoplasty    Vertebral artery dissection (H)    Posterior circulation stroke (H)      Past Medical History:   Diagnosis Date    Cerebral infarction (H)     Hypertension     Sleep apnea       Past Surgical History:   Procedure Laterality Date    SEPTOPLASTY, TURBINOPLASTY, COMBINED N/A 11/26/2019    Procedure: Septorhinoplasty with repair of nasal valve collaspe, bilateral turbinate reduction,;  Surgeon: Alberta Driscoll MD;  Location:  OR    SURGICAL HISTORY OF -       Tooth extraction      Social History     Tobacco Use    Smoking status: Never     Passive exposure: Never    Smokeless  "tobacco: Never   Substance Use Topics    Alcohol use: Yes     Alcohol/week: 0.0 standard drinks of alcohol     Comment: occasional      Family History   Problem Relation Age of Onset    Family History Negative Mother         alive age 60  Hx Ulcers    Hypertension Father     Coronary Artery Disease Father     Hypertension Brother         alive age 35    Family History Negative Sister         alive age 38    Glaucoma No family hx of     Macular Degeneration No family hx of       PHYSICAL EXAM:   /86   Pulse 58   Resp 16   Ht 1.905 m (6' 3\")   Wt 105.2 kg (232 lb)   SpO2 98%   BMI 29.00 kg/m   Estimated body mass index is 29 kg/m  as calculated from the following:    Height as of this encounter: 1.905 m (6' 3\").    Weight as of this encounter: 105.2 kg (232 lb).   RESP: lungs clear to auscultation - no rales, rhonchi or wheezes   CV: regular rates and rhythm   ASA Class 2 - Mild systemic disease    Assessment: 55 y/o gentleman for screening colonoscopy    Plan: Colonoscopy with moderate sedation.  Risks of the procedure were discussed including, but not limited to, bleeding, perforation and missed lesions.  Patient understands and is willing to proceed.    Kevin Hill MD ....................  1/7/2025   8:07 AM  Colon and Rectal Surgery Staff  [phone]     "

## 2025-03-04 ENCOUNTER — MYC REFILL (OUTPATIENT)
Dept: FAMILY MEDICINE | Facility: CLINIC | Age: 35
End: 2025-03-04
Payer: COMMERCIAL

## 2025-03-04 DIAGNOSIS — F32.A DEPRESSION AFFECTING PREGNANCY: ICD-10-CM

## 2025-03-04 DIAGNOSIS — F41.1 GENERALIZED ANXIETY DISORDER: ICD-10-CM

## 2025-03-04 DIAGNOSIS — O99.340 DEPRESSION AFFECTING PREGNANCY: ICD-10-CM

## 2025-03-05 RX ORDER — HYDROXYZINE HYDROCHLORIDE 25 MG/1
25-50 TABLET, FILM COATED ORAL
Qty: 60 TABLET | Refills: 3 | Status: SHIPPED | OUTPATIENT
Start: 2025-03-05

## 2025-03-16 ENCOUNTER — HEALTH MAINTENANCE LETTER (OUTPATIENT)
Age: 35
End: 2025-03-16

## 2025-06-27 ENCOUNTER — MYC REFILL (OUTPATIENT)
Dept: FAMILY MEDICINE | Facility: CLINIC | Age: 35
End: 2025-06-27
Payer: COMMERCIAL

## 2025-06-27 DIAGNOSIS — F41.1 GENERALIZED ANXIETY DISORDER: ICD-10-CM

## 2025-06-27 DIAGNOSIS — O99.340 DEPRESSION AFFECTING PREGNANCY: ICD-10-CM

## 2025-06-27 DIAGNOSIS — F32.A DEPRESSION AFFECTING PREGNANCY: ICD-10-CM

## 2025-06-30 ENCOUNTER — MYC MEDICAL ADVICE (OUTPATIENT)
Dept: FAMILY MEDICINE | Facility: CLINIC | Age: 35
End: 2025-06-30
Payer: COMMERCIAL

## 2025-06-30 RX ORDER — HYDROXYZINE HYDROCHLORIDE 25 MG/1
25-50 TABLET, FILM COATED ORAL
Qty: 60 TABLET | Refills: 3 | Status: SHIPPED | OUTPATIENT
Start: 2025-06-30

## 2025-06-30 RX ORDER — SERTRALINE HYDROCHLORIDE 100 MG/1
100 TABLET, FILM COATED ORAL DAILY
Qty: 90 TABLET | Refills: 1 | Status: SHIPPED | OUTPATIENT
Start: 2025-06-30

## 2025-06-30 NOTE — TELEPHONE ENCOUNTER
See MyChart message, into the clinic, from the patient, on 6/30/25.    Writer called the patient and left a message to return call.    When the patient calls back, please route her to the RN queue for possible nurse triage.    Thelma Monroy, RN, BSN  [facility]

## 2025-06-30 NOTE — TELEPHONE ENCOUNTER
S-(situation):   Patient returning call to clinic.     B-(background):   No new stressors in life - current relationship is not great but this is not new   Zoloft - Currently on 100 mg once daily - has been on this dose for at least a year per patient   Does have her mom who is a resource for support for patient     A-(assessment):   Feeling off - feeling more down, wants to be alone/secluded  Denies thoughts of self harm or suicide   Started to noticed being more down a few days ago     R-(recommendations):   Patient would prefer to have a visit with PCP to discuss concerns due to concern for confidentiality due to possible custody singh.   Completed a verbal contract of safety with patient who is agreeable to call clinic or mom if concerns or thoughts of suicide or self harm   Assisted with scheduling appointment with PCP on 7/10/25.    Sally Mckee, TONYN, RN  [facility]

## 2025-07-07 RX ORDER — SERTRALINE HYDROCHLORIDE 100 MG/1
150 TABLET, FILM COATED ORAL DAILY
Status: CANCELLED
Start: 2025-07-07

## 2025-07-07 NOTE — PROGRESS NOTES
Assessment/Plan:    Generalized anxiety disorder  Current moderate episode of major depressive disorder without prior episode (H)  Uncontrolled anxiety/depression, suspect stress contributing. Discussed increasing sertraline to 150mg daily which pt hasn't started yet. She reports having significant anxiety with long car drives - will trial propranolol for this (sort of as a performance anxiety type of scenario).   - propranolol (INDERAL) 10 MG tablet  Dispense: 30 tablet; Refill: 1    Hemorrhoids, unspecified hemorrhoid type  Pt continues to have issues in this area, referral to colorectal for further evaluation and management.   - Adult Colorectal Surgery  Referral    Cervical high risk HPV (human papillomavirus) test positive  Due for pap smear, cotesting completed today.  - HPV and Gynecologic Cytology Panel - Recommended Age 30-65 Years    Migraine without aura and without status migrainosus, not intractable  Pt with worsening migraines, suspect stress contributing, ok to continue occasional tylenol/excedrin, will trial imitrex as below.   - SUMAtriptan (IMITREX) 50 MG tablet  Dispense: 30 tablet; Refill: 3       Follow up: as needed    Dee Davidson MD  [facility]    Subjective:   Marisol Licea is a 34 year old female is here today for multiple concerns    -having hemorrhoid issues again - had surgery previously, not good experience, constant pain/issues, internal and external   -due for pap  -issues with anxiety with long car drives - lots of stress currently  -migraines - at least 1x/week, sometimes 2x/week - takes tylenol or excedrin migraine, sometimes helps but not always    Answers submitted by the patient for this visit:  Patient Health Questionnaire (Submitted on 7/10/2025)  If you checked off any problems, how difficult have these problems made it for you to do your work, take care of things at home, or get along with other people?: Somewhat difficult  PHQ9 TOTAL SCORE:  13  Patient Health Questionnaire (G7) (Submitted on 7/10/2025)  SANDOR 7 TOTAL SCORE: 11  Migraine Visit Questionnaire (Submitted on 7/10/2025)  Chief Complaint: Chronic problems general questions HPI Form  Headache Symptoms are: worsened  How often are you getting headaches or migraines? : [id number] a wk  Are you able to do normal daily activities when you have a migraine?: Yes  Migraine Rescue/Relief Medications:: Tylenol, Excedrin  Effectiveness of rescue/relief medications:: The relief is inconsistent  Migraine Preventative Medications:: no medications to prevent migraines  ER or UC Visits:: 0 times  Depression / Anxiety Questionnaire (Submitted on 7/10/2025)  Chief Complaint: Chronic problems general questions HPI Form  Depression/Anxiety: Depression & Anxiety  Depression & Anxiety (Submitted on 7/10/2025)  Chief Complaint: Chronic problems general questions HPI Form  Status since last visit:: worse  Anxiety since last: : worse  Other associated symptoms of depression:: No  Other associated symotome: : No  Significant life event: : relationship concerns, financial concerns, housing concerns  Anxious:: Yes  Current substance use:: No  General Questionnaire (Submitted on 7/10/2025)  Chief Complaint: Chronic problems general questions HPI Form  What is the reason for your visit today? : hemmroids,headaches,anxiety depression  How many servings of fruits and vegetables do you eat daily?: 0-1  On average, how many sweetened beverages do you drink each day (Examples: soda, juice, sweet tea, etc.  Do NOT count diet or artificially sweetened beverages)?: 1  How many minutes a day do you exercise enough to make your heart beat faster?: 20 to 29  How many days a week do you exercise enough to make your heart beat faster?: 3 or less  How many days per week do you miss taking your medication?: 0  Questionnaire about: Chronic problems general questions HPI Form (Submitted on 7/10/2025)  Chief Complaint: Chronic problems  general questions HPI Form    Patient Active Problem List   Diagnosis    ACL tear    PCOS (polycystic ovarian syndrome)    Hemorrhoids, unspecified hemorrhoid type    S/P     History of cholestasis during pregnancy    Cervical high risk HPV (human papillomavirus) test positive     Past Medical History:   Diagnosis Date    Encounter for induction of labor 2022    Failed induction of labor 2022    S/P  2022     Past Surgical History:   Procedure Laterality Date     SECTION N/A 2022    Procedure:  SECTION;  Surgeon: Essence Perkins MD;  [facility]: [facility]     SECTION, TUBAL LIGATION, COMBINED Bilateral 2024    Procedure: REPEAT  SECTION AND BILATERAL TUBAL LIGATION;  Surgeon: Essence Perkins MD;  Location: [facility] OR    HEMORRHOIDECTOMY      LAPAROSCOPIC HERNIORRHAPHY UMBILICAL N/A 2023    Procedure: HERNIORRHAPHY, UMBILICAL, LAPAROSCOPIC;  Surgeon: Sahil Méndez MD;  Location: [facility] OR     Current Outpatient Medications   Medication Sig Dispense Refill    hydrOXYzine HCl (ATARAX) 25 MG tablet Take 1-2 tablets (25-50 mg) by mouth nightly as needed (insomnia, sleep issues). 60 tablet 3    propranolol (INDERAL) 10 MG tablet Take 1 tablet 30-60 minutes before long car drive. Ok to take another dose if needed. 30 tablet 1    SUMAtriptan (IMITREX) 50 MG tablet Take 1 tablet (50 mg) by mouth at onset of headache for migraine. May repeat in 2 hours. Max 4 tablets/24 hours. 30 tablet 3    valACYclovir (VALTREX) 1000 mg tablet Take 2 tablets (2,000 mg) by mouth 2 times daily for 1 day 4 tablet 3    sertraline (ZOLOFT) 100 MG tablet Take 1 tablet (100 mg) by mouth daily. 90 tablet 1     No current facility-administered medications for this visit.     No Known Allergies  Social History     Socioeconomic History    Marital status: Single     Spouse name: Not on file    Number of children: Not on  file    Years of education: Not on file    Highest education level: Not on file   Occupational History    Not on file   Tobacco Use    Smoking status: Former     Current packs/day: 0.00     Types: Cigarettes     Quit date: 2/28/2022     Years since quitting: 3.3     Passive exposure: Never    Smokeless tobacco: Never   Vaping Use    Vaping status: Never Used   Substance and Sexual Activity    Alcohol use: Not Currently    Drug use: Not Currently    Sexual activity: Yes     Partners: Male   Other Topics Concern    Not on file   Social History Narrative    Not on file     Social Drivers of Health     Financial Resource Strain: Low Risk  (12/19/2023)    Financial Resource Strain     Within the past 12 months, have you or your family members you live with been unable to get utilities (heat, electricity) when it was really needed?: No   Food Insecurity: Low Risk  (12/19/2023)    Food Insecurity     Within the past 12 months, did you worry that your food would run out before you got money to buy more?: No     Within the past 12 months, did the food you bought just not last and you didn t have money to get more?: No   Transportation Needs: Low Risk  (12/19/2023)    Transportation Needs     Within the past 12 months, has lack of transportation kept you from medical appointments, getting your medicines, non-medical meetings or appointments, work, or from getting things that you need?: No   Physical Activity: Not on file   Stress: Not on file   Social Connections: Not on file   Interpersonal Safety: Low Risk  (7/10/2025)    Interpersonal Safety     Do you feel physically and emotionally safe where you currently live?: Yes     Within the past 12 months, have you been hit, slapped, kicked or otherwise physically hurt by someone?: No     Within the past 12 months, have you been humiliated or emotionally abused in other ways by your partner or ex-partner?: No   Housing Stability: Low Risk  (12/19/2023)    Housing Stability     Do  "you have housing? : Yes     Are you worried about losing your housing?: No     Family History   Problem Relation Age of Onset    No Known Problems Mother     No Known Problems Father     No Known Problems Sister     No Known Problems Brother     Chronic Obstructive Pulmonary Disease Maternal Grandmother     Chronic Obstructive Pulmonary Disease Maternal Grandfather     Other - See Comments Maternal Grandfather         heart condition    Cancer Paternal Grandmother     Myocardial Infarction Paternal Grandfather      Review of systems is as stated in HPI, and the remainder of system review is otherwise negative.    Objective:     /74   Pulse 80   Temp 98.1  F (36.7  C) (Temporal)   Resp 16   Ht 1.651 m (5' 5\")   Wt 81.2 kg (179 lb)   LMP  (LMP Unknown)   SpO2 100%   Breastfeeding No   BMI 29.79 kg/m      General appearance: awake, NAD  HEENT: atraumatic, normocephalic, no scleral icterus or injection  Lungs: breathing comfortably on room air  : normal external genitalia, normal appearing cervix  Neuro: alert, oriented x3, CNs grossly intact, no focal deficits appreciated  Psych: normal mood/affect/behavior, answering questions appropriately, linear thought process    "

## 2025-07-10 ENCOUNTER — OFFICE VISIT (OUTPATIENT)
Dept: FAMILY MEDICINE | Facility: CLINIC | Age: 35
End: 2025-07-10
Payer: COMMERCIAL

## 2025-07-10 VITALS
TEMPERATURE: 98.1 F | SYSTOLIC BLOOD PRESSURE: 121 MMHG | HEART RATE: 80 BPM | WEIGHT: 179 LBS | RESPIRATION RATE: 16 BRPM | DIASTOLIC BLOOD PRESSURE: 74 MMHG | HEIGHT: 65 IN | BODY MASS INDEX: 29.82 KG/M2 | OXYGEN SATURATION: 100 %

## 2025-07-10 DIAGNOSIS — F32.1 CURRENT MODERATE EPISODE OF MAJOR DEPRESSIVE DISORDER WITHOUT PRIOR EPISODE (H): ICD-10-CM

## 2025-07-10 DIAGNOSIS — R87.810 CERVICAL HIGH RISK HPV (HUMAN PAPILLOMAVIRUS) TEST POSITIVE: ICD-10-CM

## 2025-07-10 DIAGNOSIS — K64.9 HEMORRHOIDS, UNSPECIFIED HEMORRHOID TYPE: ICD-10-CM

## 2025-07-10 DIAGNOSIS — F41.1 GENERALIZED ANXIETY DISORDER: Primary | ICD-10-CM

## 2025-07-10 DIAGNOSIS — G43.009 MIGRAINE WITHOUT AURA AND WITHOUT STATUS MIGRAINOSUS, NOT INTRACTABLE: ICD-10-CM

## 2025-07-10 RX ORDER — SUMATRIPTAN 50 MG/1
50 TABLET, FILM COATED ORAL
Qty: 30 TABLET | Refills: 3 | Status: SHIPPED | OUTPATIENT
Start: 2025-07-10

## 2025-07-10 RX ORDER — PROPRANOLOL HYDROCHLORIDE 10 MG/1
TABLET ORAL
Qty: 30 TABLET | Refills: 1 | Status: SHIPPED | OUTPATIENT
Start: 2025-07-10

## 2025-07-10 ASSESSMENT — ANXIETY QUESTIONNAIRES
6. BECOMING EASILY ANNOYED OR IRRITABLE: MORE THAN HALF THE DAYS
8. IF YOU CHECKED OFF ANY PROBLEMS, HOW DIFFICULT HAVE THESE MADE IT FOR YOU TO DO YOUR WORK, TAKE CARE OF THINGS AT HOME, OR GET ALONG WITH OTHER PEOPLE?: SOMEWHAT DIFFICULT
GAD7 TOTAL SCORE: 11
7. FEELING AFRAID AS IF SOMETHING AWFUL MIGHT HAPPEN: SEVERAL DAYS
IF YOU CHECKED OFF ANY PROBLEMS ON THIS QUESTIONNAIRE, HOW DIFFICULT HAVE THESE PROBLEMS MADE IT FOR YOU TO DO YOUR WORK, TAKE CARE OF THINGS AT HOME, OR GET ALONG WITH OTHER PEOPLE: SOMEWHAT DIFFICULT
3. WORRYING TOO MUCH ABOUT DIFFERENT THINGS: SEVERAL DAYS
5. BEING SO RESTLESS THAT IT IS HARD TO SIT STILL: NEARLY EVERY DAY
GAD7 TOTAL SCORE: 11
7. FEELING AFRAID AS IF SOMETHING AWFUL MIGHT HAPPEN: SEVERAL DAYS
GAD7 TOTAL SCORE: 11
2. NOT BEING ABLE TO STOP OR CONTROL WORRYING: SEVERAL DAYS
4. TROUBLE RELAXING: MORE THAN HALF THE DAYS
1. FEELING NERVOUS, ANXIOUS, OR ON EDGE: SEVERAL DAYS

## 2025-07-10 ASSESSMENT — PATIENT HEALTH QUESTIONNAIRE - PHQ9
SUM OF ALL RESPONSES TO PHQ QUESTIONS 1-9: 13
SUM OF ALL RESPONSES TO PHQ QUESTIONS 1-9: 13
10. IF YOU CHECKED OFF ANY PROBLEMS, HOW DIFFICULT HAVE THESE PROBLEMS MADE IT FOR YOU TO DO YOUR WORK, TAKE CARE OF THINGS AT HOME, OR GET ALONG WITH OTHER PEOPLE: SOMEWHAT DIFFICULT

## 2025-07-14 ENCOUNTER — PATIENT OUTREACH (OUTPATIENT)
Dept: CARE COORDINATION | Facility: CLINIC | Age: 35
End: 2025-07-14
Payer: COMMERCIAL

## 2025-07-16 ENCOUNTER — PATIENT OUTREACH (OUTPATIENT)
Dept: FAMILY MEDICINE | Facility: CLINIC | Age: 35
End: 2025-07-16
Payer: COMMERCIAL

## 2025-07-16 LAB
BKR AP ASSOCIATED HPV REPORT: NORMAL
BKR LAB AP GYN ADEQUACY: NORMAL
BKR LAB AP GYN INTERPRETATION: NORMAL
BKR LAB AP PREVIOUS ABNL DX: NORMAL
BKR LAB AP PREVIOUS ABNORMAL: NORMAL
PATH REPORT.COMMENTS IMP SPEC: NORMAL
PATH REPORT.COMMENTS IMP SPEC: NORMAL
PATH REPORT.RELEVANT HX SPEC: NORMAL

## 2025-07-21 ENCOUNTER — MYC MEDICAL ADVICE (OUTPATIENT)
Dept: FAMILY MEDICINE | Facility: CLINIC | Age: 35
End: 2025-07-21
Payer: COMMERCIAL

## 2025-07-21 DIAGNOSIS — R53.83 FATIGUE, UNSPECIFIED TYPE: Primary | ICD-10-CM

## 2025-07-21 DIAGNOSIS — R51.9 ACUTE INTRACTABLE HEADACHE, UNSPECIFIED HEADACHE TYPE: ICD-10-CM

## 2025-07-31 ENCOUNTER — LAB (OUTPATIENT)
Dept: LAB | Facility: CLINIC | Age: 35
End: 2025-07-31
Payer: COMMERCIAL

## 2025-07-31 DIAGNOSIS — R51.9 ACUTE INTRACTABLE HEADACHE, UNSPECIFIED HEADACHE TYPE: ICD-10-CM

## 2025-07-31 DIAGNOSIS — R53.83 FATIGUE, UNSPECIFIED TYPE: ICD-10-CM

## 2025-07-31 LAB
ALBUMIN SERPL BCG-MCNC: 4.4 G/DL (ref 3.5–5.2)
ALP SERPL-CCNC: 31 U/L (ref 40–150)
ALT SERPL W P-5'-P-CCNC: 10 U/L (ref 0–50)
ANION GAP SERPL CALCULATED.3IONS-SCNC: 9 MMOL/L (ref 7–15)
AST SERPL W P-5'-P-CCNC: 15 U/L (ref 0–45)
BILIRUB SERPL-MCNC: 0.2 MG/DL
BUN SERPL-MCNC: 11.3 MG/DL (ref 6–20)
CALCIUM SERPL-MCNC: 9.6 MG/DL (ref 8.8–10.4)
CHLORIDE SERPL-SCNC: 106 MMOL/L (ref 98–107)
CREAT SERPL-MCNC: 0.98 MG/DL (ref 0.51–0.95)
EGFRCR SERPLBLD CKD-EPI 2021: 78 ML/MIN/1.73M2
ERYTHROCYTE [DISTWIDTH] IN BLOOD BY AUTOMATED COUNT: 11.6 % (ref 10–15)
FERRITIN SERPL-MCNC: 27 NG/ML (ref 6–175)
FOLATE SERPL-MCNC: 4.4 NG/ML (ref 4.6–34.8)
GLUCOSE SERPL-MCNC: 91 MG/DL (ref 70–99)
HCO3 SERPL-SCNC: 24 MMOL/L (ref 22–29)
HCT VFR BLD AUTO: 36 % (ref 35–47)
HGB BLD-MCNC: 12.6 G/DL (ref 11.7–15.7)
IRON BINDING CAPACITY (ROCHE): 329 UG/DL (ref 240–430)
IRON SATN MFR SERPL: 25 % (ref 15–46)
IRON SERPL-MCNC: 81 UG/DL (ref 37–145)
MAGNESIUM SERPL-MCNC: 1.9 MG/DL (ref 1.7–2.3)
MCH RBC QN AUTO: 30.3 PG (ref 26.5–33)
MCHC RBC AUTO-ENTMCNC: 35 G/DL (ref 31.5–36.5)
MCV RBC AUTO: 87 FL (ref 78–100)
PHOSPHATE SERPL-MCNC: 4.6 MG/DL (ref 2.5–4.5)
PLATELET # BLD AUTO: 291 10E3/UL (ref 150–450)
POTASSIUM SERPL-SCNC: 4.3 MMOL/L (ref 3.4–5.3)
PROT SERPL-MCNC: 6.8 G/DL (ref 6.4–8.3)
RBC # BLD AUTO: 4.16 10E6/UL (ref 3.8–5.2)
SODIUM SERPL-SCNC: 139 MMOL/L (ref 135–145)
TSH SERPL DL<=0.005 MIU/L-ACNC: 1.71 UIU/ML (ref 0.3–4.2)
VIT B12 SERPL-MCNC: 825 PG/ML (ref 232–1245)
VIT D+METAB SERPL-MCNC: 26 NG/ML (ref 20–50)
WBC # BLD AUTO: 4.4 10E3/UL (ref 4–11)

## 2025-09-01 ENCOUNTER — MYC REFILL (OUTPATIENT)
Dept: FAMILY MEDICINE | Facility: CLINIC | Age: 35
End: 2025-09-01
Payer: COMMERCIAL

## 2025-09-01 DIAGNOSIS — F32.A DEPRESSION AFFECTING PREGNANCY: ICD-10-CM

## 2025-09-01 DIAGNOSIS — F41.1 GENERALIZED ANXIETY DISORDER: ICD-10-CM

## 2025-09-01 DIAGNOSIS — O99.340 DEPRESSION AFFECTING PREGNANCY: ICD-10-CM

## 2025-09-02 RX ORDER — SERTRALINE HYDROCHLORIDE 100 MG/1
100 TABLET, FILM COATED ORAL DAILY
Qty: 90 TABLET | Refills: 1 | OUTPATIENT
Start: 2025-09-02

## 2025-09-03 RX ORDER — PROPRANOLOL HYDROCHLORIDE 10 MG/1
TABLET ORAL
Qty: 30 TABLET | Refills: 1 | Status: SHIPPED | OUTPATIENT
Start: 2025-09-03

## (undated) DEVICE — Device

## (undated) DEVICE — GLOVE BIOGEL PI ULTRATOUCH G SZ 6.5 42165

## (undated) DEVICE — BLADE CLIPPER DISP 4406

## (undated) DEVICE — PACK MINOR SINGLE BASIN SSK3001

## (undated) DEVICE — SURGICEL POWDER ABSORBABLE HEMOSTAT 3GM 3013SP

## (undated) DEVICE — GLOVE BIOGEL PI ULTRATOUCH G SZ 8.0 42180

## (undated) DEVICE — SUTURE VICRYL+ 0 27IN CT-1 UND VCP260H

## (undated) DEVICE — ENDO SHEARS RENEW LAP ENDOCUT SCISSOR TIP 16.5MM 3142

## (undated) DEVICE — DEVICE FIXATION SECURESTRAP TACKER 5MM ABSORB STRAP25

## (undated) DEVICE — SU MONOCRYL+ 4-0 18IN PS2 UND MCP496G

## (undated) DEVICE — SUTURE MONOCRYL+ 0 CT-1 UND 18 MCP946H

## (undated) DEVICE — SU WND CLOSURE VLOC 90 3-0 15CML VLOCM0604

## (undated) DEVICE — ESU GROUND PAD ADULT REM W/15' CORD E7507DB

## (undated) DEVICE — SUCTION MANIFOLD NEPTUNE 2 SYS 1 PORT 702-025-000

## (undated) DEVICE — DECANTER VIAL 2006S

## (undated) DEVICE — ENDO TROCAR SLEEVE KII Z-THREADED 05X100MM CTS02

## (undated) DEVICE — BLADE KNIFE SURG 11 371111

## (undated) DEVICE — SUTURE PDS 0 27IN CT1 + VIOLET PDP340H

## (undated) DEVICE — DRSG TEGADERM 2 3/8X2 3/4" 1624W

## (undated) DEVICE — CUSTOM PACK C-SECTION LHE

## (undated) DEVICE — SOL WATER IRRIG 1000ML BOTTLE 2F7114

## (undated) DEVICE — GOWN IMPERVIOUS BREATHABLE SMART LG 89015

## (undated) DEVICE — SOL NACL 0.9% IRRIG 1000ML BOTTLE 2F7124

## (undated) DEVICE — GOWN XXL 9575

## (undated) DEVICE — DRSG STERI STRIP 1/2X4" R1547

## (undated) DEVICE — PREP CHLORAPREP 26ML TINTED HI-LITE ORANGE 930815

## (undated) DEVICE — PLATE GROUNDING ADULT W/CORD 9165L

## (undated) DEVICE — GLOVE UNDER INDICATOR PI SZ 6.5 LF 41665

## (undated) DEVICE — SU VICRYL+ 0 27 UR6 VLT VCP603H

## (undated) DEVICE — SUCTION MANIFOLD NEPTUNE 2 SYS 4 PORT 0702-020-000

## (undated) DEVICE — SUTURE VICRYL+ 0 36IN CT-1 UND VCP946H

## (undated) DEVICE — DRESSING MEPILEX ADH 4INX10IN STRL LF 496455

## (undated) DEVICE — SYSTEM LAPAROVUE VISIBILITY LAPVUE10

## (undated) DEVICE — DRSG TELFA 3X4" 1050

## (undated) DEVICE — ENDO TROCAR SHIELDED BLADED KII Z-THRD 11X100MM CTB33

## (undated) DEVICE — TUBING SMOKE EVAC PNEUMOCLEAR HIGH FLOW 0620050250

## (undated) DEVICE — SU STRATAFIX PDS PLUS CT-1 15CM SXPP1A420

## (undated) DEVICE — CUSTOM PACK LAP CHOLE SBA5BLCHEA

## (undated) DEVICE — UNDERPAD 30X30 BULK 949B10

## (undated) DEVICE — ENDO TROCAR OPTICAL ACCESS KII Z-THRD 05X100MM CTR03

## (undated) DEVICE — PAD SANITARY ADHES BACK 11IN NS 1380A

## (undated) DEVICE — SU PLAIN 0 TIE 54" S104H

## (undated) RX ORDER — BUPIVACAINE HYDROCHLORIDE 2.5 MG/ML
INJECTION, SOLUTION INFILTRATION; PERINEURAL
Status: DISPENSED
Start: 2023-03-09

## (undated) RX ORDER — MORPHINE SULFATE 1 MG/ML
INJECTION, SOLUTION EPIDURAL; INTRATHECAL; INTRAVENOUS
Status: DISPENSED
Start: 2024-01-08

## (undated) RX ORDER — LIDOCAINE HYDROCHLORIDE 10 MG/ML
INJECTION, SOLUTION EPIDURAL; INFILTRATION; INTRACAUDAL; PERINEURAL
Status: DISPENSED
Start: 2023-03-09

## (undated) RX ORDER — CEFAZOLIN SODIUM 1 G/3ML
INJECTION, POWDER, FOR SOLUTION INTRAMUSCULAR; INTRAVENOUS
Status: DISPENSED
Start: 2024-01-08

## (undated) RX ORDER — GLYCOPYRROLATE 0.2 MG/ML
INJECTION, SOLUTION INTRAMUSCULAR; INTRAVENOUS
Status: DISPENSED
Start: 2024-01-08

## (undated) RX ORDER — ESMOLOL HYDROCHLORIDE 10 MG/ML
INJECTION INTRAVENOUS
Status: DISPENSED
Start: 2023-03-09

## (undated) RX ORDER — FENTANYL CITRATE 50 UG/ML
INJECTION, SOLUTION INTRAMUSCULAR; INTRAVENOUS
Status: DISPENSED
Start: 2023-03-09

## (undated) RX ORDER — PROPOFOL 10 MG/ML
INJECTION, EMULSION INTRAVENOUS
Status: DISPENSED
Start: 2023-03-09

## (undated) RX ORDER — GLYCOPYRROLATE 0.2 MG/ML
INJECTION, SOLUTION INTRAMUSCULAR; INTRAVENOUS
Status: DISPENSED
Start: 2023-03-09

## (undated) RX ORDER — KETOROLAC TROMETHAMINE 30 MG/ML
INJECTION, SOLUTION INTRAMUSCULAR; INTRAVENOUS
Status: DISPENSED
Start: 2023-03-09

## (undated) RX ORDER — LIDOCAINE HYDROCHLORIDE AND EPINEPHRINE 10; 10 MG/ML; UG/ML
INJECTION, SOLUTION INFILTRATION; PERINEURAL
Status: DISPENSED
Start: 2023-03-09

## (undated) RX ORDER — METOPROLOL TARTRATE 1 MG/ML
INJECTION, SOLUTION INTRAVENOUS
Status: DISPENSED
Start: 2023-03-09

## (undated) RX ORDER — KETAMINE HYDROCHLORIDE 10 MG/ML
INJECTION INTRAMUSCULAR; INTRAVENOUS
Status: DISPENSED
Start: 2023-03-09